# Patient Record
Sex: FEMALE | Race: BLACK OR AFRICAN AMERICAN | NOT HISPANIC OR LATINO | Employment: OTHER | URBAN - METROPOLITAN AREA
[De-identification: names, ages, dates, MRNs, and addresses within clinical notes are randomized per-mention and may not be internally consistent; named-entity substitution may affect disease eponyms.]

---

## 2022-03-14 ENCOUNTER — OFFICE VISIT (OUTPATIENT)
Dept: CARDIOLOGY | Facility: CLINIC | Age: 47
End: 2022-03-14
Payer: MEDICARE

## 2022-03-14 VITALS
SYSTOLIC BLOOD PRESSURE: 117 MMHG | RESPIRATION RATE: 20 BRPM | HEART RATE: 61 BPM | WEIGHT: 263.75 LBS | DIASTOLIC BLOOD PRESSURE: 75 MMHG | HEIGHT: 72 IN | BODY MASS INDEX: 35.72 KG/M2

## 2022-03-14 DIAGNOSIS — I87.2 VENOUS INSUFFICIENCY: ICD-10-CM

## 2022-03-14 DIAGNOSIS — I10 ESSENTIAL (PRIMARY) HYPERTENSION: ICD-10-CM

## 2022-03-14 DIAGNOSIS — I25.10 CORONARY ARTERY DISEASE, UNSPECIFIED VESSEL OR LESION TYPE, UNSPECIFIED WHETHER ANGINA PRESENT, UNSPECIFIED WHETHER NATIVE OR TRANSPLANTED HEART: Primary | ICD-10-CM

## 2022-03-14 DIAGNOSIS — Q87.418 MARFAN'S SYNDROME WITH OTHER CARDIOVASCULAR MANIFESTATIONS: ICD-10-CM

## 2022-03-14 PROCEDURE — 99205 PR OFFICE/OUTPT VISIT, NEW, LEVL V, 60-74 MIN: ICD-10-PCS | Mod: S$PBB,,, | Performed by: INTERNAL MEDICINE

## 2022-03-14 PROCEDURE — 99999 PR PBB SHADOW E&M-NEW PATIENT-LVL III: ICD-10-PCS | Mod: PBBFAC,,, | Performed by: INTERNAL MEDICINE

## 2022-03-14 PROCEDURE — 93005 ELECTROCARDIOGRAM TRACING: CPT | Mod: PBBFAC,PO | Performed by: INTERNAL MEDICINE

## 2022-03-14 PROCEDURE — 99999 PR PBB SHADOW E&M-NEW PATIENT-LVL III: CPT | Mod: PBBFAC,,, | Performed by: INTERNAL MEDICINE

## 2022-03-14 PROCEDURE — 93010 EKG 12-LEAD: ICD-10-PCS | Mod: S$PBB,,, | Performed by: INTERNAL MEDICINE

## 2022-03-14 PROCEDURE — 99203 OFFICE O/P NEW LOW 30 MIN: CPT | Mod: PBBFAC,PO | Performed by: INTERNAL MEDICINE

## 2022-03-14 PROCEDURE — 93010 ELECTROCARDIOGRAM REPORT: CPT | Mod: S$PBB,,, | Performed by: INTERNAL MEDICINE

## 2022-03-14 PROCEDURE — 99205 OFFICE O/P NEW HI 60 MIN: CPT | Mod: S$PBB,,, | Performed by: INTERNAL MEDICINE

## 2022-03-14 RX ORDER — BUMETANIDE 2 MG/1
2 TABLET ORAL
COMMUNITY
End: 2022-03-14 | Stop reason: SDUPTHER

## 2022-03-14 RX ORDER — POTASSIUM CHLORIDE 1.5 G/1.58G
40 POWDER, FOR SOLUTION ORAL
COMMUNITY
End: 2022-03-14 | Stop reason: SDUPTHER

## 2022-03-14 RX ORDER — POTASSIUM CHLORIDE 1.5 G/1.58G
40 POWDER, FOR SOLUTION ORAL DAILY
Qty: 90 PACKET | Refills: 3 | Status: SHIPPED | OUTPATIENT
Start: 2022-03-14 | End: 2022-09-09 | Stop reason: SDUPTHER

## 2022-03-14 RX ORDER — BUMETANIDE 1 MG/1
1 TABLET ORAL DAILY
Qty: 90 TABLET | Refills: 3 | Status: SHIPPED | OUTPATIENT
Start: 2022-03-14 | End: 2022-09-09 | Stop reason: SDUPTHER

## 2022-03-14 RX ORDER — CYANOCOBALAMIN 1000 UG/ML
1000 INJECTION, SOLUTION INTRAMUSCULAR; SUBCUTANEOUS
COMMUNITY
End: 2022-09-09 | Stop reason: SDUPTHER

## 2022-03-14 RX ORDER — DOXEPIN HYDROCHLORIDE 150 MG/1
150 CAPSULE ORAL
COMMUNITY
End: 2022-09-09 | Stop reason: ALTCHOICE

## 2022-03-14 RX ORDER — TIZANIDINE 4 MG/1
8 TABLET ORAL
COMMUNITY
End: 2022-09-09 | Stop reason: SDUPTHER

## 2022-03-14 RX ORDER — FERROUS SULFATE 325(65) MG
325 TABLET ORAL
COMMUNITY

## 2022-03-14 RX ORDER — NITROGLYCERIN 0.4 MG/1
0.4 TABLET SUBLINGUAL
COMMUNITY
End: 2022-03-14

## 2022-03-14 RX ORDER — GABAPENTIN 600 MG/1
600 TABLET ORAL
COMMUNITY
End: 2022-09-09 | Stop reason: SDUPTHER

## 2022-03-14 RX ORDER — SPIRONOLACTONE 50 MG/1
25 TABLET, FILM COATED ORAL
COMMUNITY
End: 2022-03-14 | Stop reason: SDUPTHER

## 2022-03-14 RX ORDER — METOLAZONE 10 MG/1
10 TABLET ORAL
COMMUNITY
End: 2022-03-14

## 2022-03-14 RX ORDER — CALCIUM CARBONATE 600 MG
600 TABLET ORAL
COMMUNITY

## 2022-03-14 RX ORDER — PROMETHAZINE HYDROCHLORIDE 25 MG/1
25 SUPPOSITORY RECTAL DAILY PRN
COMMUNITY
End: 2023-04-14

## 2022-03-14 RX ORDER — ISOSORBIDE DINITRATE 5 MG/1
5 TABLET ORAL
COMMUNITY
End: 2022-03-14

## 2022-03-14 RX ORDER — SPIRONOLACTONE 25 MG/1
25 TABLET ORAL DAILY
Qty: 90 TABLET | Refills: 3 | Status: SHIPPED | OUTPATIENT
Start: 2022-03-14 | End: 2023-03-09 | Stop reason: SDUPTHER

## 2022-03-14 NOTE — ASSESSMENT & PLAN NOTE
Patient carries diagnosis of Marfan's after SCAD event in '08. Patient unclear on details. No e/o Thoracic/abdominal aneurysm or dissection. Will request OSH cards notes to make sure there have been no other Marfan's related events. Sounds like she had a genetic evaluation in [place] that was unremarkable.

## 2022-03-14 NOTE — ASSESSMENT & PLAN NOTE
Blood pressures controlled on raymond 25x1/bumex 2x1. Sounds like they run low. Could not tolerate betablocker previously. Have reduced bumex.

## 2022-03-14 NOTE — ASSESSMENT & PLAN NOTE
Patient with a h/o SCAD '08 s/p CABGx1 w SVG-RCA. Will request OSH cardiologists notes. Multiple cath reports reviewed. RF Modification. Check lipid panel. Bps controlled.

## 2022-03-14 NOTE — PROGRESS NOTES
Chief Complaint   Patient presents with    Heart Problem       HPI: 48 yo F with a h/o Marfan's/SCAD '08 s/p CABGx1 to RCA, hypertension, L CIV/EIV stenting for May-Thurner '11 gastric bypass, RUL lung resection, left chest port '16 presenting for initial evaluation by me.     The patient recently moved from [place] to [place] and is establishing care here. The patient's initial cardiac event was in '08 when she presented w an AMI 2/2 SCAD. This resulted in a CABGx1. Pt was curiously asymptomatic at that time of event. Pt unclear on the details, but reports that she was getting a physical exam and due to a test abnormality was rushed to the emergency room, where she ended up undergoing cardiac catheterization and emergent bypass? This is where her diagnosis of Marfan's comes from. Negative genetic testing in [place].    Since then, no repeat operations. She's underwent multiple cardiac catheterizations and CT angiograms that have been unremarkable for a chronic intermittent chest pain syndrome that sometimes results in ED visits.     Recently, the patient reports being at her baseline. She has chronic discomfort that is intermittent. She'll have left mid-axillary and back cramping that will occur for 12 hours at a time and self-resolve. This occurs 2-3x/month. When it lasts longer or she can't get any relief she will present to the ED. Her last presentation was December 2020. She has chronic SOB as well. This will occur 2-3x/months lasting hours at a time and will limit activities. These symptoms have been present since 2008.    The patient walks for 45 minutes per day without limitation from above symptoms. She completes ADLs without issue.     Currently, she tolerates spironolactone 25x1 and Bumex 2x1. She has an asa allergy- hives.     The patient had a left chest port placed for venous access. Apparently she has difficult vein access and a decision was made to place a port. She has had 2 pneumothorazes  prior to port placement during central venous line placement.    PHYSICAL EXAM:  Vitals:    03/14/22 0910   BP: 117/75   Pulse: 61   Resp: 20       Physical Exam  Constitutional:       Appearance: Normal appearance.   Neck:      Vascular: No carotid bruit or JVD.   Cardiovascular:      Rate and Rhythm: Normal rate and regular rhythm.      Pulses: Normal pulses.           Radial pulses are 2+ on the right side and 2+ on the left side.        Dorsalis pedis pulses are 2+ on the right side and 2+ on the left side.        Posterior tibial pulses are 2+ on the right side and 2+ on the left side.      Heart sounds: S1 normal and S2 normal. No murmur heard.    No S3 sounds.   Pulmonary:      Effort: Pulmonary effort is normal.      Breath sounds: Normal breath sounds. No rales.   Neurological:      Gait: Gait is intact.       LABS/CARDIAC TESTS:  ECG today sinus bradycardia with no Q-waves or ST changes.    Outside hospital  January 2021 hemoglobin 12.9.  Creatinine 0.8 with a BUN of 16. Troponins negative.  2020 TSH normal.  Echocardiogram 2019 normal LV size and function with no wall motion abnormalities.  Normal RV size and function.  No significant valve disease.  The aortic root appears normal.  Cardiac catheterization April 2020 patent left coronary artery system with luminal irregularities.  No significant disease.  The RCA demonstrates a 70-80% proximal PDA lesion.  An SVG to the PDA is patent.  LVEDP 25. Normal LVEF with 1+ mitral regurgitation.  Similar results in 2018, 2016, and 2014.   CTA chest/abdomen/pelvis December 2020 no evidence of intramural hematoma or aortic dissection/aneurysm.  No evidence of thoracic or abdominal aortic aneurysm.  No significant calcification.  Normal cardiac size and borders.  A left common iliac and external vein stent is present.  Multiple CT angiograms of chest without evidence of aneurysm, dissection or pulmonary embolus done between 2013 and December 2020.   Venous duplex  2017 no evidence of DVT.    ASSESSMENT & PLAN:    Coronary artery disease  Patient with a h/o SCAD '08 s/p CABGx1 w SVG-RCA. Will request OSH cardiologists notes. Multiple cath reports reviewed. RF Modification. Check lipid panel. Bps controlled.     Essential (primary) hypertension   Blood pressures controlled on raymond 25x1/bumex 2x1. Sounds like they run low. Could not tolerate betablocker previously. Have reduced bumex.     Marfan's syndrome with other cardiovascular manifestations  Patient carries diagnosis of Marfan's after SCAD event in '08. Patient unclear on details. No e/o Thoracic/abdominal aneurysm or dissection. Will request OSH cards notes to make sure there have been no other Marfan's related events. Sounds like she had a genetic evaluation in [place] that was unremarkable.    Venous insufficiency  Chronic s/p L CIV/EIV stenting in '11. No h/o VTE. Le edema managed with diuretics and compression stockings.    Will reduce bumex to 1x1 and Kcl to 40x1. Labs pending.        Coronary artery disease, unspecified vessel or lesion type, unspecified whether angina present, unspecified whether native or transplanted heart  -     IN OFFICE EKG 12-LEAD (to Muse)  -     Echo; Future  -     CBC Without Differential; Future; Expected date: 03/14/2022  -     Comprehensive Metabolic Panel; Future; Expected date: 03/14/2022  -     TSH; Future; Expected date: 03/14/2022  -     Lipid Panel; Future; Expected date: 03/14/2022  -     X-Ray Chest PA And Lateral; Future; Expected date: 03/14/2022    Essential (primary) hypertension   -     TSH; Future; Expected date: 03/14/2022  -     spironolactone (ALDACTONE) 25 MG tablet; Take 1 tablet (25 mg total) by mouth once daily.  Dispense: 90 tablet; Refill: 3    Marfan's syndrome with other cardiovascular manifestations    Venous insufficiency  -     potassium chloride (KLOR-CON) 20 mEq Pack; Take 40 mEq by mouth once daily.  Dispense: 90 packet; Refill: 3  -     bumetanide  (BUMEX) 1 MG tablet; Take 1 tablet (1 mg total) by mouth once daily.  Dispense: 90 tablet; Refill: 3         Aleksandr Oviedo MD

## 2022-03-14 NOTE — ASSESSMENT & PLAN NOTE
Chronic s/p L CIV/EIV stenting in '11. No h/o VTE. Le edema managed with diuretics and compression stockings.    Will reduce bumex to 1x1 and Kcl to 40x1. Labs pending.

## 2022-03-15 ENCOUNTER — HOSPITAL ENCOUNTER (OUTPATIENT)
Dept: RADIOLOGY | Facility: HOSPITAL | Age: 47
Discharge: HOME | End: 2022-03-15
Attending: INTERNAL MEDICINE
Payer: MEDICARE

## 2022-03-15 DIAGNOSIS — I25.10 CORONARY ARTERY DISEASE, UNSPECIFIED VESSEL OR LESION TYPE, UNSPECIFIED WHETHER ANGINA PRESENT, UNSPECIFIED WHETHER NATIVE OR TRANSPLANTED HEART: ICD-10-CM

## 2022-03-15 PROCEDURE — 71046 XR CHEST PA AND LATERAL: ICD-10-PCS | Mod: 26,,, | Performed by: RADIOLOGY

## 2022-03-15 PROCEDURE — 71046 X-RAY EXAM CHEST 2 VIEWS: CPT | Mod: TC,FY,PO

## 2022-03-15 PROCEDURE — 71046 X-RAY EXAM CHEST 2 VIEWS: CPT | Mod: 26,,, | Performed by: RADIOLOGY

## 2022-03-18 ENCOUNTER — LAB VISIT (OUTPATIENT)
Dept: LAB | Facility: HOSPITAL | Age: 47
End: 2022-03-18
Attending: INTERNAL MEDICINE
Payer: MEDICARE

## 2022-03-18 DIAGNOSIS — I10 ESSENTIAL (PRIMARY) HYPERTENSION: ICD-10-CM

## 2022-03-18 DIAGNOSIS — I25.10 CORONARY ARTERY DISEASE, UNSPECIFIED VESSEL OR LESION TYPE, UNSPECIFIED WHETHER ANGINA PRESENT, UNSPECIFIED WHETHER NATIVE OR TRANSPLANTED HEART: ICD-10-CM

## 2022-03-18 LAB
ALBUMIN SERPL BCP-MCNC: 3.8 G/DL (ref 3.5–5.2)
ALP SERPL-CCNC: 115 U/L (ref 55–135)
ALT SERPL W/O P-5'-P-CCNC: 28 U/L (ref 10–44)
ANION GAP SERPL CALC-SCNC: 10 MMOL/L (ref 8–16)
AST SERPL-CCNC: 22 U/L (ref 10–40)
BILIRUB SERPL-MCNC: 0.6 MG/DL (ref 0.1–1)
BUN SERPL-MCNC: 13 MG/DL (ref 6–20)
CALCIUM SERPL-MCNC: 9.6 MG/DL (ref 8.7–10.5)
CHLORIDE SERPL-SCNC: 106 MMOL/L (ref 95–110)
CHOLEST SERPL-MCNC: 126 MG/DL (ref 120–199)
CHOLEST/HDLC SERPL: 2.5 {RATIO} (ref 2–5)
CO2 SERPL-SCNC: 24 MMOL/L (ref 23–29)
CREAT SERPL-MCNC: 0.7 MG/DL (ref 0.5–1.4)
ERYTHROCYTE [DISTWIDTH] IN BLOOD BY AUTOMATED COUNT: 12.8 % (ref 11.5–14.5)
EST. GFR  (AFRICAN AMERICAN): >60 ML/MIN/1.73 M^2
EST. GFR  (NON AFRICAN AMERICAN): >60 ML/MIN/1.73 M^2
GLUCOSE SERPL-MCNC: 84 MG/DL (ref 70–110)
HCT VFR BLD AUTO: 41.8 % (ref 37–48.5)
HDLC SERPL-MCNC: 50 MG/DL (ref 40–75)
HDLC SERPL: 39.7 % (ref 20–50)
HGB BLD-MCNC: 12.8 G/DL (ref 12–16)
LDLC SERPL CALC-MCNC: 59.4 MG/DL (ref 63–159)
MCH RBC QN AUTO: 26.6 PG (ref 27–31)
MCHC RBC AUTO-ENTMCNC: 30.6 G/DL (ref 32–36)
MCV RBC AUTO: 87 FL (ref 82–98)
NONHDLC SERPL-MCNC: 76 MG/DL
PLATELET # BLD AUTO: 197 K/UL (ref 150–450)
PMV BLD AUTO: 11.8 FL (ref 9.2–12.9)
POTASSIUM SERPL-SCNC: 4.3 MMOL/L (ref 3.5–5.1)
PROT SERPL-MCNC: 6.8 G/DL (ref 6–8.4)
RBC # BLD AUTO: 4.81 M/UL (ref 4–5.4)
SODIUM SERPL-SCNC: 140 MMOL/L (ref 136–145)
TRIGL SERPL-MCNC: 83 MG/DL (ref 30–150)
TSH SERPL DL<=0.005 MIU/L-ACNC: 1.68 UIU/ML (ref 0.4–4)
WBC # BLD AUTO: 4.83 K/UL (ref 3.9–12.7)

## 2022-03-18 PROCEDURE — 84443 ASSAY THYROID STIM HORMONE: CPT | Performed by: INTERNAL MEDICINE

## 2022-03-18 PROCEDURE — 36415 COLL VENOUS BLD VENIPUNCTURE: CPT | Performed by: INTERNAL MEDICINE

## 2022-03-18 PROCEDURE — 80053 COMPREHEN METABOLIC PANEL: CPT | Performed by: INTERNAL MEDICINE

## 2022-03-18 PROCEDURE — 85027 COMPLETE CBC AUTOMATED: CPT | Performed by: INTERNAL MEDICINE

## 2022-03-18 PROCEDURE — 80061 LIPID PANEL: CPT | Performed by: INTERNAL MEDICINE

## 2022-03-24 ENCOUNTER — HOSPITAL ENCOUNTER (OUTPATIENT)
Dept: CARDIOLOGY | Facility: CLINIC | Age: 47
Discharge: HOME | End: 2022-03-24
Payer: MEDICARE

## 2022-03-24 ENCOUNTER — HOSPITAL ENCOUNTER (OUTPATIENT)
Dept: CARDIOLOGY | Facility: HOSPITAL | Age: 47
Discharge: HOME | End: 2022-03-24
Attending: INTERNAL MEDICINE
Payer: MEDICARE

## 2022-03-24 VITALS
HEART RATE: 70 BPM | HEIGHT: 72 IN | BODY MASS INDEX: 35.62 KG/M2 | WEIGHT: 263 LBS | DIASTOLIC BLOOD PRESSURE: 75 MMHG | SYSTOLIC BLOOD PRESSURE: 120 MMHG

## 2022-03-24 DIAGNOSIS — I25.10 CORONARY ARTERY DISEASE, UNSPECIFIED VESSEL OR LESION TYPE, UNSPECIFIED WHETHER ANGINA PRESENT, UNSPECIFIED WHETHER NATIVE OR TRANSPLANTED HEART: ICD-10-CM

## 2022-03-24 PROCEDURE — 93306 TTE W/DOPPLER COMPLETE: CPT

## 2022-03-24 PROCEDURE — 93306 ECHO (CUPID ONLY): ICD-10-PCS | Mod: 26,,, | Performed by: INTERNAL MEDICINE

## 2022-03-24 PROCEDURE — 93306 TTE W/DOPPLER COMPLETE: CPT | Mod: 26,,, | Performed by: INTERNAL MEDICINE

## 2022-03-25 LAB
ASCENDING AORTA: 3.86 CM
AV INDEX (PROSTH): 0.87
AV MEAN GRADIENT: 5 MMHG
AV PEAK GRADIENT: 9 MMHG
AV VALVE AREA: 2.95 CM2
AV VELOCITY RATIO: 0.87
BSA FOR ECHO PROCEDURE: 2.53 M2
CV ECHO LV RWT: 0.31 CM
DOP CALC AO PEAK VEL: 1.51 M/S
DOP CALC AO VTI: 33.63 CM
DOP CALC LVOT AREA: 3.4 CM2
DOP CALC LVOT DIAMETER: 2.08 CM
DOP CALC LVOT PEAK VEL: 1.32 M/S
DOP CALC LVOT STROKE VOLUME: 99.17 CM3
DOP CALCLVOT PEAK VEL VTI: 29.2 CM
E WAVE DECELERATION TIME: 201.82 MSEC
E/A RATIO: 1.27
E/E' RATIO: 11.56 M/S
ECHO LV POSTERIOR WALL: 0.81 CM (ref 0.6–1.1)
EJECTION FRACTION: 65 %
FRACTIONAL SHORTENING: 37 % (ref 28–44)
INTERVENTRICULAR SEPTUM: 0.79 CM (ref 0.6–1.1)
IVRT: 51.38 MSEC
LA MAJOR: 5.35 CM
LA MINOR: 5.8 CM
LA WIDTH: 4.36 CM
LEFT ATRIUM SIZE: 4.24 CM
LEFT ATRIUM VOLUME INDEX MOD: 32.9 ML/M2
LEFT ATRIUM VOLUME INDEX: 35.1 ML/M2
LEFT ATRIUM VOLUME MOD: 81.99 CM3
LEFT ATRIUM VOLUME: 87.46 CM3
LEFT INTERNAL DIMENSION IN SYSTOLE: 3.25 CM (ref 2.1–4)
LEFT VENTRICLE DIASTOLIC VOLUME INDEX: 51.49 ML/M2
LEFT VENTRICLE DIASTOLIC VOLUME: 128.2 ML
LEFT VENTRICLE MASS INDEX: 58 G/M2
LEFT VENTRICLE SYSTOLIC VOLUME INDEX: 17.1 ML/M2
LEFT VENTRICLE SYSTOLIC VOLUME: 42.55 ML
LEFT VENTRICULAR INTERNAL DIMENSION IN DIASTOLE: 5.18 CM (ref 3.5–6)
LEFT VENTRICULAR MASS: 144.26 G
LV LATERAL E/E' RATIO: 8.67 M/S
LV SEPTAL E/E' RATIO: 17.33 M/S
MV A" WAVE DURATION": 20.27 MSEC
MV PEAK A VEL: 0.82 M/S
MV PEAK E VEL: 1.04 M/S
MV STENOSIS PRESSURE HALF TIME: 58.53 MS
MV VALVE AREA P 1/2 METHOD: 3.76 CM2
PISA TR MAX VEL: 2.86 M/S
PULM VEIN S/D RATIO: 0.66
PV PEAK D VEL: 0.85 M/S
PV PEAK S VEL: 0.56 M/S
RA MAJOR: 5.19 CM
RA PRESSURE: 3 MMHG
RA WIDTH: 4.22 CM
RIGHT VENTRICULAR END-DIASTOLIC DIMENSION: 3.97 CM
RV TISSUE DOPPLER FREE WALL SYSTOLIC VELOCITY 1 (APICAL 4 CHAMBER VIEW): 11.39 CM/S
SINUS: 3.66 CM
STJ: 3.35 CM
TDI LATERAL: 0.12 M/S
TDI SEPTAL: 0.06 M/S
TDI: 0.09 M/S
TR MAX PG: 33 MMHG
TRICUSPID ANNULAR PLANE SYSTOLIC EXCURSION: 1.66 CM
TV REST PULMONARY ARTERY PRESSURE: 36 MMHG

## 2022-05-17 ENCOUNTER — OFFICE VISIT (OUTPATIENT)
Dept: GASTROENTEROLOGY | Facility: CLINIC | Age: 47
End: 2022-05-17
Payer: MEDICARE

## 2022-05-17 ENCOUNTER — PATIENT MESSAGE (OUTPATIENT)
Dept: GASTROENTEROLOGY | Facility: CLINIC | Age: 47
End: 2022-05-17

## 2022-05-17 VITALS
WEIGHT: 268.06 LBS | HEART RATE: 62 BPM | SYSTOLIC BLOOD PRESSURE: 120 MMHG | BODY MASS INDEX: 36.31 KG/M2 | DIASTOLIC BLOOD PRESSURE: 60 MMHG | HEIGHT: 72 IN | OXYGEN SATURATION: 96 %

## 2022-05-17 DIAGNOSIS — R14.0 ABDOMINAL BLOATING: ICD-10-CM

## 2022-05-17 PROCEDURE — 99204 PR OFFICE/OUTPT VISIT, NEW, LEVL IV, 45-59 MIN: ICD-10-PCS | Mod: S$PBB,,, | Performed by: INTERNAL MEDICINE

## 2022-05-17 PROCEDURE — 99999 PR PBB SHADOW E&M-EST. PATIENT-LVL III: CPT | Mod: PBBFAC,,, | Performed by: INTERNAL MEDICINE

## 2022-05-17 PROCEDURE — 99204 OFFICE O/P NEW MOD 45 MIN: CPT | Mod: S$PBB,,, | Performed by: INTERNAL MEDICINE

## 2022-05-17 PROCEDURE — 99213 OFFICE O/P EST LOW 20 MIN: CPT | Mod: PBBFAC,PO | Performed by: INTERNAL MEDICINE

## 2022-05-17 PROCEDURE — 99999 PR PBB SHADOW E&M-EST. PATIENT-LVL III: ICD-10-PCS | Mod: PBBFAC,,, | Performed by: INTERNAL MEDICINE

## 2022-05-17 NOTE — PATIENT INSTRUCTIONS
Source: http://dysbiosis.[email or web address]/04/[email or web address]-diet.html

## 2022-05-17 NOTE — ASSESSMENT & PLAN NOTE
This is accompanied with belching and foul smelling flatus     -Low FODMAP diet   -Lactulose breath test ordered   -Gas x as needed

## 2022-05-17 NOTE — PROGRESS NOTES
"Clinic Consult:  [facility] Consultation Note    Reason for Consult:  The encounter diagnosis was Abdominal bloating.    PCP: Primary Doctor No   No address on file    HPI:  This is a 47 y.o. female here for evaluation of abdominal pain.     She had a gastric bypass in 2004.   She had complications.   In June 2019, she had adhesions removed secondary to "blockage."  When she eats or drinks, she experience bloating.   She has abdominal pain with the bloating.   She is passing gas.   She is also having foul odor gas.     Denies nausea,vomiting, hematemesis, hematochezia, melena.   She has regurgitation of food. This especially happens with rice.   Had a colonoscopy in 2016 and it was normal.   Denies family history of colon cancer.       ROS:  As per HPI      Medical History:   History reviewed. No pertinent past medical history.    Surgical History:  History reviewed. No pertinent surgical history.    Family History:   Family History   Problem Relation Age of Onset    Hypertension Mother     Heart attack Maternal Grandmother     Hyperlipidemia Maternal Grandfather        Social History:   Social History     Tobacco Use    Smoking status: Never Smoker    Smokeless tobacco: Never Used   Substance Use Topics    Alcohol use: Never    Drug use: Never       Allergies: Reviewed    Home Medications:   Medication List with Changes/Refills   Current Medications    BUMETANIDE (BUMEX) 1 MG TABLET    Take 1 tablet (1 mg total) by mouth once daily.    CALCIUM CARBONATE (OS-ESE) 600 MG CALCIUM (1,500 MG) TAB    Take 600 mg by mouth.    CYANOCOBALAMIN 1,000 MCG/ML INJECTION    Inject 1,000 mcg into the muscle.    DOXEPIN (SINEQUAN) 150 MG CAP    Take 150 mg by mouth.    ERGOCALCIFEROL, VITAMIN D2, (VITAMIN D ORAL)        FERROUS SULFATE (FEOSOL) 325 MG (65 MG IRON) TAB TABLET    Take 325 mg by mouth.    GABAPENTIN (NEURONTIN) 600 MG TABLET    Take 600 mg by mouth.    IRON,CARB/VIT C/VIT B12/FOLIC (IRON 100 PLUS " "ORAL)        POTASSIUM CHLORIDE (KLOR-CON) 20 MEQ PACK    Take 40 mEq by mouth once daily.    PROMETHAZINE (PHENERGAN) 25 MG SUPPOSITORY    Place 25 mg rectally daily as needed.    SPIRONOLACTONE (ALDACTONE) 25 MG TABLET    Take 1 tablet (25 mg total) by mouth once daily.    TIZANIDINE (ZANAFLEX) 4 MG TABLET    Take 8 mg by mouth.         Physical Exam:  Vital Signs:  /60   Pulse 62   Ht 6' 4" (1.93 m)   Wt 121.6 kg (268 lb 1.3 oz)   LMP 11/10/2021   SpO2 96%   BMI 32.63 kg/m²   Body mass index is 32.63 kg/m².    Physical Exam  Constitutional:       Appearance: Normal appearance.   Eyes:      Conjunctiva/sclera: Conjunctivae normal.   Pulmonary:      Effort: Pulmonary effort is normal.   Abdominal:      General: There is distension.      Palpations: Abdomen is soft.      Tenderness: There is no abdominal tenderness. There is no guarding.   Neurological:      Mental Status: She is alert.          Labs: Pertinent labs reviewed.        Assessment:  Problem List Items Addressed This Visit        GI    Abdominal bloating    Current Assessment & Plan     This is accompanied with belching and foul smelling flatus     -Low FODMAP diet   -Lactulose breath test ordered   -Gas x as needed                Abdominal bloating         Follow-up after diagnostic evaluation.       Order summary:  No orders of the defined types were placed in this encounter.      Thank you so much for allowing me to participate in the care of Viri Pelon Chavez MD  Gastroenterology and Hepatology  [facility]    "

## 2022-05-23 ENCOUNTER — TELEPHONE (OUTPATIENT)
Dept: GASTROENTEROLOGY | Facility: CLINIC | Age: 47
End: 2022-05-23
Payer: MEDICARE

## 2022-05-24 RX ORDER — NEOMYCIN SULFATE 500 MG/1
500 TABLET ORAL 2 TIMES DAILY
Qty: 28 TABLET | Refills: 0 | Status: SHIPPED | OUTPATIENT
Start: 2022-05-24 | End: 2022-06-07

## 2022-05-24 NOTE — PROGRESS NOTES
[facility]     Reviewed lactulose breath test. Patient has an increase in both hydrogen and methane.    Rifaximin and Neomycin has been prescribed for patient.

## 2022-06-02 ENCOUNTER — PATIENT MESSAGE (OUTPATIENT)
Dept: GASTROENTEROLOGY | Facility: CLINIC | Age: 47
End: 2022-06-02
Payer: MEDICARE

## 2022-06-21 ENCOUNTER — OFFICE VISIT (OUTPATIENT)
Dept: GASTROENTEROLOGY | Facility: CLINIC | Age: 47
End: 2022-06-21
Payer: MEDICARE

## 2022-06-21 ENCOUNTER — TELEPHONE (OUTPATIENT)
Dept: GASTROENTEROLOGY | Facility: CLINIC | Age: 47
End: 2022-06-21
Payer: MEDICARE

## 2022-06-21 VITALS
SYSTOLIC BLOOD PRESSURE: 118 MMHG | DIASTOLIC BLOOD PRESSURE: 84 MMHG | WEIGHT: 266.31 LBS | HEART RATE: 70 BPM | HEIGHT: 72 IN | BODY MASS INDEX: 36.07 KG/M2 | OXYGEN SATURATION: 98 %

## 2022-06-21 DIAGNOSIS — R14.0 ABDOMINAL BLOATING: ICD-10-CM

## 2022-06-21 PROCEDURE — 99999 PR PBB SHADOW E&M-EST. PATIENT-LVL III: CPT | Mod: PBBFAC,,, | Performed by: INTERNAL MEDICINE

## 2022-06-21 PROCEDURE — 99214 PR OFFICE/OUTPT VISIT, EST, LEVL IV, 30-39 MIN: ICD-10-PCS | Mod: S$PBB,,, | Performed by: INTERNAL MEDICINE

## 2022-06-21 PROCEDURE — 99214 OFFICE O/P EST MOD 30 MIN: CPT | Mod: S$PBB,,, | Performed by: INTERNAL MEDICINE

## 2022-06-21 PROCEDURE — 99213 OFFICE O/P EST LOW 20 MIN: CPT | Mod: PBBFAC,PO | Performed by: INTERNAL MEDICINE

## 2022-06-21 PROCEDURE — 99999 PR PBB SHADOW E&M-EST. PATIENT-LVL III: ICD-10-PCS | Mod: PBBFAC,,, | Performed by: INTERNAL MEDICINE

## 2022-06-21 RX ORDER — METRONIDAZOLE 500 MG/1
250 TABLET ORAL EVERY 8 HOURS
Qty: 21 TABLET | Refills: 0 | Status: SHIPPED | OUTPATIENT
Start: 2022-06-21 | End: 2022-07-05

## 2022-06-21 NOTE — TELEPHONE ENCOUNTER
----- Message from Neelima Zhu sent at 6/21/2022 11:20 AM CDT -----  Contact: patient/[phone]  Patient is on her way she locked her keys up in her car and  had to bring her a set and he was at a distance she is on her way please call if she will not be seen.    Thanks KL

## 2022-06-21 NOTE — PROGRESS NOTES
"Clinic Progress Note:  [facility] Progress Note    Reason for Visit:  The encounter diagnosis was Abdominal bloating.    PCP: Primary Doctor No       HPI:  This is a 47 y.o. female here for evaluation of SIBO.   She was diagnosed with SIBO after last visit.   Prescribed Neomycin and Rifaximin.   She completed Neomycin.   She was unable to fill Rifaximin due to price.  She is asking for alternative.   She is following low FODMAP diet.       ROS:  As per HPI       Allergies: Reviewed    Home Medications:   Medication List with Changes/Refills   New Medications    METRONIDAZOLE (FLAGYL) 500 MG TABLET    Take 0.5 tablets (250 mg total) by mouth every 8 (eight) hours. for 14 days   Current Medications    BUMETANIDE (BUMEX) 1 MG TABLET    Take 1 tablet (1 mg total) by mouth once daily.    CALCIUM CARBONATE (OS-ESE) 600 MG CALCIUM (1,500 MG) TAB    Take 600 mg by mouth.    CYANOCOBALAMIN 1,000 MCG/ML INJECTION    Inject 1,000 mcg into the muscle.    DOXEPIN (SINEQUAN) 150 MG CAP    Take 150 mg by mouth.    ERGOCALCIFEROL, VITAMIN D2, (VITAMIN D ORAL)        FERROUS SULFATE (FEOSOL) 325 MG (65 MG IRON) TAB TABLET    Take 325 mg by mouth.    GABAPENTIN (NEURONTIN) 600 MG TABLET    Take 600 mg by mouth.    IRON,CARB/VIT C/VIT B12/FOLIC (IRON 100 PLUS ORAL)        POTASSIUM CHLORIDE (KLOR-CON) 20 MEQ PACK    Take 40 mEq by mouth once daily.    PROMETHAZINE (PHENERGAN) 25 MG SUPPOSITORY    Place 25 mg rectally daily as needed.    SPIRONOLACTONE (ALDACTONE) 25 MG TABLET    Take 1 tablet (25 mg total) by mouth once daily.    TIZANIDINE (ZANAFLEX) 4 MG TABLET    Take 8 mg by mouth.         Physical Exam:  Vital Signs:  /84   Pulse 70   Ht 6' 4" (1.93 m)   Wt 120.8 kg (266 lb 5.1 oz)   LMP 11/10/2021   SpO2 98%   BMI 32.42 kg/m²   Body mass index is 32.42 kg/m².    Physical Exam  Constitutional:       Appearance: Normal appearance.   Eyes:      Conjunctiva/sclera: Conjunctivae normal.   Abdominal:      " General: There is distension.      Palpations: Abdomen is soft.      Tenderness: There is no abdominal tenderness. There is no guarding.   Neurological:      Mental Status: She is alert.          Labs: Pertinent labs reviewed.        Assessment:  Problem List Items Addressed This Visit        GI    Abdominal bloating    Current Assessment & Plan     -Diagnosed with SIBO at last visit     Plan:   -Will prescribed Flagyl as an alternative to Rifaximin   -Continue low FODMAP diet   -Gas x as needed                Abdominal bloating    Other orders  -     metroNIDAZOLE (FLAGYL) 500 MG tablet; Take 0.5 tablets (250 mg total) by mouth every 8 (eight) hours. for 14 days  Dispense: 21 tablet; Refill: 0                Follow up in about 8 weeks (around 8/16/2022).    Order summary:  No orders of the defined types were placed in this encounter.      Thank you so much for allowing me to participate in the care of Viri Chavez MD  Gastroenterology and Hepatology  [facility]

## 2022-06-21 NOTE — TELEPHONE ENCOUNTER
Returned call to pt. Rescheduled appt to 2pm. Pt will go have lunch and go back to check in. Dr Chavez notified.

## 2022-06-22 NOTE — ASSESSMENT & PLAN NOTE
-Diagnosed with SIBO at last visit     Plan:   -Will prescribed Flagyl as an alternative to Rifaximin   -Continue low FODMAP diet   -Gas x as needed

## 2022-07-08 ENCOUNTER — TELEPHONE (OUTPATIENT)
Dept: CARDIOLOGY | Facility: CLINIC | Age: 47
End: 2022-07-08
Payer: MEDICARE

## 2022-07-08 NOTE — TELEPHONE ENCOUNTER
[facility]([phone], [phone] fax) sent a fax asking for pt to have clearance for Extraction/Surgery, Cleaning/Periodontal Treatment. Pt has h/o Marfan's Syndrome. LOV w/you 3/14/2022. Can you clear pt by chart? Please advise.     Addendum:    No CV contraindication to proceeding with aforementioned dental treatments.

## 2022-08-16 ENCOUNTER — OFFICE VISIT (OUTPATIENT)
Dept: GASTROENTEROLOGY | Facility: CLINIC | Age: 47
End: 2022-08-16
Payer: MEDICARE

## 2022-08-16 VITALS
HEIGHT: 72 IN | BODY MASS INDEX: 35.35 KG/M2 | DIASTOLIC BLOOD PRESSURE: 76 MMHG | SYSTOLIC BLOOD PRESSURE: 130 MMHG | WEIGHT: 261 LBS

## 2022-08-16 DIAGNOSIS — K63.8219 SMALL INTESTINAL BACTERIAL OVERGROWTH (SIBO): Primary | ICD-10-CM

## 2022-08-16 PROCEDURE — 99999 PR PBB SHADOW E&M-EST. PATIENT-LVL III: CPT | Mod: PBBFAC,,, | Performed by: INTERNAL MEDICINE

## 2022-08-16 PROCEDURE — 99999 PR PBB SHADOW E&M-EST. PATIENT-LVL III: ICD-10-PCS | Mod: PBBFAC,,, | Performed by: INTERNAL MEDICINE

## 2022-08-16 PROCEDURE — 99213 OFFICE O/P EST LOW 20 MIN: CPT | Mod: PBBFAC,PO | Performed by: INTERNAL MEDICINE

## 2022-08-16 PROCEDURE — 99214 OFFICE O/P EST MOD 30 MIN: CPT | Mod: S$PBB,,, | Performed by: INTERNAL MEDICINE

## 2022-08-16 PROCEDURE — 99214 PR OFFICE/OUTPT VISIT, EST, LEVL IV, 30-39 MIN: ICD-10-PCS | Mod: S$PBB,,, | Performed by: INTERNAL MEDICINE

## 2022-08-16 RX ORDER — NEOMYCIN SULFATE 500 MG/1
500 TABLET ORAL 2 TIMES DAILY
Qty: 28 TABLET | Refills: 0 | Status: SHIPPED | OUTPATIENT
Start: 2022-08-16 | End: 2022-08-30

## 2022-08-16 RX ORDER — FLUCONAZOLE 150 MG/1
150 TABLET ORAL DAILY
Qty: 1 TABLET | Refills: 0 | Status: SHIPPED | OUTPATIENT
Start: 2022-08-16 | End: 2022-08-17

## 2022-08-16 RX ORDER — AMOXICILLIN AND CLAVULANATE POTASSIUM 875; 125 MG/1; MG/1
1 TABLET, FILM COATED ORAL EVERY 12 HOURS
Qty: 28 TABLET | Refills: 0 | Status: SHIPPED | OUTPATIENT
Start: 2022-08-16 | End: 2022-08-30

## 2022-08-16 NOTE — PROGRESS NOTES
Clinic Progress Note:  [facility] Progress Note    Reason for Visit:  There were no encounter diagnoses.    PCP: Primary Doctor No       HPI:    This is a 47 y.o. female here for evaluation of SIBO.   She was diagnosed with SIBO after last visit.   Prescribed Neomycin and Rifaximin.   She completed Neomycin and Flagyl.   She was unable to fill Rifaximin due to price.  She is following low FODMAP diet.   She is still experiencing abdominal bloating.         ROS:  As per HPI        Allergies: Reviewed    Home Medications:   Medication List with Changes/Refills   New Medications    AMOXICILLIN-CLAVULANATE 875-125MG (AUGMENTIN) 875-125 MG PER TABLET    Take 1 tablet by mouth every 12 (twelve) hours. for 14 days    FLUCONAZOLE (DIFLUCAN) 150 MG TAB    Take 1 tablet (150 mg total) by mouth once daily. for 1 day    NEOMYCIN (MYCIFRADIN) 500 MG TAB    Take 1 tablet (500 mg total) by mouth 2 (two) times daily. for 14 days   Current Medications    BUMETANIDE (BUMEX) 1 MG TABLET    Take 1 tablet (1 mg total) by mouth once daily.    CALCIUM CARBONATE (OS-ESE) 600 MG CALCIUM (1,500 MG) TAB    Take 600 mg by mouth.    CYANOCOBALAMIN 1,000 MCG/ML INJECTION    Inject 1,000 mcg into the muscle.    DOXEPIN (SINEQUAN) 150 MG CAP    Take 150 mg by mouth.    ERGOCALCIFEROL, VITAMIN D2, (VITAMIN D ORAL)        FERROUS SULFATE (FEOSOL) 325 MG (65 MG IRON) TAB TABLET    Take 325 mg by mouth.    GABAPENTIN (NEURONTIN) 600 MG TABLET    Take 600 mg by mouth.    IRON,CARB/VIT C/VIT B12/FOLIC (IRON 100 PLUS ORAL)        POTASSIUM CHLORIDE (KLOR-CON) 20 MEQ PACK    Take 40 mEq by mouth once daily.    PROMETHAZINE (PHENERGAN) 25 MG SUPPOSITORY    Place 25 mg rectally daily as needed.    SPIRONOLACTONE (ALDACTONE) 25 MG TABLET    Take 1 tablet (25 mg total) by mouth once daily.    TIZANIDINE (ZANAFLEX) 4 MG TABLET    Take 8 mg by mouth.         Physical Exam:  Vital Signs:  /76 (BP Location: Right arm, Patient Position:  "Sitting, BP Method: Large (Manual))   Ht 6' 4.5" (1.943 m)   Wt 118.4 kg (261 lb 0.4 oz)   BMI 31.36 kg/m²   Body mass index is 31.36 kg/m².    Physical Exam  Constitutional:       Appearance: Normal appearance.   HENT:      Head: Normocephalic.   Eyes:      Conjunctiva/sclera: Conjunctivae normal.   Pulmonary:      Effort: Pulmonary effort is normal.   Abdominal:      General: There is no distension.      Palpations: Abdomen is soft.      Tenderness: There is no abdominal tenderness. There is no guarding.   Neurological:      Mental Status: She is alert.          Labs: Pertinent labs reviewed.      Assessment:    Problem List Items Addressed This Visit                 GI      Abdominal bloating      Current Assessment & Plan        -Diagnosed with SIBO      Plan:   -s/p Neomycin and Flagyl but she continues to have symptoms   -Cannot afford Rifaximin   -Will start on Neomycin and Augmentin    -Continue low FODMAP diet   -Gas x as needed                   No follow-ups on file.    Order summary:  No orders of the defined types were placed in this encounter.      Thank you so much for allowing me to participate in the care of Viri Chavez MD  Gastroenterology and Hepatology  [facility]     "

## 2022-08-22 ENCOUNTER — HOSPITAL ENCOUNTER (EMERGENCY)
Facility: HOSPITAL | Age: 47
Discharge: HOME | End: 2022-08-22
Attending: EMERGENCY MEDICINE
Payer: MEDICARE

## 2022-08-22 VITALS
HEART RATE: 59 BPM | RESPIRATION RATE: 12 BRPM | DIASTOLIC BLOOD PRESSURE: 76 MMHG | SYSTOLIC BLOOD PRESSURE: 149 MMHG | WEIGHT: 258.63 LBS | BODY MASS INDEX: 35.03 KG/M2 | HEIGHT: 72 IN | OXYGEN SATURATION: 100 % | TEMPERATURE: 100 F

## 2022-08-22 DIAGNOSIS — R07.9 CHEST PAIN: Primary | ICD-10-CM

## 2022-08-22 LAB
ALBUMIN SERPL BCP-MCNC: 4.1 G/DL (ref 3.5–5.2)
ALP SERPL-CCNC: 131 U/L (ref 55–135)
ALT SERPL W/O P-5'-P-CCNC: 25 U/L (ref 10–44)
ANION GAP SERPL CALC-SCNC: 11 MMOL/L (ref 8–16)
AST SERPL-CCNC: 25 U/L (ref 10–40)
BASOPHILS # BLD AUTO: 0.02 K/UL (ref 0–0.2)
BASOPHILS NFR BLD: 0.4 % (ref 0–1.9)
BILIRUB SERPL-MCNC: 0.5 MG/DL (ref 0.1–1)
BILIRUB UR QL STRIP: NEGATIVE
BUN SERPL-MCNC: 8 MG/DL (ref 6–20)
CALCIUM SERPL-MCNC: 9.4 MG/DL (ref 8.7–10.5)
CHLORIDE SERPL-SCNC: 105 MMOL/L (ref 95–110)
CK SERPL-CCNC: 71 U/L (ref 20–180)
CLARITY UR: CLEAR
CO2 SERPL-SCNC: 24 MMOL/L (ref 23–29)
COLOR UR: COLORLESS
CREAT SERPL-MCNC: 0.7 MG/DL (ref 0.5–1.4)
DIFFERENTIAL METHOD: ABNORMAL
EOSINOPHIL # BLD AUTO: 0 K/UL (ref 0–0.5)
EOSINOPHIL NFR BLD: 0.4 % (ref 0–8)
ERYTHROCYTE [DISTWIDTH] IN BLOOD BY AUTOMATED COUNT: 12.1 % (ref 11.5–14.5)
EST. GFR  (NO RACE VARIABLE): >60 ML/MIN/1.73 M^2
GLUCOSE SERPL-MCNC: 93 MG/DL (ref 70–110)
GLUCOSE UR QL STRIP: NEGATIVE
HCT VFR BLD AUTO: 39.4 % (ref 37–48.5)
HGB BLD-MCNC: 12.3 G/DL (ref 12–16)
HGB UR QL STRIP: NEGATIVE
IMM GRANULOCYTES # BLD AUTO: 0.01 K/UL (ref 0–0.04)
IMM GRANULOCYTES NFR BLD AUTO: 0.2 % (ref 0–0.5)
KETONES UR QL STRIP: NEGATIVE
LEUKOCYTE ESTERASE UR QL STRIP: NEGATIVE
LYMPHOCYTES # BLD AUTO: 2.4 K/UL (ref 1–4.8)
LYMPHOCYTES NFR BLD: 45.1 % (ref 18–48)
MCH RBC QN AUTO: 25.6 PG (ref 27–31)
MCHC RBC AUTO-ENTMCNC: 31.2 G/DL (ref 32–36)
MCV RBC AUTO: 82 FL (ref 82–98)
MONOCYTES # BLD AUTO: 0.4 K/UL (ref 0.3–1)
MONOCYTES NFR BLD: 8.2 % (ref 4–15)
NEUTROPHILS # BLD AUTO: 2.5 K/UL (ref 1.8–7.7)
NEUTROPHILS NFR BLD: 45.7 % (ref 38–73)
NITRITE UR QL STRIP: NEGATIVE
NRBC BLD-RTO: 0 /100 WBC
PH UR STRIP: 7 [PH] (ref 5–8)
PLATELET # BLD AUTO: 255 K/UL (ref 150–450)
PMV BLD AUTO: 10.5 FL (ref 9.2–12.9)
POTASSIUM SERPL-SCNC: 4.2 MMOL/L (ref 3.5–5.1)
PROT SERPL-MCNC: 7.3 G/DL (ref 6–8.4)
PROT UR QL STRIP: NEGATIVE
RBC # BLD AUTO: 4.81 M/UL (ref 4–5.4)
SODIUM SERPL-SCNC: 140 MMOL/L (ref 136–145)
SP GR UR STRIP: <1.005 (ref 1–1.03)
TROPONIN I SERPL DL<=0.01 NG/ML-MCNC: <0.006 NG/ML (ref 0–0.03)
URN SPEC COLLECT METH UR: ABNORMAL
UROBILINOGEN UR STRIP-ACNC: NEGATIVE EU/DL
WBC # BLD AUTO: 5.36 K/UL (ref 3.9–12.7)

## 2022-08-22 PROCEDURE — 93005 ELECTROCARDIOGRAM TRACING: CPT

## 2022-08-22 PROCEDURE — 82550 ASSAY OF CK (CPK): CPT | Performed by: EMERGENCY MEDICINE

## 2022-08-22 PROCEDURE — 99284 EMERGENCY DEPT VISIT MOD MDM: CPT | Mod: 25

## 2022-08-22 PROCEDURE — 93010 ELECTROCARDIOGRAM REPORT: CPT | Mod: ,,, | Performed by: INTERNAL MEDICINE

## 2022-08-22 PROCEDURE — 96375 TX/PRO/DX INJ NEW DRUG ADDON: CPT

## 2022-08-22 PROCEDURE — 85025 COMPLETE CBC W/AUTO DIFF WBC: CPT | Performed by: EMERGENCY MEDICINE

## 2022-08-22 PROCEDURE — 96374 THER/PROPH/DIAG INJ IV PUSH: CPT

## 2022-08-22 PROCEDURE — 81003 URINALYSIS AUTO W/O SCOPE: CPT | Performed by: EMERGENCY MEDICINE

## 2022-08-22 PROCEDURE — 84484 ASSAY OF TROPONIN QUANT: CPT | Performed by: EMERGENCY MEDICINE

## 2022-08-22 PROCEDURE — 25000003 PHARM REV CODE 250: Performed by: EMERGENCY MEDICINE

## 2022-08-22 PROCEDURE — 80053 COMPREHEN METABOLIC PANEL: CPT | Performed by: EMERGENCY MEDICINE

## 2022-08-22 PROCEDURE — 93010 EKG 12-LEAD: ICD-10-PCS | Mod: ,,, | Performed by: INTERNAL MEDICINE

## 2022-08-22 PROCEDURE — 63600175 PHARM REV CODE 636 W HCPCS: Performed by: EMERGENCY MEDICINE

## 2022-08-22 RX ORDER — MORPHINE SULFATE 4 MG/ML
4 INJECTION, SOLUTION INTRAMUSCULAR; INTRAVENOUS
Status: COMPLETED | OUTPATIENT
Start: 2022-08-22 | End: 2022-08-22

## 2022-08-22 RX ORDER — MAG HYDROX/ALUMINUM HYD/SIMETH 200-200-20
30 SUSPENSION, ORAL (FINAL DOSE FORM) ORAL ONCE
Status: COMPLETED | OUTPATIENT
Start: 2022-08-22 | End: 2022-08-22

## 2022-08-22 RX ORDER — HYDROCODONE BITARTRATE AND ACETAMINOPHEN 5; 325 MG/1; MG/1
1 TABLET ORAL EVERY 4 HOURS PRN
Qty: 11 TABLET | Refills: 0 | Status: SHIPPED | OUTPATIENT
Start: 2022-08-22 | End: 2022-08-27

## 2022-08-22 RX ORDER — LIDOCAINE HYDROCHLORIDE 20 MG/ML
15 SOLUTION OROPHARYNGEAL ONCE
Status: COMPLETED | OUTPATIENT
Start: 2022-08-22 | End: 2022-08-22

## 2022-08-22 RX ORDER — HEPARIN 100 UNIT/ML
5 SYRINGE INTRAVENOUS
Status: COMPLETED | OUTPATIENT
Start: 2022-08-22 | End: 2022-08-22

## 2022-08-22 RX ORDER — DIPHENHYDRAMINE HYDROCHLORIDE 50 MG/ML
25 INJECTION INTRAMUSCULAR; INTRAVENOUS
Status: COMPLETED | OUTPATIENT
Start: 2022-08-22 | End: 2022-08-22

## 2022-08-22 RX ORDER — CYCLOBENZAPRINE HCL 10 MG
10 TABLET ORAL 3 TIMES DAILY PRN
Qty: 15 TABLET | Refills: 0 | Status: SHIPPED | OUTPATIENT
Start: 2022-08-22 | End: 2022-08-27

## 2022-08-22 RX ADMIN — ALUMINUM HYDROXIDE, MAGNESIUM HYDROXIDE, AND SIMETHICONE 30 ML: 200; 200; 20 SUSPENSION ORAL at 10:08

## 2022-08-22 RX ADMIN — LIDOCAINE HYDROCHLORIDE 15 ML: 20 SOLUTION ORAL; TOPICAL at 10:08

## 2022-08-22 RX ADMIN — DIPHENHYDRAMINE HYDROCHLORIDE 25 MG: 50 INJECTION, SOLUTION INTRAMUSCULAR; INTRAVENOUS at 11:08

## 2022-08-22 RX ADMIN — MORPHINE SULFATE 4 MG: 4 INJECTION INTRAVENOUS at 11:08

## 2022-08-22 RX ADMIN — HEPARIN 500 UNITS: 100 SYRINGE at 12:08

## 2022-08-22 NOTE — ED PROVIDER NOTES
SCRIBE #1 NOTE: I, Ronit Pulido, am scribing for, and in the presence of, Nikhil Block MD. I have scribed the entire note.      History      Chief Complaint   Patient presents with    Chest Pain     Pt complaining of chest pain since yesterday; took nitro earlier this morning; radiates to arm and neck       Review of patient's allergies indicates:   Allergen Reactions    Aspirin Anaphylaxis     Tongue swelling      Cephalexin Anaphylaxis and Nausea And Vomiting    Iodine and iodide containing products Other (See Comments)     Pt is not allergic to contrast dye IV     Levofloxacin Anaphylaxis    Sulfa (sulfonamide antibiotics) Anaphylaxis, Blisters, Dermatitis, Itching and Shortness Of Breath    Sulfamethoxazole-trimethoprim Anaphylaxis     3rd degree skin burning when given IV      Sulfasalazine Anaphylaxis    Tramadol Nausea And Vomiting    Adhesive Dermatitis, Hives and Itching     bandaids and adhesive on electrodes  bandaids and adhesive on electrodes, whelps      Adhesive tape-silicones Itching    Barium iodide Nausea And Vomiting     Oral only causes vomiting        HPI   HPI    8/22/2022, 9:55 AM   History obtained from the patient      History of Present Illness: Viri Bird is a 47 y.o. female patient with a PMHx of CAD s/p CABG, heart murmur, HTN, and Marfan syndrome who presents to the Emergency Department for mid-sternal CP that radiates down the BUE which onset gradually 3 days PTA. The pt reports that her pain will run down either of her arms intermittently. Today, the pt's CP worsened to where she had to take NTG, so she came to the ED for an evaluation because she was advised to do so by her Cardiologist when she takes NTG. Symptoms are constant and moderate in severity. No mitigating or exacerbating factors reported. Associated sxs include BUE pain radiating from the chest. Patient denies any fever, chills, SOB, N/V/D, diaphoresis, weakness, numbness, and all other sxs at  this time. No further complaints or concerns at this time.         Arrival mode: Personal vehicle      PCP: Primary Doctor No       Past Medical History:  Past Medical History:   Diagnosis Date    Anemia 1990    Anxiety 2010    Depression     Encounter for blood transfusion 01/15/2008    Endometriosis of uterus 1999    Fibroid 2010    Gestational diabetes     Heart disease     Heart murmur 2008    Hypertension     Marfan syndrome        Past Surgical History:  Past Surgical History:   Procedure Laterality Date    ABDOMINAL SURGERY  2006    several-had abscesses and complications after wt loss surgery; repair of small bowl injury; subsequent colon resection 19    APPENDECTOMY  2009    Rupture    BREAST BIOPSY Left 2019    CERVIX LESION DESTRUCTION      cryo for cervical dysplasia    CHOLECYSTECTOMY      COLONOSCOPY  2018    Normal    CORONARY ARTERY BYPASS GRAFT (CABG)      DILATION AND CURETTAGE OF UTERUS      Miscarriage    LUNG REMOVAL, PARTIAL Right     2/3 of right lung removed; thought to be lung cancer, final path equals histoplasmosis    CHANDA-EN-Y PROCEDURE           Family History:  Family History   Problem Relation Age of Onset    Hypertension Mother     Rheum arthritis Mother     Cancer Mother     Diabetes Mother         Type 2 w/insulin    Lung cancer Mother         Lung cancer complications at 63    Heart attack Maternal Grandmother     Diabetes Maternal Grandmother         Brother Type 2    Hyperlipidemia Maternal Grandfather     Heart failure Maternal Grandfather     Heart failure Paternal Grandmother          from Heart Attack    Rheum arthritis Maternal Uncle         Blood sepsis    Cancer Maternal Aunt         Ovarian cancer    Cancer Maternal Aunt         Brain cancer    Migraines Brother        Social History:  Social History     Tobacco Use    Smoking status: Never Smoker    Smokeless tobacco: Never Used    Substance and Sexual Activity    Alcohol use: Not Currently     Comment: Once every other month    Drug use: Never    Sexual activity: Yes     Partners: Male     Birth control/protection: None     Comment:         ROS   Review of Systems   Constitutional: Negative for chills, diaphoresis and fever.   HENT: Negative for sore throat.    Respiratory: Negative for shortness of breath.    Cardiovascular: Positive for chest pain (mid-sternal and radiates down the BUE).   Gastrointestinal: Negative for diarrhea, nausea and vomiting.   Genitourinary: Negative for dysuria.   Musculoskeletal: Positive for myalgias (BUE pain radiating from the chest). Negative for back pain.   Skin: Negative for rash.   Neurological: Negative for weakness and numbness.   Hematological: Does not bruise/bleed easily.   All other systems reviewed and are negative.    Physical Exam      Initial Vitals [08/22/22 0942]   BP Pulse Resp Temp SpO2   (!) 182/64 87 20 99.7 °F (37.6 °C) 98 %      MAP       --          Physical Exam  Nursing Notes and Vital Signs Reviewed.  Constitutional: Patient is in no acute distress. Well-developed and well-nourished.  Head: Atraumatic. Normocephalic.  Eyes: PERRL. EOM intact. Conjunctivae are not pale. No scleral icterus.  ENT: Mucous membranes are moist. Oropharynx is clear and symmetric.    Neck: Supple. Full ROM. No lymphadenopathy.  Cardiovascular: Regular rate. Regular rhythm. No murmurs, rubs, or gallops. Distal pulses are 2+ and symmetric.  Pulmonary/Chest: No respiratory distress. Clear to auscultation bilaterally. No wheezing or rales.  Abdominal: Soft and non-distended.  There is no tenderness.  No rebound, guarding, or rigidity.   Musculoskeletal: Moves all extremities. No obvious deformities. No edema.  Skin: Warm and dry.  Neurological:  Alert, awake, and appropriate.  Normal speech.  No acute focal neurological deficits are appreciated.  Psychiatric: Normal affect. Good eye contact.  "Appropriate in content.    ED Course    Procedures  ED Vital Signs:  Vitals:    08/22/22 0942 08/22/22 1100 08/22/22 1122 08/22/22 1200   BP: (!) 182/64 (!) 145/74  (!) 149/76   Pulse: 87 (!) 58  (!) 54   Resp: 20 (!) 21 18 20   Temp: 99.7 °F (37.6 °C)      SpO2: 98% 100%  100%   Weight: 117.3 kg (258 lb 9.6 oz)      Height: 6' 4" (1.93 m)          Abnormal Lab Results:  Labs Reviewed   CBC W/ AUTO DIFFERENTIAL - Abnormal; Notable for the following components:       Result Value    MCH 25.6 (*)     MCHC 31.2 (*)     All other components within normal limits   URINALYSIS, REFLEX TO URINE CULTURE - Abnormal; Notable for the following components:    Color, UA Colorless (*)     Specific Gravity, UA <1.005 (*)     All other components within normal limits    Narrative:     Specimen Source->Urine   COMPREHENSIVE METABOLIC PANEL   CK   TROPONIN I   B-TYPE NATRIURETIC PEPTIDE        All Lab Results:  Results for orders placed or performed during the hospital encounter of 08/22/22   CBC Auto Differential   Result Value Ref Range    WBC 5.36 3.90 - 12.70 K/uL    RBC 4.81 4.00 - 5.40 M/uL    Hemoglobin 12.3 12.0 - 16.0 g/dL    Hematocrit 39.4 37.0 - 48.5 %    MCV 82 82 - 98 fL    MCH 25.6 (L) 27.0 - 31.0 pg    MCHC 31.2 (L) 32.0 - 36.0 g/dL    RDW 12.1 11.5 - 14.5 %    Platelets 255 150 - 450 K/uL    MPV 10.5 9.2 - 12.9 fL    Immature Granulocytes 0.2 0.0 - 0.5 %    Gran # (ANC) 2.5 1.8 - 7.7 K/uL    Immature Grans (Abs) 0.01 0.00 - 0.04 K/uL    Lymph # 2.4 1.0 - 4.8 K/uL    Mono # 0.4 0.3 - 1.0 K/uL    Eos # 0.0 0.0 - 0.5 K/uL    Baso # 0.02 0.00 - 0.20 K/uL    nRBC 0 0 /100 WBC    Gran % 45.7 38.0 - 73.0 %    Lymph % 45.1 18.0 - 48.0 %    Mono % 8.2 4.0 - 15.0 %    Eosinophil % 0.4 0.0 - 8.0 %    Basophil % 0.4 0.0 - 1.9 %    Differential Method Automated    Comprehensive Metabolic Panel   Result Value Ref Range    Sodium 140 136 - 145 mmol/L    Potassium 4.2 3.5 - 5.1 mmol/L    Chloride 105 95 - 110 mmol/L    CO2 24 23 - 29 " mmol/L    Glucose 93 70 - 110 mg/dL    BUN 8 6 - 20 mg/dL    Creatinine 0.7 0.5 - 1.4 mg/dL    Calcium 9.4 8.7 - 10.5 mg/dL    Total Protein 7.3 6.0 - 8.4 g/dL    Albumin 4.1 3.5 - 5.2 g/dL    Total Bilirubin 0.5 0.1 - 1.0 mg/dL    Alkaline Phosphatase 131 55 - 135 U/L    AST 25 10 - 40 U/L    ALT 25 10 - 44 U/L    Anion Gap 11 8 - 16 mmol/L    eGFR >60 >60 mL/min/1.73 m^2   Urinalysis, Reflex to Urine Culture Urine, Clean Catch    Specimen: Urine   Result Value Ref Range    Specimen UA Urine, Clean Catch     Color, UA Colorless (A) Yellow, Straw, Marilyn    Appearance, UA Clear Clear    pH, UA 7.0 5.0 - 8.0    Specific Gravity, UA <1.005 (A) 1.005 - 1.030    Protein, UA Negative Negative    Glucose, UA Negative Negative    Ketones, UA Negative Negative    Bilirubin (UA) Negative Negative    Occult Blood UA Negative Negative    Nitrite, UA Negative Negative    Urobilinogen, UA Negative <2.0 EU/dL    Leukocytes, UA Negative Negative   CK   Result Value Ref Range    CPK 71 20 - 180 U/L   Troponin I   Result Value Ref Range    Troponin I <0.006 0.000 - 0.026 ng/mL         Imaging Results:  Imaging Results          X-Ray Chest AP Portable (Final result)  Result time 08/22/22 10:26:11    Final result by Jemal Reaves MD (08/22/22 10:26:11)                 Impression:      No acute process seen.      Electronically signed by: Jemal Reaves MD  Date:    08/22/2022  Time:    10:26             Narrative:    EXAMINATION:  XR CHEST AP PORTABLE    CLINICAL HISTORY:  chest pain;    FINDINGS:  Single view of the chest.  Comparison 03/15/2022.  Left-sided chest port tip overlies the SVC.  Sternotomy wires are intact.    Cardiac silhouette is normal.  The lungs demonstrate no evidence of active disease.  No evidence of pleural effusion or pneumothorax.  Bones demonstrate moderate degenerative change.                               The EKG was ordered, reviewed, and independently interpreted by the ED provider.  Interpretation time:  10:07  Rate: 61 BPM  Rhythm: normal sinus rhythm  Interpretation: Possible Anterior infarct, age undetermined. No STEMI.           The Emergency Provider reviewed the vital signs and test results, which are outlined above.    ED Discussion     12:04 PM: Reassessed pt at this time. Discussed with pt all pertinent ED information and results. Discussed pt dx and plan of tx. Gave pt all f/u and return to the ED instructions. All questions and concerns were addressed at this time. Pt expresses understanding of information and instructions, and is comfortable with plan to discharge. Pt is stable for discharge.    I discussed with patient and/or family/caretaker that evaluation in the ED does not suggest any emergent or life threatening medical conditions requiring immediate intervention beyond what was provided in the ED, and I believe patient is safe for discharge.  Regardless, an unremarkable evaluation in the ED does not preclude the development or presence of a serious of life threatening condition. As such, patient was instructed to return immediately for any worsening or change in current symptoms.           ED Medication(s):  Medications   heparin, porcine (PF) 100 unit/mL injection flush 500 Units (has no administration in time range)   aluminum-magnesium hydroxide-simethicone 200-200-20 mg/5 mL suspension 30 mL (30 mLs Oral Given 8/22/22 1023)     And   LIDOcaine HCl 2% oral solution 15 mL (15 mLs Oral Given 8/22/22 1024)   morphine injection 4 mg (4 mg Intravenous Given 8/22/22 1122)   diphenhydrAMINE injection 25 mg (25 mg Intravenous Given 8/22/22 1121)        Follow-up Information     Schedule an appointment as soon as possible for a visit  with The [address]    Specialty: Internal Medicine  Contact information:  [address] [facility] [address] [phone]  Additional information:  Please park on the [address] side and use the Clinic entrance. Check in on the 2nd  floor, to the right.                       New Prescriptions    CYCLOBENZAPRINE (FLEXERIL) 10 MG TABLET    Take 1 tablet (10 mg total) by mouth 3 (three) times daily as needed for Muscle spasms.    HYDROCODONE-ACETAMINOPHEN (NORCO) 5-325 MG PER TABLET    Take 1 tablet by mouth every 4 (four) hours as needed.        Medication List      START taking these medications    cyclobenzaprine 10 MG tablet  Commonly known as: FLEXERIL  Take 1 tablet (10 mg total) by mouth 3 (three) times daily as needed for Muscle spasms.     HYDROcodone-acetaminophen 5-325 mg per tablet  Commonly known as: NORCO  Take 1 tablet by mouth every 4 (four) hours as needed.        ASK your doctor about these medications    amoxicillin-clavulanate 875-125mg 875-125 mg per tablet  Commonly known as: AUGMENTIN  Take 1 tablet by mouth every 12 (twelve) hours. for 14 days     bumetanide 1 MG tablet  Commonly known as: BUMEX  Take 1 tablet (1 mg total) by mouth once daily.     calcium carbonate 600 mg calcium (1,500 mg) Tab  Commonly known as: OS-ESE     cyanocobalamin 1,000 mcg/mL injection     doxepin 150 MG Cap  Commonly known as: SINEQUAN     ferrous sulfate 325 mg (65 mg iron) Tab tablet  Commonly known as: FEOSOL     gabapentin 600 MG tablet  Commonly known as: NEURONTIN     IRON 100 PLUS ORAL     neomycin 500 mg Tab  Commonly known as: MYCIFRADIN  Take 1 tablet (500 mg total) by mouth 2 (two) times daily. for 14 days     potassium chloride 20 mEq Pack  Commonly known as: KLOR-CON  Take 40 mEq by mouth once daily.     promethazine 25 MG suppository  Commonly known as: PHENERGAN     spironolactone 25 MG tablet  Commonly known as: ALDACTONE  Take 1 tablet (25 mg total) by mouth once daily.     tiZANidine 4 MG tablet  Commonly known as: ZANAFLEX     VITAMIN D ORAL           Where to Get Your Medications      These medications were sent to [id number] - [place] [facility] [address] [facility]  [address] [facility] [place] [address]     Phone: [phone]   · cyclobenzaprine 10 MG tablet  · HYDROcodone-acetaminophen 5-325 mg per tablet           Medical Decision Making    Medical Decision Making:   Clinical Tests:   Lab Tests: Ordered and Reviewed  Radiological Study: Ordered and Reviewed  Medical Tests: Ordered and Reviewed           Scribe Attestation:   Scribe #1: I performed the above scribed service and the documentation accurately describes the services I performed. I attest to the accuracy of the note.    Attending:   Physician Attestation Statement for Scribe #1: I, Nikhil Block MD, personally performed the services described in this documentation, as scribed by Ronit Pulido, in my presence, and it is both accurate and complete.          Clinical Impression       ICD-10-CM ICD-9-CM   1. Chest pain  R07.9 786.50       Disposition:   Disposition: Discharged  Condition: Stable         Nikhil Block MD  08/22/22 4323

## 2022-09-09 ENCOUNTER — LAB VISIT (OUTPATIENT)
Dept: LAB | Facility: HOSPITAL | Age: 47
End: 2022-09-09
Attending: FAMILY MEDICINE
Payer: MEDICARE

## 2022-09-09 ENCOUNTER — TELEPHONE (OUTPATIENT)
Dept: PSYCHIATRY | Facility: CLINIC | Age: 47
End: 2022-09-09
Payer: MEDICARE

## 2022-09-09 ENCOUNTER — OFFICE VISIT (OUTPATIENT)
Dept: PRIMARY CARE CLINIC | Facility: CLINIC | Age: 47
End: 2022-09-09
Payer: MEDICARE

## 2022-09-09 VITALS
HEART RATE: 71 BPM | WEIGHT: 259.25 LBS | TEMPERATURE: 98 F | DIASTOLIC BLOOD PRESSURE: 80 MMHG | SYSTOLIC BLOOD PRESSURE: 134 MMHG | HEIGHT: 72 IN | BODY MASS INDEX: 35.11 KG/M2

## 2022-09-09 DIAGNOSIS — F41.9 ANXIETY AND DEPRESSION: ICD-10-CM

## 2022-09-09 DIAGNOSIS — R63.5 WEIGHT GAIN: ICD-10-CM

## 2022-09-09 DIAGNOSIS — I87.2 VENOUS INSUFFICIENCY: ICD-10-CM

## 2022-09-09 DIAGNOSIS — Z76.89 ENCOUNTER TO ESTABLISH CARE: Primary | ICD-10-CM

## 2022-09-09 DIAGNOSIS — F32.A ANXIETY AND DEPRESSION: ICD-10-CM

## 2022-09-09 DIAGNOSIS — G47.9 SLEEP DIFFICULTIES: ICD-10-CM

## 2022-09-09 DIAGNOSIS — Z11.4 ENCOUNTER FOR SCREENING FOR HIV: ICD-10-CM

## 2022-09-09 DIAGNOSIS — R10.9 CHRONIC ABDOMINAL PAIN: ICD-10-CM

## 2022-09-09 DIAGNOSIS — Z13.220 ENCOUNTER FOR LIPID SCREENING FOR CARDIOVASCULAR DISEASE: ICD-10-CM

## 2022-09-09 DIAGNOSIS — Z11.59 ENCOUNTER FOR HCV SCREENING TEST FOR LOW RISK PATIENT: ICD-10-CM

## 2022-09-09 DIAGNOSIS — E53.8 B12 DEFICIENCY: ICD-10-CM

## 2022-09-09 DIAGNOSIS — G89.29 CHRONIC ABDOMINAL PAIN: ICD-10-CM

## 2022-09-09 DIAGNOSIS — Z13.6 ENCOUNTER FOR LIPID SCREENING FOR CARDIOVASCULAR DISEASE: ICD-10-CM

## 2022-09-09 LAB
HCV AB SERPL QL IA: NORMAL
VIT B12 SERPL-MCNC: 218 PG/ML (ref 210–950)

## 2022-09-09 PROCEDURE — 99999 PR PBB SHADOW E&M-EST. PATIENT-LVL V: CPT | Mod: PBBFAC,,, | Performed by: FAMILY MEDICINE

## 2022-09-09 PROCEDURE — 99205 PR OFFICE/OUTPT VISIT, NEW, LEVL V, 60-74 MIN: ICD-10-PCS | Mod: S$PBB,,, | Performed by: FAMILY MEDICINE

## 2022-09-09 PROCEDURE — 99999 PR PBB SHADOW E&M-EST. PATIENT-LVL V: ICD-10-PCS | Mod: PBBFAC,,, | Performed by: FAMILY MEDICINE

## 2022-09-09 PROCEDURE — 36415 COLL VENOUS BLD VENIPUNCTURE: CPT | Mod: PN | Performed by: FAMILY MEDICINE

## 2022-09-09 PROCEDURE — 86803 HEPATITIS C AB TEST: CPT | Performed by: FAMILY MEDICINE

## 2022-09-09 PROCEDURE — 99215 OFFICE O/P EST HI 40 MIN: CPT | Mod: PBBFAC,PN | Performed by: FAMILY MEDICINE

## 2022-09-09 PROCEDURE — 99205 OFFICE O/P NEW HI 60 MIN: CPT | Mod: S$PBB,,, | Performed by: FAMILY MEDICINE

## 2022-09-09 PROCEDURE — 82607 VITAMIN B-12: CPT | Performed by: FAMILY MEDICINE

## 2022-09-09 RX ORDER — POTASSIUM CHLORIDE 1.5 G/1.58G
40 POWDER, FOR SOLUTION ORAL DAILY
Qty: 90 PACKET | Refills: 3 | Status: SHIPPED | OUTPATIENT
Start: 2022-09-09 | End: 2022-09-09 | Stop reason: SDUPTHER

## 2022-09-09 RX ORDER — TRAZODONE HYDROCHLORIDE 150 MG/1
150 TABLET ORAL NIGHTLY
Qty: 90 TABLET | Refills: 3 | Status: SHIPPED | OUTPATIENT
Start: 2022-09-09 | End: 2023-03-09 | Stop reason: SDUPTHER

## 2022-09-09 RX ORDER — TIZANIDINE 4 MG/1
8 TABLET ORAL 3 TIMES DAILY
Qty: 540 TABLET | Refills: 3 | Status: SHIPPED | OUTPATIENT
Start: 2022-09-09 | End: 2023-03-09 | Stop reason: SDUPTHER

## 2022-09-09 RX ORDER — CYANOCOBALAMIN 1000 UG/ML
1000 INJECTION, SOLUTION INTRAMUSCULAR; SUBCUTANEOUS
Qty: 3 ML | Refills: 3 | Status: SHIPPED | OUTPATIENT
Start: 2022-09-09 | End: 2023-03-09 | Stop reason: SDUPTHER

## 2022-09-09 RX ORDER — BUMETANIDE 1 MG/1
1 TABLET ORAL DAILY
Qty: 90 TABLET | Refills: 3 | Status: SHIPPED | OUTPATIENT
Start: 2022-09-09 | End: 2023-03-09 | Stop reason: SDUPTHER

## 2022-09-09 RX ORDER — BUMETANIDE 1 MG/1
1 TABLET ORAL DAILY
Qty: 90 TABLET | Refills: 3 | Status: SHIPPED | OUTPATIENT
Start: 2022-09-09 | End: 2022-09-09 | Stop reason: SDUPTHER

## 2022-09-09 RX ORDER — VENLAFAXINE HYDROCHLORIDE 37.5 MG/1
37.5 CAPSULE, EXTENDED RELEASE ORAL DAILY
Qty: 90 CAPSULE | Refills: 3 | Status: SHIPPED | OUTPATIENT
Start: 2022-09-09 | End: 2023-03-09 | Stop reason: SDUPTHER

## 2022-09-09 RX ORDER — GABAPENTIN 600 MG/1
600 TABLET ORAL 3 TIMES DAILY
Qty: 270 TABLET | Refills: 3 | Status: SHIPPED | OUTPATIENT
Start: 2022-09-09 | End: 2023-03-09 | Stop reason: SDUPTHER

## 2022-09-09 RX ORDER — POTASSIUM CHLORIDE 1.5 G/1.58G
40 POWDER, FOR SOLUTION ORAL DAILY
Qty: 90 PACKET | Refills: 3 | Status: SHIPPED | OUTPATIENT
Start: 2022-09-09 | End: 2023-03-09 | Stop reason: SDUPTHER

## 2022-09-09 NOTE — PATIENT INSTRUCTIONS
Lots of things going on today.      Blood work done today.  Results will be posted to [facility] as soon as they are available.    Paperwork for handicap tag filled out.  Bring this to the [employer] to get your tags.      Medication refills sent to pharmacy today.      Instead of doxepin, let us try using trazodone nightly for sleep.  This can also help with anxiety.  Referral also placed to our sleep clinic to discuss sleep difficulties, need for CPAP, etc..      For the anxiety and depressive symptoms, in addition to the trazodone above, let us start you on a daily affects or.  This will help smooth out the highs and lows.  Referral also placed for our Psychology Department for therapy/counseling and possible medication management.      Regarding the weight gain, would like you to meet with one of our providers in our lifestyle and wellness department.  They will talk with you about maximizing/optimizing your diet, exercise, etc..  If appropriate, they can also discuss medications that can help with weight gain/loss.    Keep her follow-up appointments with GI, as scheduled.  When you see them next month, be sure to ask about colon cancer screening.      Keep your follow-up appointments with Cardiology, as scheduled.      Continue to eat a healthy diet.  Be careful with portion sizes.  Includes lots of fresh fruits, vegetables, whole grains, lean proteins.  See info below.    Keep hydrated.  Be sure to drink at least 8-10, 8 oz, glasses of water every day.    Stay active.  Try to do some sort of physical activity every day.  Nothing outrageous, just try walking for 10-15 minutes each day.

## 2022-09-09 NOTE — PROGRESS NOTES
"    [facility]       [address] [place] [address]      Phone: [phone]      Fax: [phone]    Scott Escobar MD                Office Visit  09/09/2022        Subjective      HPI:  Viri Bird is a 47 y.o. female presents today in clinic for "Establish Care (Need refills)  ."     47-year-old female presents today to establish care, discuss multiple issues.      Patient states she has really been struggling with anxiety and depression lately.  States she recently moved to this area from [place].  She also got  recently.  She has a son who went into the [employer] (in [place]) right after she got .  These have all been significant life changes for her in a short period of time.  She is trying to learn to let go.  She has always been very independent, but states her  is old school.  So she is trying to adjust.  She tends to get easily annoyed, angered.  When this happens, it triggers migraines.  She recently got upset because she could not go to her mother's grave in [place] for mother's day or birthday to leave flowers.  She has tried to go for one occasion, but the weather prohibited it.  's work schedule does not always allow for them to travel together and he does not particularly want her going by herself.  She is not currently taking any medications for stress, anxiety.  Has been keeping a journal.  Would like to talk with a psychologist or counselor.    She also reports chronic abdominal pains.  Had a gastric bypass with revision years ago.  Since then, has also had multiple procedures to repair/remove adhesions.  At another point, she developed an abdominal abscess.  All of these surgeries left her somewhat disfigured with scars.  She gets abdominal pains, cramps, frequently.  Recently got established with Gastroenterology and discovered she had a bacterial infection, as well.  Currently, she takes tizanidine and " gabapentin, which helps her abdominal pains.    She also reports recent weight gain.  She feels like she is emotionally eating with all of her stress, mentioned above.  She has been snacking a lot.  She is trying to choose healthy options.      She also reports her blood pressure has been fluctuating lately.  Normally, it runs 100/50.  She went to the ER a couple of weeks ago with some chest pain, shortness a breath.  Everything checked out okay.  Her blood pressure was very high in the ER, it was 220/115.  Today, it is 134/80.  She does see cardiology regularly.  Has had issues with fluid retention.  Currently takes Bumex 1 mg daily, spironolactone 25 mg daily, potassium supplement 40 mEq daily.    She mentions sleep issues.  She tends to go to bed around 930 p.m..  By 10:00 a.m. she usually falls asleep.  She often wakes up at 2:30 a.m. and has trouble falling back asleep.  This gets her frustrated, she gets headaches.  She typically has to get up around 7:00 a.m..  Has been taking doxepin at bedtime, along with a white noise generator.  In the past, she has tried Abilify, Ambien, trazodone.  None of these worked very well.  The doxepin works, but it is expensive.    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    PSH: Gastric bypass with revision.  Adhesion removal x2.  Right lung double lobectomy.  Knee.  CABG with power port.  FMH:  Lung cancer-smoker.  CAD/mi.  HTN.  DM.    Allergies:  Multiple.  See epic.    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    T: Denies   A:  Rarely   D:  Denies     Exercise:  Walks daily approximately 1.5-2 miles.    Cards: Preet ([facility])  GI: Scott ([facility])  GYN: Codi ([facility])    Pap: 22  MM22      The following were updated and reviewed by myself in the chart: medications, past medical history, past surgical history, family history, social history, and allergies.     Medications:  Current Outpatient Medications on  File Prior to Visit   Medication Sig Dispense Refill    calcium carbonate (OS-ESE) 600 mg calcium (1,500 mg) Tab Take 600 mg by mouth.      ergocalciferol, vitamin D2, (VITAMIN D ORAL)       ferrous sulfate (FEOSOL) 325 mg (65 mg iron) Tab tablet Take 325 mg by mouth.      iron,carb/vit C/vit B12/folic (IRON 100 PLUS ORAL)       promethazine (PHENERGAN) 25 MG suppository Place 25 mg rectally daily as needed.      spironolactone (ALDACTONE) 25 MG tablet Take 1 tablet (25 mg total) by mouth once daily. 90 tablet 3    [DISCONTINUED] bumetanide (BUMEX) 1 MG tablet Take 1 tablet (1 mg total) by mouth once daily. 90 tablet 3    [DISCONTINUED] cyanocobalamin 1,000 mcg/mL injection Inject 1,000 mcg into the muscle.      [DISCONTINUED] doxepin (SINEQUAN) 150 MG Cap Take 150 mg by mouth.      [DISCONTINUED] gabapentin (NEURONTIN) 600 MG tablet Take 600 mg by mouth.      [DISCONTINUED] potassium chloride (KLOR-CON) 20 mEq Pack Take 40 mEq by mouth once daily. 90 packet 3    [DISCONTINUED] tiZANidine (ZANAFLEX) 4 MG tablet Take 8 mg by mouth.       No current facility-administered medications on file prior to visit.        PMHx:  Past Medical History:   Diagnosis Date    Anemia 02/01/1990    Anxiety 08/01/2010    Depression 2008    Encounter for blood transfusion 01/15/2008    Endometriosis of uterus 05/01/1999    Fibroid 2010    Gestational diabetes     Heart disease     Heart murmur 01/14/2008    Hypertension 2009    Marfan syndrome       Patient Active Problem List    Diagnosis Date Noted    Abdominal bloating 05/17/2022    Coronary artery disease 03/14/2022    Essential (primary) hypertension  03/14/2022    Marfan's syndrome with other cardiovascular manifestations 03/14/2022    Venous insufficiency 03/14/2022        PSHx:  Past Surgical History:   Procedure Laterality Date    ABDOMINAL SURGERY  06/04/2006    several-had abscesses and complications after wt loss surgery; repair of small bowl injury; subsequent colon  resection 19    APPENDECTOMY  2009    Rupture    BREAST BIOPSY Left 2019    CERVIX LESION DESTRUCTION      cryo for cervical dysplasia    CHOLECYSTECTOMY      COLONOSCOPY  2018    Normal    CORONARY ARTERY BYPASS GRAFT (CABG)  2008    DILATION AND CURETTAGE OF UTERUS      Miscarriage    LUNG REMOVAL, PARTIAL Right     2/3 of right lung removed; thought to be lung cancer, final path equals histoplasmosis    CHANDA-EN-Y PROCEDURE          FHx:  Family History   Problem Relation Age of Onset    Hypertension Mother     Rheum arthritis Mother     Cancer Mother     Diabetes Mother         Type 2 w/insulin    Lung cancer Mother         Lung cancer complications at 63    Heart attack Maternal Grandmother     Diabetes Maternal Grandmother         Brother Type 2    Hyperlipidemia Maternal Grandfather     Heart failure Maternal Grandfather     Heart failure Paternal Grandmother          from Heart Attack    Rheum arthritis Maternal Uncle         Blood sepsis    Cancer Maternal Aunt         Ovarian cancer    Cancer Maternal Aunt         Brain cancer    Migraines Brother         Social:  Social History     Socioeconomic History    Marital status:    Tobacco Use    Smoking status: Never    Smokeless tobacco: Never   Substance and Sexual Activity    Alcohol use: Not Currently     Comment: Once every other month    Drug use: Never    Sexual activity: Yes     Partners: Male     Birth control/protection: None     Comment:          Allergies:  Review of patient's allergies indicates:   Allergen Reactions    Aspirin Anaphylaxis     Tongue swelling      Cephalexin Anaphylaxis and Nausea And Vomiting    Iodine and iodide containing products Other (See Comments)     Pt is not allergic to contrast dye IV     Levofloxacin Anaphylaxis    Sulfa (sulfonamide antibiotics) Anaphylaxis, Blisters, Dermatitis, Itching and Shortness Of Breath    Sulfamethoxazole-trimethoprim Anaphylaxis     3rd degree skin burning when given  "IV      Sulfasalazine Anaphylaxis    Tramadol Nausea And Vomiting    Adhesive Dermatitis, Hives and Itching     bandaids and adhesive on electrodes  bandaids and adhesive on electrodes, whelps      Adhesive tape-silicones Itching    Barium iodide Nausea And Vomiting     Oral only causes vomiting        ROS:  Review of Systems   Constitutional:  Negative for activity change, appetite change, chills and fever.   HENT:  Negative for congestion, postnasal drip, rhinorrhea, sore throat and trouble swallowing.    Respiratory:  Negative for cough, shortness of breath and wheezing.    Cardiovascular:  Negative for chest pain and palpitations.   Gastrointestinal:  Positive for abdominal distention and abdominal pain. Negative for constipation, diarrhea, nausea and vomiting.   Genitourinary:  Negative for difficulty urinating.   Musculoskeletal:  Negative for arthralgias and myalgias.   Skin:  Negative for color change and rash.   Neurological:  Negative for speech difficulty and headaches.   Psychiatric/Behavioral:  Positive for dysphoric mood and sleep disturbance. The patient is nervous/anxious.    All other systems reviewed and are negative.       Objective      /80   Pulse 71   Temp 97.7 °F (36.5 °C)   Ht 6' 4" (1.93 m)   Wt 117.6 kg (259 lb 4.2 oz)   BMI 31.56 kg/m²   Ht Readings from Last 3 Encounters:   09/09/22 6' 4" (1.93 m)   08/22/22 6' 4" (1.93 m)   08/16/22 6' 4.5" (1.943 m)     Wt Readings from Last 3 Encounters:   09/09/22 117.6 kg (259 lb 4.2 oz)   08/22/22 117.3 kg (258 lb 9.6 oz)   08/16/22 118.4 kg (261 lb 0.4 oz)       PHYSICAL EXAM:  Physical Exam  Vitals and nursing note reviewed.   Constitutional:       General: She is not in acute distress.     Appearance: Normal appearance.   HENT:      Head: Normocephalic and atraumatic.      Right Ear: Tympanic membrane, ear canal and external ear normal.      Left Ear: Tympanic membrane, ear canal and external ear normal.      Nose: Nose normal. No " congestion or rhinorrhea.      Mouth/Throat:      Mouth: Mucous membranes are moist.      Pharynx: Oropharynx is clear. No oropharyngeal exudate or posterior oropharyngeal erythema.   Eyes:      Extraocular Movements: Extraocular movements intact.      Conjunctiva/sclera: Conjunctivae normal.      Pupils: Pupils are equal, round, and reactive to light.   Cardiovascular:      Rate and Rhythm: Normal rate and regular rhythm.   Pulmonary:      Effort: Pulmonary effort is normal. No respiratory distress.      Breath sounds: No wheezing, rhonchi or rales.   Musculoskeletal:         General: Normal range of motion.      Cervical back: Normal range of motion.   Lymphadenopathy:      Cervical: No cervical adenopathy.   Skin:     General: Skin is warm and dry.      Findings: No rash.   Neurological:      Mental Status: She is alert.            LABS / IMAGING:  Recent Results (from the past 4368 hour(s))   CBC Without Differential    Collection Time: 03/18/22  7:11 AM   Result Value Ref Range    WBC 4.83 3.90 - 12.70 K/uL    RBC 4.81 4.00 - 5.40 M/uL    Hemoglobin 12.8 12.0 - 16.0 g/dL    Hematocrit 41.8 37.0 - 48.5 %    MCV 87 82 - 98 fL    MCH 26.6 (L) 27.0 - 31.0 pg    MCHC 30.6 (L) 32.0 - 36.0 g/dL    RDW 12.8 11.5 - 14.5 %    Platelets 197 150 - 450 K/uL    MPV 11.8 9.2 - 12.9 fL   Comprehensive Metabolic Panel    Collection Time: 03/18/22  7:11 AM   Result Value Ref Range    Sodium 140 136 - 145 mmol/L    Potassium 4.3 3.5 - 5.1 mmol/L    Chloride 106 95 - 110 mmol/L    CO2 24 23 - 29 mmol/L    Glucose 84 70 - 110 mg/dL    BUN 13 6 - 20 mg/dL    Creatinine 0.7 0.5 - 1.4 mg/dL    Calcium 9.6 8.7 - 10.5 mg/dL    Total Protein 6.8 6.0 - 8.4 g/dL    Albumin 3.8 3.5 - 5.2 g/dL    Total Bilirubin 0.6 0.1 - 1.0 mg/dL    Alkaline Phosphatase 115 55 - 135 U/L    AST 22 10 - 40 U/L    ALT 28 10 - 44 U/L    Anion Gap 10 8 - 16 mmol/L    eGFR if African American >60.0 >60 mL/min/1.73 m^2    eGFR if non African American >60.0 >60  "mL/min/1.73 m^2   TSH    Collection Time: 03/18/22  7:11 AM   Result Value Ref Range    TSH 1.675 0.400 - 4.000 uIU/mL   Lipid Panel    Collection Time: 03/18/22  7:11 AM   Result Value Ref Range    Cholesterol 126 120 - 199 mg/dL    Triglycerides 83 30 - 150 mg/dL    HDL 50 40 - 75 mg/dL    LDL Cholesterol 59.4 (L) 63.0 - 159.0 mg/dL    HDL/Cholesterol Ratio 39.7 20.0 - 50.0 %    Total Cholesterol/HDL Ratio 2.5 2.0 - 5.0    Non-HDL Cholesterol 76 mg/dL   Echo    Collection Time: 03/24/22  3:51 PM   Result Value Ref Range    BSA 2.53 m2    TDI SEPTAL 0.06 m/s    LV LATERAL E/E' RATIO 8.67 m/s    LV SEPTAL E/E' RATIO 17.33 m/s    LA WIDTH 4.36 cm    TDI LATERAL 0.12 m/s    LVIDd 5.18 3.5 - 6.0 cm    IVS 0.79 0.6 - 1.1 cm    Posterior Wall 0.81 0.6 - 1.1 cm    LVIDs 3.25 2.1 - 4.0 cm    FS 37 28 - 44 %    LA volume 87.46 cm3    Sinus 3.66 cm    STJ 3.35 cm    Ascending aorta 3.86 cm    LV mass 144.26 g    LA size 4.24 cm    RVDD 3.97 cm    TAPSE 1.66 cm    RV S' 11.39 cm/s    Left Ventricle Relative Wall Thickness 0.31 cm    AV mean gradient 5 mmHg    AV valve area 2.95 cm2    AV Velocity Ratio 0.87     AV index (prosthetic) 0.87     MV valve area p 1/2 method 3.76 cm2    E/A ratio 1.27     Mean e' 0.09 m/s    E wave deceleration time 201.82 msec    IVRT 51.38 msec    MV "A" wave duration 20.27 msec    Pulm vein S/D ratio 0.66     LVOT diameter 2.08 cm    LVOT area 3.4 cm2    LVOT peak jamarcus 1.32 m/s    LVOT peak VTI 29.20 cm    Ao peak jamarcus 1.51 m/s    Ao VTI 33.63 cm    LVOT stroke volume 99.17 cm3    AV peak gradient 9 mmHg    E/E' ratio 11.56 m/s    MV Peak E Jamarcus 1.04 m/s    TR Max Jamarcus 2.86 m/s    MV stenosis pressure 1/2 time 58.53 ms    MV Peak A Jamarcus 0.82 m/s    PV Peak S Jamarcus 0.56 m/s    PV Peak D Jamarcus 0.85 m/s    LV Systolic Volume 42.55 mL    LV Systolic Volume Index 17.1 mL/m2    LV Diastolic Volume 128.20 mL    LV Diastolic Volume Index 51.49 mL/m2    LA Volume Index 35.1 mL/m2    LV Mass Index 58 g/m2    RA " Major Axis 5.19 cm    Left Atrium Minor Axis 5.80 cm    Left Atrium Major Axis 5.35 cm    Triscuspid Valve Regurgitation Peak Gradient 33 mmHg    LA Volume Index (Mod) 32.9 mL/m2    LA volume (mod) 81.99 cm3    RA Width 4.22 cm    Right Atrial Pressure (from IVC) 3 mmHg    EF 65 %    TV rest pulmonary artery pressure 36 mmHg   IGP,rfx Aptima HPV all pth    Collection Time: 06/29/22 10:22 AM   Result Value Ref Range    DIAGNOSIS: Comment     Specimen adequacy: Comment     Clinician Provided ICD10 Comment     Performed by: Comment     . .     Note: Comment     Test Methodology: Comment     . Comment    CBC Auto Differential    Collection Time: 08/22/22 10:40 AM   Result Value Ref Range    WBC 5.36 3.90 - 12.70 K/uL    RBC 4.81 4.00 - 5.40 M/uL    Hemoglobin 12.3 12.0 - 16.0 g/dL    Hematocrit 39.4 37.0 - 48.5 %    MCV 82 82 - 98 fL    MCH 25.6 (L) 27.0 - 31.0 pg    MCHC 31.2 (L) 32.0 - 36.0 g/dL    RDW 12.1 11.5 - 14.5 %    Platelets 255 150 - 450 K/uL    MPV 10.5 9.2 - 12.9 fL    Immature Granulocytes 0.2 0.0 - 0.5 %    Gran # (ANC) 2.5 1.8 - 7.7 K/uL    Immature Grans (Abs) 0.01 0.00 - 0.04 K/uL    Lymph # 2.4 1.0 - 4.8 K/uL    Mono # 0.4 0.3 - 1.0 K/uL    Eos # 0.0 0.0 - 0.5 K/uL    Baso # 0.02 0.00 - 0.20 K/uL    nRBC 0 0 /100 WBC    Gran % 45.7 38.0 - 73.0 %    Lymph % 45.1 18.0 - 48.0 %    Mono % 8.2 4.0 - 15.0 %    Eosinophil % 0.4 0.0 - 8.0 %    Basophil % 0.4 0.0 - 1.9 %    Differential Method Automated    Comprehensive Metabolic Panel    Collection Time: 08/22/22 10:40 AM   Result Value Ref Range    Sodium 140 136 - 145 mmol/L    Potassium 4.2 3.5 - 5.1 mmol/L    Chloride 105 95 - 110 mmol/L    CO2 24 23 - 29 mmol/L    Glucose 93 70 - 110 mg/dL    BUN 8 6 - 20 mg/dL    Creatinine 0.7 0.5 - 1.4 mg/dL    Calcium 9.4 8.7 - 10.5 mg/dL    Total Protein 7.3 6.0 - 8.4 g/dL    Albumin 4.1 3.5 - 5.2 g/dL    Total Bilirubin 0.5 0.1 - 1.0 mg/dL    Alkaline Phosphatase 131 55 - 135 U/L    AST 25 10 - 40 U/L    ALT 25 10  - 44 U/L    Anion Gap 11 8 - 16 mmol/L    eGFR >60 >60 mL/min/1.73 m^2   CK    Collection Time: 08/22/22 10:40 AM   Result Value Ref Range    CPK 71 20 - 180 U/L   Troponin I    Collection Time: 08/22/22 10:40 AM   Result Value Ref Range    Troponin I <0.006 0.000 - 0.026 ng/mL   Urinalysis, Reflex to Urine Culture Urine, Clean Catch    Collection Time: 08/22/22 11:22 AM    Specimen: Urine   Result Value Ref Range    Specimen UA Urine, Clean Catch     Color, UA Colorless (A) Yellow, Straw, Marilyn    Appearance, UA Clear Clear    pH, UA 7.0 5.0 - 8.0    Specific Gravity, UA <1.005 (A) 1.005 - 1.030    Protein, UA Negative Negative    Glucose, UA Negative Negative    Ketones, UA Negative Negative    Bilirubin (UA) Negative Negative    Occult Blood UA Negative Negative    Nitrite, UA Negative Negative    Urobilinogen, UA Negative <2.0 EU/dL    Leukocytes, UA Negative Negative         Assessment    1. Encounter to establish care    2. Anxiety and depression    3. Sleep difficulties    4. Chronic abdominal pain    5. Venous insufficiency    6. B12 deficiency    7. Weight gain    8. Encounter for HCV screening test for low risk patient    9. Encounter for screening for HIV    10. Encounter for lipid screening for cardiovascular disease          Ramon    Viri was seen today for establish care.    Diagnoses and all orders for this visit:    Encounter to establish care    Anxiety and depression  -     Cancel: CBC Auto Differential; Future  -     Cancel: Comprehensive Metabolic Panel; Future  -     Cancel: TSH; Future  -     traZODone (DESYREL) 150 MG tablet; Take 1 tablet (150 mg total) by mouth every evening.  -     venlafaxine (EFFEXOR-XR) 37.5 MG 24 hr capsule; Take 1 capsule (37.5 mg total) by mouth once daily.  -     Ambulatory referral/consult to Psychology; Future  -     CBC Auto Differential; Future    Sleep difficulties  -     Cancel: CBC Auto Differential; Future  -     Cancel: Comprehensive Metabolic Panel;  Future  -     Cancel: TSH; Future  -     traZODone (DESYREL) 150 MG tablet; Take 1 tablet (150 mg total) by mouth every evening.  -     Ambulatory referral/consult to Sleep Disorders; Future  -     Ambulatory referral/consult to Psychology; Future  -     CBC Auto Differential; Future    Chronic abdominal pain  -     gabapentin (NEURONTIN) 600 MG tablet; Take 1 tablet (600 mg total) by mouth 3 (three) times daily.  -     tiZANidine (ZANAFLEX) 4 MG tablet; Take 2 tablets (8 mg total) by mouth 3 (three) times daily.    Venous insufficiency  -     Discontinue: bumetanide (BUMEX) 1 MG tablet; Take 1 tablet (1 mg total) by mouth once daily.  -     Discontinue: potassium chloride (KLOR-CON) 20 mEq Pack; Take 40 mEq by mouth once daily.  -     bumetanide (BUMEX) 1 MG tablet; Take 1 tablet (1 mg total) by mouth once daily.  -     potassium chloride (KLOR-CON) 20 mEq Pack; Take 40 mEq by mouth once daily.    B12 deficiency  -     VITAMIN B12; Future  -     cyanocobalamin 1,000 mcg/mL injection; Inject 1 mL (1,000 mcg total) into the muscle every 30 days.    Weight gain  -     Cancel: TSH; Future  -     Ambulatory referral/consult to [facility]; Future    Encounter for HCV screening test for low risk patient  -     Hepatitis C Antibody; Future    Encounter for screening for HIV  -     Cancel: HIV 1/2 Ag/Ab (4th Gen); Future  -     HIV 1/2 Ag/Ab (4th Gen); Future    Encounter for lipid screening for cardiovascular disease  -     Cancel: Lipid Panel; Future  -     Lipid Panel; Future      Lots of things going on today.      PHQ-9 scores 17  ALAN-7 scores 15    Recommended we start on Effexor to help with anxiety/depressive symptoms.  She wants an alternative to the doxepin for sleep, so recommended trazodone 150 mg as it can help with anxiety/depression as well.  Also recommended therapy.  Referral placed to Psychology today.      Also recommended referral to sleep clinic for other options for Sleep Medicine,  rule out JEANNIE/need for CPAP.    For weight gain, referral to lifestyle and wellness discussed diet, exercise.  They can also suggest medical options, if available.    Blood pressure appears fine today.  Will continue to monitor.      Forms filled out for [employer] for handicap tag.    FOLLOW-UP:  Follow up in about 6 months (around 3/9/2023) for check up.    I spent a total of 65 minutes face to face and non-face to face on the date of this visit.This includes time preparing to see the patient (eg, review of tests, notes), obtaining and/or reviewing additional history from an independent historian and/or outside medical records, documenting clinical information in the electronic health record, independently interpreting results and/or communicating results to the patient/family/caregiver, or care coordinator.    Signed by:  Scott Escobar MD

## 2022-09-12 NOTE — PROGRESS NOTES
Ms. Ortez,     You should be able to see the results of some of your blood work in [facility].      You were negative for hepatitis-C.    B12 level was in the normal range, but at the very low end.  Would definitely keep taking the B12 injections.    Once we get the rest of the results back, we will forward them to you.

## 2022-09-13 ENCOUNTER — LAB VISIT (OUTPATIENT)
Dept: LAB | Facility: HOSPITAL | Age: 47
End: 2022-09-13
Attending: FAMILY MEDICINE
Payer: MEDICARE

## 2022-09-13 DIAGNOSIS — F32.A ANXIETY AND DEPRESSION: ICD-10-CM

## 2022-09-13 DIAGNOSIS — Z13.220 ENCOUNTER FOR LIPID SCREENING FOR CARDIOVASCULAR DISEASE: ICD-10-CM

## 2022-09-13 DIAGNOSIS — G47.9 SLEEP DIFFICULTIES: ICD-10-CM

## 2022-09-13 DIAGNOSIS — F41.9 ANXIETY AND DEPRESSION: ICD-10-CM

## 2022-09-13 DIAGNOSIS — Z11.4 ENCOUNTER FOR SCREENING FOR HIV: ICD-10-CM

## 2022-09-13 DIAGNOSIS — Z13.6 ENCOUNTER FOR LIPID SCREENING FOR CARDIOVASCULAR DISEASE: ICD-10-CM

## 2022-09-13 LAB
BASOPHILS # BLD AUTO: 0.03 K/UL (ref 0–0.2)
BASOPHILS NFR BLD: 0.6 % (ref 0–1.9)
CHOLEST SERPL-MCNC: 141 MG/DL (ref 120–199)
CHOLEST/HDLC SERPL: 3 {RATIO} (ref 2–5)
DIFFERENTIAL METHOD: ABNORMAL
EOSINOPHIL # BLD AUTO: 0.1 K/UL (ref 0–0.5)
EOSINOPHIL NFR BLD: 0.9 % (ref 0–8)
ERYTHROCYTE [DISTWIDTH] IN BLOOD BY AUTOMATED COUNT: 12.7 % (ref 11.5–14.5)
HCT VFR BLD AUTO: 38.3 % (ref 37–48.5)
HDLC SERPL-MCNC: 47 MG/DL (ref 40–75)
HDLC SERPL: 33.3 % (ref 20–50)
HGB BLD-MCNC: 12 G/DL (ref 12–16)
HIV 1+2 AB+HIV1 P24 AG SERPL QL IA: NORMAL
IMM GRANULOCYTES # BLD AUTO: 0.01 K/UL (ref 0–0.04)
IMM GRANULOCYTES NFR BLD AUTO: 0.2 % (ref 0–0.5)
LDLC SERPL CALC-MCNC: 76.6 MG/DL (ref 63–159)
LYMPHOCYTES # BLD AUTO: 2.2 K/UL (ref 1–4.8)
LYMPHOCYTES NFR BLD: 41.5 % (ref 18–48)
MCH RBC QN AUTO: 26 PG (ref 27–31)
MCHC RBC AUTO-ENTMCNC: 31.3 G/DL (ref 32–36)
MCV RBC AUTO: 83 FL (ref 82–98)
MONOCYTES # BLD AUTO: 0.5 K/UL (ref 0.3–1)
MONOCYTES NFR BLD: 10.1 % (ref 4–15)
NEUTROPHILS # BLD AUTO: 2.5 K/UL (ref 1.8–7.7)
NEUTROPHILS NFR BLD: 46.7 % (ref 38–73)
NONHDLC SERPL-MCNC: 94 MG/DL
NRBC BLD-RTO: 0 /100 WBC
PLATELET # BLD AUTO: 227 K/UL (ref 150–450)
PMV BLD AUTO: 11.8 FL (ref 9.2–12.9)
RBC # BLD AUTO: 4.61 M/UL (ref 4–5.4)
TRIGL SERPL-MCNC: 87 MG/DL (ref 30–150)
WBC # BLD AUTO: 5.35 K/UL (ref 3.9–12.7)

## 2022-09-13 PROCEDURE — 87389 HIV-1 AG W/HIV-1&-2 AB AG IA: CPT | Performed by: FAMILY MEDICINE

## 2022-09-13 PROCEDURE — 80061 LIPID PANEL: CPT | Performed by: FAMILY MEDICINE

## 2022-09-13 PROCEDURE — 85025 COMPLETE CBC W/AUTO DIFF WBC: CPT | Performed by: FAMILY MEDICINE

## 2022-09-13 PROCEDURE — 36415 COLL VENOUS BLD VENIPUNCTURE: CPT | Mod: PO | Performed by: FAMILY MEDICINE

## 2022-09-14 NOTE — PROGRESS NOTES
Ms. Ortez,     You should be able to see the results of the rest of your blood work in [facility].  Everything looks good!     Blood counts all look fine.  You are negative for HIV.      Cholesterol levels are all perfect!

## 2022-09-20 ENCOUNTER — OFFICE VISIT (OUTPATIENT)
Dept: PRIMARY CARE CLINIC | Facility: CLINIC | Age: 47
End: 2022-09-20
Payer: MEDICARE

## 2022-09-20 VITALS
DIASTOLIC BLOOD PRESSURE: 80 MMHG | RESPIRATION RATE: 18 BRPM | WEIGHT: 260.13 LBS | HEART RATE: 76 BPM | SYSTOLIC BLOOD PRESSURE: 132 MMHG | BODY MASS INDEX: 31.67 KG/M2 | OXYGEN SATURATION: 100 %

## 2022-09-20 DIAGNOSIS — I25.10 CORONARY ARTERY DISEASE, UNSPECIFIED VESSEL OR LESION TYPE, UNSPECIFIED WHETHER ANGINA PRESENT, UNSPECIFIED WHETHER NATIVE OR TRANSPLANTED HEART: ICD-10-CM

## 2022-09-20 DIAGNOSIS — Z98.84 HISTORY OF BARIATRIC SURGERY: ICD-10-CM

## 2022-09-20 DIAGNOSIS — E66.9 OBESITY (BMI 30.0-34.9): ICD-10-CM

## 2022-09-20 DIAGNOSIS — K91.2 POSTSURGICAL NONABSORPTION: ICD-10-CM

## 2022-09-20 DIAGNOSIS — F41.9 ANXIETY AND DEPRESSION: ICD-10-CM

## 2022-09-20 DIAGNOSIS — Z83.3 FAMILY HISTORY OF DIABETES MELLITUS (DM): ICD-10-CM

## 2022-09-20 DIAGNOSIS — I10 ESSENTIAL (PRIMARY) HYPERTENSION: ICD-10-CM

## 2022-09-20 DIAGNOSIS — I25.9 ISCHEMIC HEART DISEASE: ICD-10-CM

## 2022-09-20 DIAGNOSIS — F32.A ANXIETY AND DEPRESSION: ICD-10-CM

## 2022-09-20 DIAGNOSIS — Q87.418 MARFAN'S SYNDROME WITH OTHER CARDIOVASCULAR MANIFESTATIONS: ICD-10-CM

## 2022-09-20 DIAGNOSIS — R63.5 WEIGHT GAIN: Primary | ICD-10-CM

## 2022-09-20 PROCEDURE — 99215 PR OFFICE/OUTPT VISIT, EST, LEVL V, 40-54 MIN: ICD-10-PCS | Mod: S$PBB,,, | Performed by: FAMILY MEDICINE

## 2022-09-20 PROCEDURE — 99215 OFFICE O/P EST HI 40 MIN: CPT | Mod: S$PBB,,, | Performed by: FAMILY MEDICINE

## 2022-09-20 PROCEDURE — 99999 PR PBB SHADOW E&M-EST. PATIENT-LVL V: CPT | Mod: PBBFAC,,, | Performed by: FAMILY MEDICINE

## 2022-09-20 PROCEDURE — 99215 OFFICE O/P EST HI 40 MIN: CPT | Mod: PBBFAC | Performed by: FAMILY MEDICINE

## 2022-09-20 PROCEDURE — 99999 PR PBB SHADOW E&M-EST. PATIENT-LVL V: ICD-10-PCS | Mod: PBBFAC,,, | Performed by: FAMILY MEDICINE
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
## 2022-09-20 NOTE — PATIENT INSTRUCTIONS
"        PRODUCE  [] All fresh fruit   [] All fresh vegetables   [] All fresh herbs  [] All herb purees + pastes  [] Pre-spiralized vegetable noodles   [] Steam-In-The-Bag begetables  [] Riced cauliflower  [] Jicama sticks  [] Love Beets  all varieties  [] Wholly Guacamole  all varieties  [] Hummus  all varieties, chickpea + vegetable  [] Tofu Shirataki noodles    [] Tofu  all varieties  [] Tempeh  all varieties    PROTEIN  CHICKEN   [] Boneless, skinless breasts  [] Boneless, skinless thighs  [] Ground chicken breast, at least 93% lean  [] Chicken breast cutlet  [] Aidell's  Chicken Sausage + Chicken Meatballs    TURKEY   [] Turkey breast tenderloin   [] Ground turkey breast, at least 93% lean  [] [place] Naturals  Turkey Sausage    BEEF  [] Tenderloin  [] Sirloin  [] Top Loin  [] Flank Steak  [] Round Steak  [] Filet  [] Lean ground beef, at least 93% lean + grass-fed preferable    PORK  [] Tenderloin  [] Pork Chop  [] Center Cut  [] Linh Naturals  No-Sugar [place]    BISON  [] [place]  90 - 95% lean    SEAFOOD  [] All fresh fish + seafood; locally sourced when possible  [] Smoked salmon    HEAT + EAT ENTREES   [] David's Natural Foods  Chicken, Pork, Beef  [] Boris  "All Natural" Grilled Chicken Breast + Strips, all varieties    SAUCES SPREADS + DIPS  [] Bitchin Sauce  Original, Chipotle, Cilantro [place]  [] Kayce's Kitchen  Tzatziki Yogurt Dip, Babaganoush, Hummus  [] Wholly Guacamole  all varieties  [] Hummus  all varieties  [] Varney Gringo Salsa  all varieties  [] Mrs. Alisha's Salsa  all varieties  [] Stubb's All Natural BBQ Sauce  [] Primal Kitchen  Clarke, Ketchup, BBQ Sauce  [] Primal Kitchen Pasta Sauce  Roasted Garlic, Tomato Basil, no-dairy Vodka Sauce  [] Sal & Ursula's  HeartSmart Pasta Sauce    DAIRY/DAIRY SUBSTITUTES/EGGS  EGGS   [] All eggs  cage-free, pasture-raise preferable  [] Gordoni  egg wraps  [] Vital Farms  Pasture-Raised Egg Bites  [] JUST Egg [vegan] "     CREAMERS   [] Califia  Better Half, original + vanilla unsweetened  [] NutPods  all varieties    MILK   [] Horizon Organic  all varieties except chocolate  [] Organic Valley  all varieties except chocolate  [] Organic Valley  ultra-filtered, reduced fat milk     PLANT_BASED MILK ALTERNATIVES  [] All unsweetened almond milks  original, vanilla + chocolate  [] Ripple  unsweetened   [] Milkadamia  original +_ vanilla, unsweetened   [] Forager  original + vanilla, unsweetened   [] Silk Organic  soy milk, unsweetened  [] Oatly  unsweetened  [] Califia  regular + protein-fortified oat milk, unsweetened     CHEESES  [] Regular or reduced fat cheeses  [] BelGioso  Fresh Mozzarella Snack Packs, Parmesan Power-full Snack   [] Goat cheese  [] Fresh mozzarella  [] String cheese  all varieties  [] Juli Cottage Cheese  [] Diana's Cultured Cottage Cheese  [] Lizbeth Life 'Just Like Mozzarella'  plant-based shreds and other varieties  [] Parmela Creamery  plant-based shredded cheese    YOGURT  [] Fage  2% low-fat, plain  [] Siggi's  plain, vanilla  [] Chobani Greek  nonfat + whole milk yogurt, plain   [] Chobani Less Sugar  all flavored varieties   [] Oikos Greek  nonfat, plain  [] Two Good  all varieties   [] [facility] Provisions  plain  [] [place] Organic  low-fat + nonfat, plain  [] [facility]  goat milk yogurt, plain  [] Kefit  unsweetened, plain  [] Forager  Greek style unsweetened, plain [dairy-free]  [] [place]  unsweetened Greek style, plain [dairy-free]  [] [facility]  almond milk yogurt, vanilla or plain, unsweetened [dairy-free]    FREEZER SECTION  FROZEN VEGGIES  [] All plain frozen veggies + greens [e.g. broccoli, brussels, carrots, okra, mushrooms, zucchini, yellow squash, butternut squash, kale, spinach, yuni greens]  [] Riced veggies [e.g. cauliflower, broccoli, butternut squash]  [] Edamame  all varieties  [] Green Giant  [] Veggie Spirals  [] Marinated Veggies [e.g.  eggplant, peppers, zucchini]  [] Simply Steam [place] Sprouts  [] Birds Eye  [] Power Blend Italian Style  lentils, broccoli, kale, zucchini  []  [place] Sprouts & Carrots  [] Oven Roasters Broccoli & Cauliflower  [] [place] Blend  [] Tattooed   [] Green Bean Blend  [] Farmer's Market Ratatouille  [] Butter Balsamic Glazed Vegetables  [] Riced Cauliflower & Quinoa Mediterranean Style  [] Mima's Good Life  Southern Style Greens [sauteed kale + onion]    FROZEN FRUITS  [] All unsweetened frozen fruits  all varieties  [] Dole Fruits & Veggie Blends  Berries 'n Kale  [] Dole Mix-ins  Triple Berry     FROZEN ENTREES  [] The Good Kitchen meals  all varieties [ e.g. Chili Lime Chicken Over Riced Cauliflower]  [] Premium Paleo  Not Mikel Momma's Meatloaf  [] Primal Kitchen  Chicken Pesto + Steak Fajitas w/ Peppers & Onions  [] Eating Well Frozen Entrees  Butter Chicken Masala, Steak Carne Asada, Creamy Pesto Chicken, Chicken + Wild Rice Stroganoff, Yellow [place]ley Chicken, Sun-dried Tomato Chicken, Chicken Lo Mein  [] Realgood Entree Bowls  Turkish Inspired Beef Bowl over Riced Cauliflower, Chicken Burrito Bowl   [] Great Karma Coconut Bent[place]  [] Paula's  Tamale Regis with Black Beans, Vegetable Lasagna  [] Kashi Mayan [place] Bake  [] Healthy Choice  Simply Steamers Chicken Fried Rice  [] Basil Pesto Chicken & Devonte Style Pork Power Bowls  [] Tattooed   Enchilada Bowl  [] Vasquez Farms  Spicy Black Bean Burgers    FROZEN PIZZAS  [] Cauli'flour Foods  Cauliflower Pizza Crusts  [] Outer Aisle  Cauliflower Crust  [] Paula's  Veggie Crust Cheese Pizza  [] Quest Pizza     VEGETARIAN PRODUCTS  [] Beyond Meat  ground 'meat' + grilled 'chicken' strips  [] Tofurkey  Original Italian Sausage + Original Tempeh  [] Gardein  Beefless Ground + Meatless Meatballs  [] Vasquez Farms Grillers  Original Burger, Crumbles, Meatballs    ICE CREAMS + FROZEN DESSERTS  [] Halo Top  regular + keto  series, pops  [] Rebel  ice cream + dessert sandwiches  [] Enlightened  ice cream + bars  [] Nightfood  ice cream  [] Realgood  ice cream  [] Arctic Zero Fit  frozen pint  [] The Frozen Farmer  sorbets  [] Wholly Rollies  Protein Balls, all varieties  [] Dream Pops  Coconut Latte    FROZEN BREAKFASTS  [] Realgood  Breakfast Sandwiches on Cauliflower Cheesy Bread  [] Rebel  ice cream + dessert sandwiches  [] Enlightened  ice cream + bars  [] Nightfood  ice cream  [] Realgood  ice cream  [] Arctic Zero Fit  frozen pint  [] The Frozen Farmer  sorbets  [] Wholly Rollies  Protein Balls, all varieties  [] Dream Pops  Coconut Latte    BREADS/BUNS/WRAPS  [] Arnol Bread: All Types - In Freezer Section   [] Flat Out Light Wraps - All Varieties   [] Flat Out Protein Up Carb Down Flat Bread   [] Kontos Whole Wheat Pocket Melody   [] Joseph LISA 100% Whole Wheat Tortillas   [] LaTortilla Factory Tortillas - Smart & Delicious; 50 or 80-calorie   [] Nature's Own 100% Whole Wheat Bread   [] Orowheat Healthful - 100% Whole Wheat Slice Bread and [place] Thins   [] Orowheat Healthful - Whole Wheat Nuts & Grain Bread; Flax & Seed Bread   [] Pepperidge Farm Natural Whole Grain 15 Bread   [] Pepperidge Farm Natural Whole Grain English Muffin - 100% Whole Wheat   [] Pepperidge Farm Very Thin 100% Whole Wheat   [] Irma Lim 45 Calories and Delightful   [] Case' 100% Whole Wheat Thin-Sliced Bagels and English Muffins   [] Western Bagel: Perfect 10     GLUTEN FREE  [] Noble's Gluten Free Bread   [] [place] Bakehouse 7-Grain Gluten Free Bread     LEGUME PASTA   [] Explore Asian Organic Black Bean Spaghetti   [] Modern Table   [] Tolerant Foods       NUT BUTTERS & JELLIES    NUT BUTTERS   [] Better'n Chocolate: Coconut Chocolate Peanut Butter Spread   [] Better'n Peanut Butter - All Types   [] Earth Balance Coconut and Peanut Spread   [] Jorge Alberto's Nut [place]   [] MaraNatha: All Natural Roasted Cashew Butter - Aldie or Creamy    [] MaraNatha: Roasted Peanut Butter   [] Nuts 'N More Peanut [place] - All Flavors   [] PB2 Powder - Original or Chocolate   [] Skippy Natural - Creamy, Super Chunk   [] Smart Balance Peanut Butter - [place] or Creamy   [] Peanut Butter & Company:   [] Smooth , Crunch Time, The Heat Is On, Old Fashioned Smooth, Mighty Nut- Powdered Peanut Butter, Squeeze Pack   [] Smucker's Natural Peanut Butter - [place] or Creamy   [] Sunbutter Nut Butter   [] Wild Friends Protein Peanut Butter/[place] o Butter - Vanilla or Chocolate     JELLIES  o Polaner's All Fruit   o Clearly Organic Best Choice: Strawberry Fruit Spread       SNACKS    BARS  [] Kashi Bars - Chewy or Crunchy; Honey [place] o Flax or Peanut Butter   [] KIND Bars - 5 Grams of Sugar or Less   [] KIND Protein Bars - Strong and KIND   [] [facility] Protein Bar - All Varieties   [] Nature Valley Roasted Nut Crunch - [place] Crunch; Peanut Crunch   [] [facility] Simple Nut Bar - Roasted Peanut & Honey   [] [facility] Simple Nut Bar - [place], Cashew & Sea Salt   [] [facility] Nut [place] Bar - Salted Caramel Peanut   [] Think Thin Protein Bars   [] Quest Bars, Power Crunch Bars, Pure Protein Bars     BEEF JERKY - NITRATE FREE   [] Game On   [] Grass Run Farms   [] Krave   [] Ostrim   [] Perky Jerky   [] Primal Strips Meatless Vegan Jerky   [] [place]     CHIPS   [] Beanitos Chips   [] Fruit Crisps - e.g. Brother's-All-Natural, Bare Fruit, Yoga Chips   [] Dorothy's Soy Crisps: 1.3 ounce bag   [] Quest Protein Chips   [] Wasa Whole Wheat Crisp Bread     CRACKERS  [] Hannah's Gone Crackers   [] Nabisco Triscuit: Regular and Thin Crisp Crackers   [] Vans Say Cheese Crackers (G-F)     POPCORN/NUTS   [] Omid Jimenez's Smart Pop Popcorn - Single Serving   [] 100-Calorie Pack of Nuts - All Varieties     PROTEIN POWDERS & DRINKS  []  Protein -  Whey Protein Powder   [] Garden of Life Raw Protein Powder   [] Iconic Ready-To-Drink Protein Drink    [] Carrillo One Protein Powder   [] VegaSport Protein Powder     SALSA/HUMMUS/DIPS   [] Eat Well Embrace Life: Zesty Sriracha Carrot o Hummus   [] Pre-Portioned Guacamole Packs   [] Doss's   [] Tostitos Restaurant Style Salsa       SOUPS   [] Paula's Organic Soups - Lentil, Vegetable, Split Pea, Low-Sodium     CANNED GOODS   [] 100% Pure Pumpkin   [] BlueRunner Creole Cream-Style Red Beans or Navy Beans   [] Cajun Power Chicken Gumbo Base   [] Chicken of the [place]   [] Rick Fresh Cut Sliced Beets   [] Hormel Breast of Chicken in Water   [] LeSuer Tender Baby Whole Carrots   [] Chelsie Tabasco [place] Starter   [] StarKist: Chunk Lite Tuna in Water, Gourmet Select Pouches   [] StarKist: Yellow[place] Tuna Fillets   [] Trappey's: Kidney, Butter, Gomez, Black Eye, Field, and Black Beans   [] CONI Horowitz's Turnip Greens or [place] Spinach     CONDIMENTS/ SAUCES/SPREADS/ SPICES  [] Shaquille Barnett's Magic Seasonings - Regular or Salt Free   [] Dalila Meng's Sauces - All Flavors   [] Laughing Cow Light - All Flavors   [] Dash Salt-Free Marinade - All Flavors   [] Masood & Ursula's: Heart Smart Pasta Sauces   [] Tabasco     SALAD DRESSINGS  [] Susan's Naturals: Lite Honey Mustard   [] Edgar's Own: Lighten Up Salad Dressing - All Varieties   [] OPA Greek Yogurt Dressings - Ranch, Blue o Cheese, Caesar, Feta Dill     SWEETENERS  [] Sweet Bier Sweetener   [] Swerve   [] Truvia     BEVERAGES  [] Coconut Water   [] Crystal Light PURE - All Flavors   [] Honest Tea: Just Green Tea, Unsweetened   [] Kombucha Tea   [] [place]   [] [facility] Bloody Hannah Mix   [] Metromint - Zero-Sugar; All Natural Flavored   [] Solitario - Plain or Flavored   [] Lashonda Kelly   [] Steaz - Zero-Sugar, All-Natural, Sparkling Tea   [] Tea Bags: Any Brand - e.g. Spike, Yogi, Tazzo, Celestial   [] V8 100% Vegetable Juice   [] Vitamin Water Zero   [] Water   [] Zevia - Stevia Sweetened Soft Drink     BEER/BENJY/LIQUORS  []Sinan's Premier  Light 64 Calories   [] Bud Select - 55 Calories   [] [facility] Sisters Bloody Hannah Mix   [] Love Genuine Draft - 64 Calories   [] Red or White Wine - All Varieties     CEREALS: HOT/COLD   [] Emerald's Puffin's Original Cereal  [] Mary's Mill Oat Bran Hot Cereal - Cracked Wheat, Multi-Grain  [] Kashi GoLean Cereal  [] Kashi GoLean Hot Cereal packets - Vanilla; Honey Cinnamon  [] Jarvis's Special K Protein Cereal  [] Olivier's Steel Cut Jess Oatmeal  [] Nature's Path Smart Bran  [] Lutheran Instant Oatmeal packet, Original  [] Lutheran Old Fashioned Lutheran Oats  []  Pietro's Whole Wheat & Flaxseed Original Cereal

## 2022-09-22 ENCOUNTER — LAB VISIT (OUTPATIENT)
Dept: LAB | Facility: HOSPITAL | Age: 47
End: 2022-09-22
Attending: FAMILY MEDICINE
Payer: MEDICARE

## 2022-09-22 DIAGNOSIS — I10 ESSENTIAL (PRIMARY) HYPERTENSION: ICD-10-CM

## 2022-09-22 DIAGNOSIS — E66.9 OBESITY (BMI 30.0-34.9): ICD-10-CM

## 2022-09-22 DIAGNOSIS — K91.2 POSTSURGICAL NONABSORPTION: ICD-10-CM

## 2022-09-22 DIAGNOSIS — I25.10 CORONARY ARTERY DISEASE, UNSPECIFIED VESSEL OR LESION TYPE, UNSPECIFIED WHETHER ANGINA PRESENT, UNSPECIFIED WHETHER NATIVE OR TRANSPLANTED HEART: ICD-10-CM

## 2022-09-22 DIAGNOSIS — Z83.3 FAMILY HISTORY OF DIABETES MELLITUS (DM): ICD-10-CM

## 2022-09-22 DIAGNOSIS — R63.5 WEIGHT GAIN: ICD-10-CM

## 2022-09-22 LAB
ESTIMATED AVG GLUCOSE: 126 MG/DL (ref 68–131)
HBA1C MFR BLD: 6 % (ref 4–5.6)
INSULIN COLLECTION INTERVAL: 0
INSULIN SERPL-ACNC: 5.2 UU/ML

## 2022-09-22 PROCEDURE — 36415 COLL VENOUS BLD VENIPUNCTURE: CPT | Mod: PO | Performed by: FAMILY MEDICINE

## 2022-09-22 PROCEDURE — 83036 HEMOGLOBIN GLYCOSYLATED A1C: CPT | Performed by: FAMILY MEDICINE

## 2022-09-22 PROCEDURE — 83525 ASSAY OF INSULIN: CPT | Performed by: FAMILY MEDICINE

## 2022-10-04 ENCOUNTER — OFFICE VISIT (OUTPATIENT)
Dept: GASTROENTEROLOGY | Facility: CLINIC | Age: 47
End: 2022-10-04
Payer: MEDICARE

## 2022-10-04 VITALS
SYSTOLIC BLOOD PRESSURE: 130 MMHG | BODY MASS INDEX: 34.84 KG/M2 | DIASTOLIC BLOOD PRESSURE: 80 MMHG | WEIGHT: 257.25 LBS | HEIGHT: 72 IN | HEART RATE: 72 BPM

## 2022-10-04 DIAGNOSIS — Z12.11 COLON CANCER SCREENING: ICD-10-CM

## 2022-10-04 PROCEDURE — 99214 OFFICE O/P EST MOD 30 MIN: CPT | Mod: PBBFAC,PO | Performed by: INTERNAL MEDICINE

## 2022-10-04 PROCEDURE — 99999 PR PBB SHADOW E&M-EST. PATIENT-LVL IV: CPT | Mod: PBBFAC,,, | Performed by: INTERNAL MEDICINE

## 2022-10-04 PROCEDURE — 99214 OFFICE O/P EST MOD 30 MIN: CPT | Mod: S$PBB,,, | Performed by: INTERNAL MEDICINE

## 2022-10-04 PROCEDURE — 99214 PR OFFICE/OUTPT VISIT, EST, LEVL IV, 30-39 MIN: ICD-10-PCS | Mod: S$PBB,,, | Performed by: INTERNAL MEDICINE

## 2022-10-04 PROCEDURE — 99999 PR PBB SHADOW E&M-EST. PATIENT-LVL IV: ICD-10-PCS | Mod: PBBFAC,,, | Performed by: INTERNAL MEDICINE

## 2022-10-04 NOTE — PROGRESS NOTES
Clinic Progress Note:  [facility] Progress Note    Reason for Visit:  The encounter diagnosis was Colon cancer screening.    PCP: Scott Escobar       HPI:    This is a 47 y.o. female here for evaluation of SIBO.   She was diagnosed with SIBO.   Prescribed Neomycin and Rifaximin.   She completed Neomycin and Flagyl.   She was unable to fill Rifaximin due to price.  She completed another course of Neomycin and Augmentin.  She is following low FODMAP diet.   She is still experiencing abdominal bloating.   She is now keeping a food diary.   She had a colonoscopy several years ago for abdominal pain.   Never had a screening colonoscopy.   No family history of colon cancer.        ROS:  AS per HPI        Allergies: Reviewed    Home Medications:   Medication List with Changes/Refills   Current Medications    BUMETANIDE (BUMEX) 1 MG TABLET    Take 1 tablet (1 mg total) by mouth once daily.    CALCIUM CARBONATE (OS-ESE) 600 MG CALCIUM (1,500 MG) TAB    Take 600 mg by mouth.    CYANOCOBALAMIN 1,000 MCG/ML INJECTION    Inject 1 mL (1,000 mcg total) into the muscle every 30 days.    ERGOCALCIFEROL, VITAMIN D2, (VITAMIN D ORAL)        FERROUS SULFATE (FEOSOL) 325 MG (65 MG IRON) TAB TABLET    Take 325 mg by mouth.    GABAPENTIN (NEURONTIN) 600 MG TABLET    Take 1 tablet (600 mg total) by mouth 3 (three) times daily.    IRON,CARB/VIT C/VIT B12/FOLIC (IRON 100 PLUS ORAL)        POTASSIUM CHLORIDE (KLOR-CON) 20 MEQ PACK    Take 40 mEq by mouth once daily.    PROMETHAZINE (PHENERGAN) 25 MG SUPPOSITORY    Place 25 mg rectally daily as needed.    SPIRONOLACTONE (ALDACTONE) 25 MG TABLET    Take 1 tablet (25 mg total) by mouth once daily.    TIZANIDINE (ZANAFLEX) 4 MG TABLET    Take 2 tablets (8 mg total) by mouth 3 (three) times daily.    TRAZODONE (DESYREL) 150 MG TABLET    Take 1 tablet (150 mg total) by mouth every evening.    VENLAFAXINE (EFFEXOR-XR) 37.5 MG 24 HR CAPSULE    Take 1 capsule (37.5 mg total) by  "mouth once daily.         Physical Exam:  Vital Signs:  /80 (BP Location: Right arm, Patient Position: Sitting, BP Method: Large (Manual))   Pulse 72   Ht 6' 4" (1.93 m)   Wt 116.7 kg (257 lb 4.4 oz)   BMI 31.32 kg/m²   Body mass index is 31.32 kg/m².    Physical Exam  Constitutional:       Appearance: Normal appearance.   HENT:      Head: Normocephalic.   Eyes:      Conjunctiva/sclera: Conjunctivae normal.   Abdominal:      General: There is no distension.      Palpations: Abdomen is soft.      Tenderness: There is no abdominal tenderness. There is no guarding.   Neurological:      Mental Status: She is alert.        Labs: Pertinent labs reviewed.        Assessment:      Problem List Items Addressed This Visit      Colon cancer screening:     -Will schedule a colonoscopy at this time   -She will need cardiac clearance.                GI       Abdominal bloating       Current Assessment & Plan         -Diagnosed with SIBO      Plan:   -s/p Neomycin/Flagyl and Neomycin/Augmentin but she continues to have symptoms   -Cannot afford Rifaximin    -Continue low FODMAP diet   -Gas x as needed   -Food diary              Follow-up as needed     Order summary:  Orders Placed This Encounter   Procedures    Ambulatory referral/consult to Endo Procedure        Thank you so much for allowing me to participate in the care of Viri Chavez MD  Gastroenterology and Hepatology  [facility]       "

## 2022-10-06 ENCOUNTER — HOSPITAL ENCOUNTER (OUTPATIENT)
Dept: PREADMISSION TESTING | Facility: HOSPITAL | Age: 47
Discharge: HOME | End: 2022-10-06
Attending: INTERNAL MEDICINE
Payer: MEDICARE

## 2022-10-06 DIAGNOSIS — Z12.11 COLON CANCER SCREENING: Primary | ICD-10-CM

## 2022-10-06 RX ORDER — POLYETHYLENE GLYCOL 3350, SODIUM SULFATE ANHYDROUS, SODIUM BICARBONATE, SODIUM CHLORIDE, POTASSIUM CHLORIDE 236; 22.74; 6.74; 5.86; 2.97 G/4L; G/4L; G/4L; G/4L; G/4L
4 POWDER, FOR SOLUTION ORAL ONCE
Qty: 4000 ML | Refills: 0 | Status: SHIPPED | OUTPATIENT
Start: 2022-10-06 | End: 2022-10-06

## 2022-10-20 ENCOUNTER — OFFICE VISIT (OUTPATIENT)
Dept: PRIMARY CARE CLINIC | Facility: CLINIC | Age: 47
End: 2022-10-20
Payer: MEDICARE

## 2022-10-20 DIAGNOSIS — K21.9 GASTROESOPHAGEAL REFLUX DISEASE, UNSPECIFIED WHETHER ESOPHAGITIS PRESENT: ICD-10-CM

## 2022-10-20 DIAGNOSIS — R73.03 PRE-DIABETES: Primary | ICD-10-CM

## 2022-10-20 DIAGNOSIS — Q87.418 MARFAN'S SYNDROME WITH OTHER CARDIOVASCULAR MANIFESTATIONS: ICD-10-CM

## 2022-10-20 DIAGNOSIS — F41.9 ANXIETY: ICD-10-CM

## 2022-10-20 DIAGNOSIS — E66.09 CLASS 1 OBESITY DUE TO EXCESS CALORIES WITH SERIOUS COMORBIDITY AND BODY MASS INDEX (BMI) OF 31.0 TO 31.9 IN ADULT: ICD-10-CM

## 2022-10-20 PROCEDURE — 99214 PR OFFICE/OUTPT VISIT, EST, LEVL IV, 30-39 MIN: ICD-10-PCS | Mod: 95,,, | Performed by: FAMILY MEDICINE

## 2022-10-20 PROCEDURE — 99214 OFFICE O/P EST MOD 30 MIN: CPT | Mod: 95,,, | Performed by: FAMILY MEDICINE

## 2022-10-20 NOTE — PROGRESS NOTES
Subjective:       Patient ID: Viri Bird is a 47 y.o. female.    Pmhx, fam hx, soc hx, surg hx, allergies, med list reviewed      The patient location is: car/parked/[place]  The chief complaint leading to consultation is: follow up    Visit type: audiovisual    Face to Face time with patient: 35+  38 minutes of total time spent on the encounter, which includes face to face time and non-face to face time preparing to see the patient (eg, review of tests), Obtaining and/or reviewing separately obtained history, Documenting clinical information in the electronic or other health record, Independently interpreting results (not separately reported) and communicating results to the patient/family/caregiver, or Care coordination (not separately reported).         Each patient to whom he or she provides medical services by telemedicine is:  (1) informed of the relationship between the physician and patient and the respective role of any other health care provider with respect to management of the patient; and (2) notified that he or she may decline to receive medical services by telemedicine and may withdraw from such care at any time.    Notes:    Chief Complaint: follow up  Pt f/u for lifestyle changes. Pre diabetic per labs.   Pt not a candidate for many medications. Needed to work on simple carb reduction. Did not send food log but has been doing for her own benefit.    Pt did see gi; completed partial treatment     Pt reports lost 5 lb since last visit; has cut some simple carbs. Has cscope scheduled. She is eating less carbs (limiting toast and potatoes).  Does not want metformin.    She has increased steps/walking. Sleep is okay overall. Pt still working on finding community. She does have some visits with SILs. Has gotten out of house more.      Getting plenty of water.     Measuring dairy. Coke limited to 6 oz. Counting starches.   Snacks: smart pop instead of potato chips. Occasional snap chips. Has been staying  full. Sometimes has gas even with water. Has not audie able to commit to low FODMAP but has decreased simple sugars. Working on taking potatoes out of diet.     She does observe sleep/anxiety have been improving. Feels effexor is helping and plans to stay on medication. Still some early am awakenings but overall improved depending on when she takes her medication. Denies crisis.  Has cut back on corn. Eating eggs instead of grits.     Has tried vegan cheese; did not like. Putting garlic seasoning. Using avocados.     Reviewed with pt low fodmap veggies: does like green beans, carrots    HPI  Review of Systems   Constitutional:  Negative for activity change, appetite change, fatigue and fever.   HENT:  Negative for mouth dryness.    Eyes:  Negative for visual disturbance.   Respiratory:  Negative for apnea, cough, chest tightness and shortness of breath.    Cardiovascular:  Negative for chest pain, palpitations and leg swelling.   Gastrointestinal:  Negative for abdominal pain, constipation and diarrhea.   Endocrine: Negative for cold intolerance, heat intolerance, polydipsia, polyphagia and polyuria.   Genitourinary:  Negative for frequency.   Musculoskeletal:  Negative for arthralgias and myalgias.   Integumentary:  Negative for color change and rash.   Psychiatric/Behavioral:  Negative for sleep disturbance. The patient is not nervous/anxious.        Objective:      Physical Exam  Constitutional:       General: She is not in acute distress.     Appearance: Normal appearance.   HENT:      Head: Normocephalic and atraumatic.   Eyes:      General: No scleral icterus.  Pulmonary:      Effort: Pulmonary effort is normal. No respiratory distress.   Neurological:      Mental Status: She is alert and oriented to person, place, and time.   Psychiatric:         Mood and Affect: Mood normal.         Behavior: Behavior normal.       Assessment:       1. Pre-diabetes    2. Gastroesophageal reflux disease, unspecified whether  esophagitis present    3. Marfan's syndrome with other cardiovascular manifestations    4. Anxiety     5. Class 1 obesity      Plan:       Pre-diabetes  Comments:  continued efforts lifestyle interventions  continue to cut back on simple sugars/carbs  Pt does not feel can do a low FODMAP diet fully    Gastroesophageal reflux disease, unspecified whether esophagitis present  Comments:  per GI    Marfan's syndrome with other cardiovascular manifestations  Chronic/stable  Anxiety  Stable; continue effexor and lifestyle intervention      Class 1 obesity  Pt asked again to send food diary.  Ok to add strawberries, green bean, cabbage, broccoli, peanuts, walnuts, sunflower seeds, feta cheese, oranges (moderation)  (Meet low fodmap requirements as well as lower GI)  Pt has begun to incorporate some med. Diet principles  Pt congratulated on efforts. Continued efforts dw pt--has in stepwise fashion  decreased amount.     As soon as has cscope, have dw pt will need to consider medication  Pt to r/s follow up after this  35 min + spent with pt counseling specific food choices; nutrition and lifestyle counseling

## 2022-10-27 ENCOUNTER — TELEPHONE (OUTPATIENT)
Dept: PREADMISSION TESTING | Facility: HOSPITAL | Age: 47
End: 2022-10-27
Payer: MEDICARE

## 2022-10-27 NOTE — TELEPHONE ENCOUNTER
This patient is being scheduled for one of the following procedures: Colonoscopy    Please indicate if the patient is cleared for surgery from a cardiac standpoint.    Patient gave verbal consent for e-consult Yes    Cardiac PMHx: Coronary artery disease  Essential (primary) hypertension   Venous insufficiency  Marfan's syndrome with other cardiovascular manifestations      Last Echo: Results for orders placed during the hospital encounter of 03/24/22    Echo    Interpretation Summary  · The left ventricle is normal in size with normal systolic function.  · The estimated ejection fraction is 65%.  · Mild left atrial enlargement.  · Normal right ventricular size with normal right ventricular systolic function.  · Mild mitral regurgitation.  · Mild to moderate tricuspid regurgitation.  · Normal central venous pressure (3 mmHg).  · The estimated PA systolic pressure is 36 mmHg.      Anticoagulants: ls anticoag list: none    If you have questions or concerns, please call us at [phone].

## 2022-10-29 ENCOUNTER — E-CONSULT (OUTPATIENT)
Dept: CARDIOLOGY | Facility: CLINIC | Age: 47
End: 2022-10-29
Payer: MEDICARE

## 2022-10-29 DIAGNOSIS — K21.9 GASTROESOPHAGEAL REFLUX DISEASE, UNSPECIFIED WHETHER ESOPHAGITIS PRESENT: ICD-10-CM

## 2022-10-29 DIAGNOSIS — I25.10 CORONARY ARTERY DISEASE, UNSPECIFIED VESSEL OR LESION TYPE, UNSPECIFIED WHETHER ANGINA PRESENT, UNSPECIFIED WHETHER NATIVE OR TRANSPLANTED HEART: Primary | ICD-10-CM

## 2022-10-29 DIAGNOSIS — G47.33 OBSTRUCTIVE SLEEP APNEA: ICD-10-CM

## 2022-10-29 DIAGNOSIS — E66.09 CLASS 1 OBESITY DUE TO EXCESS CALORIES WITH SERIOUS COMORBIDITY AND BODY MASS INDEX (BMI) OF 31.0 TO 31.9 IN ADULT: ICD-10-CM

## 2022-10-29 DIAGNOSIS — I10 ESSENTIAL (PRIMARY) HYPERTENSION: ICD-10-CM

## 2022-10-29 PROCEDURE — 99446 NTRPROF PH1/NTRNET/EHR 5-10: CPT | Mod: ,,, | Performed by: INTERNAL MEDICINE

## 2022-10-29 PROCEDURE — 99446 PR INTERPROF, PHONE/INTERNET/EHR, CONSULT, 5-10 MINS: ICD-10-PCS | Mod: ,,, | Performed by: INTERNAL MEDICINE

## 2022-10-29 NOTE — PROGRESS NOTES
[facility]  Response for E-Consult     Patient Name: Viri Bird  MRN: [id number]  Primary Care Provider: Scott Escobar MD   Requesting Provider: Caryl Worley L*      Findings: 48 yo F with a h/o Marfan's/SCAD '08 s/p CABGx1 to RCA, hypertension, L CIV/EIV stenting for May-Thurner '11 gastric bypass, RUL lung resection referred for Cv clearance for C scope. Pt last seen in CV clinic 3-22. CV status stable. If no further symptoms, pt cleared for procedure/surgery at moderate CV risk     I did not speak to the requesting provider verbally about this.     Total time of Consultation: 5 minute    Percentage of time spent on verbal/written discussion: 25%     Thank you for your consult.     Bert Mistry MD  [facility]

## 2022-11-17 ENCOUNTER — OFFICE VISIT (OUTPATIENT)
Dept: PULMONOLOGY | Facility: CLINIC | Age: 47
End: 2022-11-17
Payer: MEDICARE

## 2022-11-17 VITALS
RESPIRATION RATE: 16 BRPM | HEART RATE: 81 BPM | WEIGHT: 254.88 LBS | BODY MASS INDEX: 34.52 KG/M2 | OXYGEN SATURATION: 97 % | DIASTOLIC BLOOD PRESSURE: 82 MMHG | SYSTOLIC BLOOD PRESSURE: 108 MMHG | HEIGHT: 72 IN

## 2022-11-17 DIAGNOSIS — Z90.2 HISTORY OF PNEUMONECTOMY: ICD-10-CM

## 2022-11-17 DIAGNOSIS — Z98.890 HISTORY OF PNEUMONECTOMY: ICD-10-CM

## 2022-11-17 DIAGNOSIS — G47.00 INSOMNIA, UNSPECIFIED TYPE: ICD-10-CM

## 2022-11-17 DIAGNOSIS — G47.33 OBSTRUCTIVE SLEEP APNEA: ICD-10-CM

## 2022-11-17 DIAGNOSIS — G47.33 OSA (OBSTRUCTIVE SLEEP APNEA): Primary | ICD-10-CM

## 2022-11-17 DIAGNOSIS — G47.9 SLEEP DIFFICULTIES: ICD-10-CM

## 2022-11-17 PROCEDURE — 99214 OFFICE O/P EST MOD 30 MIN: CPT | Mod: PBBFAC,PO | Performed by: NURSE PRACTITIONER

## 2022-11-17 PROCEDURE — 99999 PR PBB SHADOW E&M-EST. PATIENT-LVL IV: CPT | Mod: PBBFAC,,, | Performed by: NURSE PRACTITIONER

## 2022-11-17 PROCEDURE — 99204 OFFICE O/P NEW MOD 45 MIN: CPT | Mod: S$PBB,,, | Performed by: NURSE PRACTITIONER

## 2022-11-17 PROCEDURE — 99999 PR PBB SHADOW E&M-EST. PATIENT-LVL IV: ICD-10-PCS | Mod: PBBFAC,,, | Performed by: NURSE PRACTITIONER

## 2022-11-17 PROCEDURE — 99204 PR OFFICE/OUTPT VISIT, NEW, LEVL IV, 45-59 MIN: ICD-10-PCS | Mod: S$PBB,,, | Performed by: NURSE PRACTITIONER

## 2022-11-17 NOTE — PROGRESS NOTES
Subjective:      Patient ID: Viri Bird is a 47 y.o. female.    Chief Complaint: Apnea    HPI    Patient presents to the office today for evaluation of sleep apnea.  Patient with snoring and witnessed apneas. She has insomnia due to anxiety. Improved on Trazodone. Patient does not wake up feeling refreshed in the morning.  Patient with daytime hypersomnolence.  [place] Sleepiness Scale score 4.  She was originally dx with JEANNIE 2014. Her CPAP is broken. Comorbidities include CAD s/p CABG. Hx Marfan's. Hx of pneumonectomy-due to histoplasmosis.  Bedtime: 10PM  Wake time: 6AM    STOP - BANG Questionnaire:     1. Snoring : Do you snore loudly ?    Yes    2. Tired : Do you often feel tired, fatigued, or sleepy during daytime?   Yes    3. Observed: Has anyone observed you stop breathing during your sleep?   Yes    4. Blood pressure : Do you have or are you being treated for high blood pressure?   Yes    5. BMI :BMI more than 35 kg/m2?   No    6. Age : Age over 50 yr old?   No    7. Neck circumference: Neck circumference greater than 40 cm?   No    8. Gender: Gender male?   No  High risk of JEANNIE: Yes 5 - 8  Intermediate risk of JEANNIE: Yes 3 - 4  Low risk of JEANNIE: Yes 0 - 2      References:   STOP Questionnaire   A Tool to Screen Patients for Obstructive Sleep Apnea: BANDAR Plummer.C.P.C., KIMMY Lucas.B.B.S., Brayan Leiva M.D.,Radha Reyes, Ph.D., Corey Hawley M.B.B.S.,_ KIMMY Martinez.Sc.,_ Collette Granett M.D., lEiceo Beonit F.R.C.P.C.; Anesthesiology 2008; 108:812-21 Copyright © 2008, the American Society of Anesthesiologists, Inc. [employer]    Patient Active Problem List   Diagnosis    Coronary artery disease    Essential (primary) hypertension     Marfan's syndrome with other cardiovascular manifestations    Venous insufficiency    Abdominal bloating    Colon cancer screening    Anxiety    GERD (gastroesophageal reflux disease)    History of pneumonectomy     "Obstructive sleep apnea    Pre-diabetes    Class 1 obesity due to excess calories with body mass index (BMI) of 31.0 to 31.9 in adult         /82   Pulse 81   Resp 16   Ht 6' 4" (1.93 m)   Wt 115.6 kg (254 lb 13.6 oz)   SpO2 97%   BMI 31.02 kg/m²   Body mass index is 31.02 kg/m².    Review of Systems   Respiratory:  Positive for snoring.    Psychiatric/Behavioral:  Positive for sleep disturbance. The patient is nervous/anxious.        Objective:      Physical Exam  Constitutional:       Appearance: Normal appearance. She is well-developed.   HENT:      Head: Normocephalic and atraumatic.      Nose: Nose normal.      Mouth/Throat:      Pharynx: Oropharynx is clear.   Cardiovascular:      Rate and Rhythm: Normal rate and regular rhythm.      Heart sounds: No murmur heard.    No gallop.   Pulmonary:      Effort: Pulmonary effort is normal.      Breath sounds: Normal breath sounds.   Abdominal:      Palpations: Abdomen is soft. There is no mass.      Tenderness: There is no abdominal tenderness.   Musculoskeletal:         General: Normal range of motion.      Cervical back: Normal range of motion and neck supple.   Skin:     General: Skin is warm and dry.   Neurological:      Mental Status: She is alert and oriented to person, place, and time.   Psychiatric:         Mood and Affect: Mood normal.         Behavior: Behavior normal.     Personal Diagnostic Review        Results for orders placed during the hospital encounter of 03/15/22    X-Ray Chest PA And Lateral    Narrative  EXAMINATION:  XR CHEST PA AND LATERAL    CLINICAL HISTORY:  Atherosclerotic heart disease of native coronary artery without angina pectoris    TECHNIQUE:  PA and lateral views of the chest were performed.    COMPARISON:  None    FINDINGS:  Cardiac silhouette is normal in size.  Prior sternotomy noted.  There is a left subclavian MediPort catheter in place with the distal tip of the catheter tubing projecting at the level of the mid " SVC.  Postsurgical changes are noted in the right lung base.  Lungs are otherwise clear bilaterally.  No acute osseous findings demonstrated.    Impression  As above.  No acute findings.      Electronically signed by: Mitch Causey MD  Date:    03/15/2022  Time:    08:02        Assessment:     1. JEANNIE (obstructive sleep apnea)    2. Sleep difficulties    3. Insomnia, unspecified type    4. History of pneumonectomy    5. Obstructive sleep apnea       Outpatient Encounter Medications as of 11/17/2022   Medication Sig Dispense Refill    bumetanide (BUMEX) 1 MG tablet Take 1 tablet (1 mg total) by mouth once daily. 90 tablet 3    calcium carbonate (OS-ESE) 600 mg calcium (1,500 mg) Tab Take 600 mg by mouth.      cyanocobalamin 1,000 mcg/mL injection Inject 1 mL (1,000 mcg total) into the muscle every 30 days. 3 mL 3    ergocalciferol, vitamin D2, (VITAMIN D ORAL)       ferrous sulfate (FEOSOL) 325 mg (65 mg iron) Tab tablet Take 325 mg by mouth.      gabapentin (NEURONTIN) 600 MG tablet Take 1 tablet (600 mg total) by mouth 3 (three) times daily. 270 tablet 3    iron,carb/vit C/vit B12/folic (IRON 100 PLUS ORAL)       potassium chloride (KLOR-CON) 20 mEq Pack Take 40 mEq by mouth once daily. 90 packet 3    promethazine (PHENERGAN) 25 MG suppository Place 25 mg rectally daily as needed.      spironolactone (ALDACTONE) 25 MG tablet Take 1 tablet (25 mg total) by mouth once daily. 90 tablet 3    tiZANidine (ZANAFLEX) 4 MG tablet Take 2 tablets (8 mg total) by mouth 3 (three) times daily. 540 tablet 3    traZODone (DESYREL) 150 MG tablet Take 1 tablet (150 mg total) by mouth every evening. 90 tablet 3    venlafaxine (EFFEXOR-XR) 37.5 MG 24 hr capsule Take 1 capsule (37.5 mg total) by mouth once daily. 90 capsule 3     No facility-administered encounter medications on file as of 11/17/2022.     Orders Placed This Encounter   Procedures    Polysomnogram (CPAP will be added if patient meets diagnostic criteria.)     Standing  Status:   Future     Standing Expiration Date:   11/17/2023     Plan:   Reevaluate JEANNIE-polysomnogram.   Continue Trazodone.   Sleep scheduling and stimulus control.    Problem List Items Addressed This Visit          Pulmonary    History of pneumonectomy    Current Assessment & Plan     Histoplasmosis            Other    Obstructive sleep apnea     Other Visit Diagnoses       JEANNIE (obstructive sleep apnea)    -  Primary    Relevant Orders    Polysomnogram (CPAP will be added if patient meets diagnostic criteria.)    Sleep difficulties        Relevant Orders    Polysomnogram (CPAP will be added if patient meets diagnostic criteria.)    Insomnia, unspecified type        Relevant Orders    Polysomnogram (CPAP will be added if patient meets diagnostic criteria.)

## 2022-12-02 ENCOUNTER — PATIENT MESSAGE (OUTPATIENT)
Dept: ADMINISTRATIVE | Facility: OTHER | Age: 47
End: 2022-12-02
Payer: MEDICARE

## 2022-12-03 ENCOUNTER — PATIENT MESSAGE (OUTPATIENT)
Dept: ADMINISTRATIVE | Facility: OTHER | Age: 47
End: 2022-12-03
Payer: MEDICARE

## 2022-12-13 ENCOUNTER — ANESTHESIA EVENT (OUTPATIENT)
Dept: ENDOSCOPY | Facility: HOSPITAL | Age: 47
End: 2022-12-13
Payer: MEDICARE

## 2022-12-13 ENCOUNTER — ANESTHESIA (OUTPATIENT)
Dept: ENDOSCOPY | Facility: HOSPITAL | Age: 47
End: 2022-12-13
Payer: MEDICARE

## 2022-12-13 ENCOUNTER — HOSPITAL ENCOUNTER (OUTPATIENT)
Facility: HOSPITAL | Age: 47
Discharge: HOME | End: 2022-12-13
Attending: INTERNAL MEDICINE | Admitting: INTERNAL MEDICINE
Payer: MEDICARE

## 2022-12-13 DIAGNOSIS — Z12.11 COLON CANCER SCREENING: ICD-10-CM

## 2022-12-13 LAB
B-HCG UR QL: NEGATIVE
CTP QC/QA: YES

## 2022-12-13 PROCEDURE — 37000008 HC ANESTHESIA 1ST 15 MINUTES: Performed by: INTERNAL MEDICINE

## 2022-12-13 PROCEDURE — G0121 COLON CA SCRN NOT HI RSK IND: HCPCS | Mod: 74 | Performed by: INTERNAL MEDICINE

## 2022-12-13 PROCEDURE — G0121 COLON CA SCRN NOT HI RSK IND: ICD-10-PCS | Mod: 53,,, | Performed by: INTERNAL MEDICINE

## 2022-12-13 PROCEDURE — 81025 URINE PREGNANCY TEST: CPT | Performed by: INTERNAL MEDICINE

## 2022-12-13 PROCEDURE — G0121 COLON CA SCRN NOT HI RSK IND: HCPCS | Mod: 53,,, | Performed by: INTERNAL MEDICINE

## 2022-12-13 PROCEDURE — 63600175 PHARM REV CODE 636 W HCPCS: Performed by: NURSE ANESTHETIST, CERTIFIED REGISTERED

## 2022-12-13 PROCEDURE — 63600175 PHARM REV CODE 636 W HCPCS: Performed by: INTERNAL MEDICINE

## 2022-12-13 PROCEDURE — 25000003 PHARM REV CODE 250: Performed by: NURSE ANESTHETIST, CERTIFIED REGISTERED

## 2022-12-13 RX ORDER — PROPOFOL 10 MG/ML
VIAL (ML) INTRAVENOUS
Status: DISCONTINUED | OUTPATIENT
Start: 2022-12-13 | End: 2022-12-13

## 2022-12-13 RX ORDER — LIDOCAINE HYDROCHLORIDE 10 MG/ML
INJECTION, SOLUTION EPIDURAL; INFILTRATION; INTRACAUDAL; PERINEURAL
Status: DISCONTINUED | OUTPATIENT
Start: 2022-12-13 | End: 2022-12-13

## 2022-12-13 RX ORDER — HEPARIN 100 UNIT/ML
5 SYRINGE INTRAVENOUS ONCE
Status: COMPLETED | OUTPATIENT
Start: 2022-12-13 | End: 2022-12-13

## 2022-12-13 RX ADMIN — PROPOFOL 150 MG: 10 INJECTION, EMULSION INTRAVENOUS at 11:12

## 2022-12-13 RX ADMIN — SODIUM CHLORIDE, SODIUM LACTATE, POTASSIUM CHLORIDE, AND CALCIUM CHLORIDE: .6; .31; .03; .02 INJECTION, SOLUTION INTRAVENOUS at 10:12

## 2022-12-13 RX ADMIN — HEPARIN 500 UNITS: 100 SYRINGE at 11:12

## 2022-12-13 RX ADMIN — LIDOCAINE HYDROCHLORIDE 50 MG: 10 INJECTION, SOLUTION EPIDURAL; INFILTRATION; INTRACAUDAL; PERINEURAL at 11:12

## 2022-12-13 NOTE — ANESTHESIA PREPROCEDURE EVALUATION
12/13/2022  Viri Bird is a 47 y.o., female.     Findings: 46 yo F with a h/o Marfan's/SCAD '08 s/p CABGx1 to RCA, hypertension, L CIV/EIV stenting for May-Thurner '11 gastric bypass, RUL lung resection referred for Cv clearance for C scope. Pt last seen in CV clinic 3-22. CV status stable. If no further symptoms, pt cleared for procedure/surgery at moderate CV risk     Pre-op Assessment    I have reviewed the Patient Summary Reports.     I have reviewed the Nursing Notes. I have reviewed the NPO Status.      Review of Systems  Anesthesia Hx:  No problems with previous Anesthesia    Social:  Non-Smoker    Cardiovascular:   Hypertension CAD  CABG/stent (2008)  ECG has been reviewed.    Pulmonary:   Sleep Apnea    Education provided regarding risk of obstructive sleep apnea     Hepatic/GI:   Bowel Prep. GERD  Hepatic/GI Symptoms: abdominal pain.    Neurological:  Neurology Normal    Endocrine:   Diabetes (Summary    ? The left ventricle is normal in size with normal systolic function.  ? The estimated ejection fraction is 65%.  ? Mild left atrial enlargement.  ? Normal right ventricular size with normal right ventricular systolic function.  ? Mild mitral re), type 2, using insulin    Psych:   anxiety depression          Physical Exam  General: Well nourished and Cooperative    Airway:  Mallampati: II   Mouth Opening: Normal  Tongue: Normal    Dental:  Intact, Dentures  Upper dentures  Chest/Lungs:  Clear to auscultation, Normal Respiratory Rate    Heart:  Rate: Normal  Rhythm: Regular Rhythm        Anesthesia Plan  Type of Anesthesia, risks & benefits discussed:    Anesthesia Type: MAC  Intra-op Monitoring Plan: Standard ASA Monitors  Post Op Pain Control Plan: multimodal analgesia  Induction:  IV  Informed Consent: Informed consent signed with the Patient and all parties understand the risks and agree  with anesthesia plan.  All questions answered.   ASA Score: 3  Day of Surgery Review of History & Physical: I have interviewed and examined the patient. I have reviewed the patient's H&P dated:     Ready For Surgery From Anesthesia Perspective.     .

## 2022-12-13 NOTE — PLAN OF CARE
Dr Chavez at  to discuss findings. VSS.  Pain at baseline, no GI bleeding. Pt to be discharged from unit.

## 2022-12-13 NOTE — [employer]OVATION PATIENT INSTRUCTIONS
Discharge Summary/Instructions after an Endoscopic Procedure  Patient Name: Viri Bird  Patient MRN: [id number]  Patient YOB: 1975 Tuesday, December 13, 2022 Ruben Chavez MD  Dear patient,  As a result of recent federal legislation (The Federal Cures Act), you may   receive lab or pathology results from your procedure in your [facility]   account before your physician is able to contact you. Your physician or   their representative will relay the results to you with their   recommendations at their soonest availability.  Thank you,  RESTRICTIONS:  During your procedure today, you received medications for sedation.  These   medications may affect your judgment, balance and coordination.  Therefore,   for 24 hours, you have the following restrictions:   - DO NOT drive a car, operate machinery, make legal/financial decisions,   sign important papers or drink alcohol.    ACTIVITY:  Today: no heavy lifting, straining or running due to procedural   sedation/anesthesia.  The following day: return to full activity including work.  DIET:  Eat and drink normally unless instructed otherwise.     TREATMENT FOR COMMON SIDE EFFECTS:  - Mild abdominal pain, nausea, belching, bloating or excessive gas:  rest,   eat lightly and use a heating pad.  - Sore Throat: treat with throat lozenges and/or gargle with warm salt   water.  - Because air was used during the procedure, expelling large amounts of air   from your rectum or belching is normal.  - If a bowel prep was taken, you may not have a bowel movement for 1-3 days.    This is normal.  SYMPTOMS TO WATCH FOR AND REPORT TO YOUR PHYSICIAN:  1. Abdominal pain or bloating, other than gas cramps.  2. Chest pain.  3. Back pain.  4. Signs of infection such as: chills or fever occurring within 24 hours   after the procedure.  5. Rectal bleeding, which would show as bright red, maroon, or black stools.   (A tablespoon of blood from the rectum is not serious,  especially if   hemorrhoids are present.)  6. Vomiting.  7. Weakness or dizziness.  GO DIRECTLY TO THE NEAREST EMERGENCY ROOM IF YOU HAVE ANY OF THE FOLLOWING:      Difficulty breathing              Chills and/or fever over 101 F   Persistent vomiting and/or vomiting blood   Severe abdominal pain   Severe chest pain   Black, tarry stools   Bleeding- more than one tablespoon   Any other symptom or condition that you feel may need urgent attention  Your doctor recommends these additional instructions:  If any biopsies were taken, your doctors clinic will contact you in 1 to 2   weeks with any results.  - Discharge patient to home.   - Resume previous diet.   - Continue present medications.   - Repeat colonoscopy at the next available appointment because the bowel   preparation was suboptimal.   - Return to referring physician.   - Patient has a contact number available for emergencies.  The signs and   symptoms of potential delayed complications were discussed with the   patient.  Return to normal activities tomorrow.  Written discharge   instructions were provided to the patient.   - Patient has a contact number available for emergencies.  The signs and   symptoms of potential delayed complications were discussed with the   patient.  Return to normal activities tomorrow.  Written discharge   instructions were provided to the patient.  For questions, problems or results please call your physician Ruben Chavez MD at Work:  [phone]  If you have any questions about the above instructions, call the GI   department at [phone] or call the endoscopy unit at [phone]   from 7am until 3 pm.  [facility], EMERGENCY ROOM PHONE NUMBER:   [phone]  IF A COMPLICATION OR EMERGENCY SITUATION ARISES AND YOU ARE UNABLE TO REACH   YOUR PHYSICIAN - GO DIRECTLY TO THE EMERGENCY ROOM.  I have read or have had read to me these discharge instructions for my   procedure and have  received a written copy.  I understand these   instructions and will follow-up with my physician if I have any questions.     __________________________________       _____________________________________  Nurse Signature                                          Patient/Designated   Responsible Party Signature  Ruben Chavez MD  12/13/2022 11:05:46 AM  This report has been verified and signed electronically.  Dear patient,  As a result of recent federal legislation (The Federal Cures Act), you may   receive lab or pathology results from your procedure in your [facility]   account before your physician is able to contact you. Your physician or   their representative will relay the results to you with their   recommendations at their soonest availability.  Thank you,  PROVATION

## 2022-12-13 NOTE — ANESTHESIA RELEASE NOTE
"Anesthesia Release from PACU Note    Patient: Viri Bird    Procedure(s) Performed: Procedure(s) (LRB):  COLONOSCOPY 10/29-card clearance received (N/A)    Anesthesia type: MAC    Post pain: Adequate analgesia    Post assessment: no apparent anesthetic complications, tolerated procedure well and no evidence of recall    Last Vitals:   Visit Vitals  BP (!) 99/52   Pulse (!) 56   Temp 37.4 °C (99.3 °F)   Resp 18   Ht 6' 3" (1.905 m)   Wt 112 kg (247 lb)   SpO2 97%   Breastfeeding No   BMI 30.87 kg/m²       Post vital signs: stable    Level of consciousness: awake, alert  and oriented    Nausea/Vomiting: no nausea/no vomiting    Complications: none    Airway Patency: patent    Respiratory: unassisted, spontaneous ventilation, room air    Cardiovascular: stable and blood pressure at baseline    Hydration: euvolemic  "

## 2022-12-13 NOTE — TRANSFER OF CARE
"Anesthesia Transfer of Care Note    Patient: Viri Bird    Procedure(s) Performed: Procedure(s) (LRB):  COLONOSCOPY 10/29-card clearance received (N/A)    Patient location: PACU    Anesthesia Type: MAC    Transport from OR: Transported from OR on room air with adequate spontaneous ventilation    Post pain: adequate analgesia    Post assessment: no apparent anesthetic complications and tolerated procedure well    Post vital signs: stable    Level of consciousness: awake, alert and oriented    Nausea/Vomiting: no nausea/vomiting    Complications: none    Transfer of care protocol was followed      Last vitals:   Visit Vitals  BP (!) 99/52   Pulse (!) 56   Temp 37.4 °C (99.3 °F)   Resp 18   Ht 6' 3" (1.905 m)   Wt 112 kg (247 lb)   SpO2 97%   Breastfeeding No   BMI 30.87 kg/m²     "

## 2022-12-13 NOTE — ANESTHESIA POSTPROCEDURE EVALUATION
Anesthesia Post Evaluation    Patient: Viri Bird    Procedure(s) Performed: Procedure(s) (LRB):  COLONOSCOPY 10/29-card clearance received (N/A)    Final Anesthesia Type: MAC      Patient location during evaluation: PACU  Patient participation: Yes- Able to Participate  Level of consciousness: awake and alert and oriented  Post-procedure vital signs: reviewed and stable  Pain management: adequate  Airway patency: patent  JEANNIE mitigation strategies: Multimodal analgesia  PONV status at discharge: No PONV  Anesthetic complications: no      Cardiovascular status: blood pressure returned to baseline and hemodynamically stable  Respiratory status: unassisted, spontaneous ventilation and room air  Hydration status: euvolemic  Follow-up not needed.          Vitals Value Taken Time   BP 99/52 12/13/22 1108   Temp 37.4 °C (99.3 °F) 12/13/22 1108   Pulse 56 12/13/22 1108   Resp 18 12/13/22 1108   SpO2 97 % 12/13/22 1108         No case tracking events are documented in the log.      Pain/Madeline Score: Madeline Score: 9 (12/13/2022 11:07 AM)

## 2022-12-13 NOTE — H&P
PRE PROCEDURE H&P    Patient Name: Viri Bird  MRN: [id number]  : 1975  Date of Procedure:  2022  Referring Physician: Ruben Chavez MD  Primary Physician: Scott Escobar MD  Procedure Physician: Ruben Chavez MD       Planned Procedure: Colonoscopy  Diagnosis: screening for colon cancer  Chief Complaint: Same as above    HPI: Patient is an 47 y.o. female is here for the above.     Last colonoscopy: Index colonoscopy  Family history: None   Anticoagulation: None     Past Medical History:   Past Medical History:   Diagnosis Date    Anemia 1990    Anxiety 2010    Depression     Encounter for blood transfusion 01/15/2008    Endometriosis of uterus 1999    Fibroid     Heart disease     Heart murmur 2008    Hypertension     Marfan syndrome         Past Surgical History:  Past Surgical History:   Procedure Laterality Date    ABDOMINAL SURGERY  2006    several-had abscesses and complications after wt loss surgery; repair of small bowl injury; subsequent colon resection 19    APPENDECTOMY  2009    Rupture    BREAST BIOPSY Left 2019    CERVIX LESION DESTRUCTION      cryo for cervical dysplasia    CHOLECYSTECTOMY      COLONOSCOPY  2018    Normal    CORONARY ARTERY BYPASS GRAFT (CABG)  2008    DILATION AND CURETTAGE OF UTERUS  2008    Miscarriage    LUNG REMOVAL, PARTIAL Right     2/3 of right lung removed; thought to be lung cancer, final path equals histoplasmosis    CHANDA-EN-Y PROCEDURE          Home Medications:  Prior to Admission medications    Medication Sig Start Date End Date Taking? Authorizing Provider   bumetanide (BUMEX) 1 MG tablet Take 1 tablet (1 mg total) by mouth once daily. 22  Yes Scott Escobar MD   calcium carbonate (OS-ESE) 600 mg calcium (1,500 mg) Tab Take 600 mg by mouth.   Yes Historical Provider   cyanocobalamin 1,000 mcg/mL injection Inject 1 mL (1,000 mcg total) into the muscle every 30 days. 22  Yes  Scott Escobar MD   ergocalciferol, vitamin D2, (VITAMIN D ORAL)    Yes Historical Provider   ferrous sulfate (FEOSOL) 325 mg (65 mg iron) Tab tablet Take 325 mg by mouth.   Yes Historical Provider   gabapentin (NEURONTIN) 600 MG tablet Take 1 tablet (600 mg total) by mouth 3 (three) times daily. 9/9/22  Yes Scott Escobar MD   iron,carb/vit C/vit B12/folic (IRON 100 PLUS ORAL)    Yes Historical Provider   potassium chloride (KLOR-CON) 20 mEq Pack Take 40 mEq by mouth once daily. 9/9/22  Yes Scott Escobar MD   spironolactone (ALDACTONE) 25 MG tablet Take 1 tablet (25 mg total) by mouth once daily. 3/14/22  Yes Aleksandr Oviedo MD   tiZANidine (ZANAFLEX) 4 MG tablet Take 2 tablets (8 mg total) by mouth 3 (three) times daily. 9/9/22  Yes Scott Escobar MD   traZODone (DESYREL) 150 MG tablet Take 1 tablet (150 mg total) by mouth every evening. 9/9/22 9/9/23 Yes Scott Escobar MD   venlafaxine (EFFEXOR-XR) 37.5 MG 24 hr capsule Take 1 capsule (37.5 mg total) by mouth once daily. 9/9/22 9/9/23 Yes Scott Escobar MD   promethazine (PHENERGAN) 25 MG suppository Place 25 mg rectally daily as needed.    Historical Provider        Allergies:  Review of patient's allergies indicates:   Allergen Reactions    Aspirin Anaphylaxis     Tongue swelling      Cephalexin Anaphylaxis and Nausea And Vomiting    Iodine and iodide containing products Other (See Comments)     Pt is not allergic to contrast dye IV     Levofloxacin Anaphylaxis    Sulfa (sulfonamide antibiotics) Anaphylaxis, Blisters, Dermatitis, Itching and Shortness Of Breath    Sulfamethoxazole-trimethoprim Anaphylaxis     3rd degree skin burning when given IV      Sulfasalazine Anaphylaxis    Tramadol Nausea And Vomiting    Adhesive Dermatitis, Hives and Itching     bandaids and adhesive on electrodes  bandaids and adhesive on electrodes, whelps      Adhesive tape-silicones Itching    Barium iodide Nausea And Vomiting     Oral only causes  "vomiting        Social History:   Social History     Socioeconomic History    Marital status:    Tobacco Use    Smoking status: Never    Smokeless tobacco: Never   Substance and Sexual Activity    Alcohol use: Not Currently     Comment: Once every other month    Drug use: Never    Sexual activity: Yes     Partners: Male     Birth control/protection: None     Comment:         Family History:  Family History   Problem Relation Age of Onset    Hypertension Mother     Rheum arthritis Mother     Cancer Mother     Diabetes Mother         Type 2 w/insulin    Lung cancer Mother         Lung cancer complications at 63    Heart attack Maternal Grandmother     Diabetes Maternal Grandmother         Brother Type 2    Hyperlipidemia Maternal Grandfather     Heart failure Maternal Grandfather     Heart failure Paternal Grandmother          from Heart Attack    Rheum arthritis Maternal Uncle         Blood sepsis    Cancer Maternal Aunt         Ovarian cancer    Cancer Maternal Aunt         Brain cancer    Migraines Brother        ROS: No acute cardiac events, no acute respiratory complaints.     Physical Exam (all patients):    /77   Pulse (!) 56   Temp 97.2 °F (36.2 °C) (Temporal)   Resp 16   Ht 6' 3" (1.905 m)   Wt 112 kg (247 lb)   SpO2 95%   Breastfeeding No   BMI 30.87 kg/m²   Lungs: Clear to auscultation bilaterally, respirations unlabored  Heart: Regular rate and rhythm, S1 and S2 normal, no obvious murmurs  Abdomen:         Soft, non-tender, bowel sounds normal, no masses, no organomegaly    Lab Results   Component Value Date    WBC 5.35 2022    MCV 83 2022    RDW 12.7 2022     2022    GLU 93 2022    HGBA1C 6.0 (H) 2022    BUN 8 2022     2022    K 4.2 2022     2022        SEDATION PLAN: per anesthesia      History reviewed, vital signs satisfactory, cardiopulmonary status satisfactory, sedation options, risks and " plans have been discussed with the patient  All their questions were answered and the patient agrees to the sedation procedures as planned and the patient is deemed an appropriate candidate for the sedation as planned.    Procedure explained to patient, informed consent obtained and placed in chart.    Ruben Chavez  12/13/2022  10:44 AM

## 2022-12-14 VITALS
RESPIRATION RATE: 16 BRPM | HEART RATE: 51 BPM | OXYGEN SATURATION: 97 % | SYSTOLIC BLOOD PRESSURE: 124 MMHG | TEMPERATURE: 99 F | DIASTOLIC BLOOD PRESSURE: 86 MMHG | HEIGHT: 72 IN | BODY MASS INDEX: 33.46 KG/M2 | WEIGHT: 247 LBS

## 2022-12-16 ENCOUNTER — HOSPITAL ENCOUNTER (OUTPATIENT)
Dept: SLEEP MEDICINE | Facility: HOSPITAL | Age: 47
Discharge: HOME | End: 2022-12-16
Attending: NURSE PRACTITIONER
Payer: MEDICARE

## 2022-12-16 DIAGNOSIS — F51.04 PSYCHOPHYSIOLOGICAL INSOMNIA: ICD-10-CM

## 2022-12-16 DIAGNOSIS — R06.83 PRIMARY SNORING: ICD-10-CM

## 2022-12-16 DIAGNOSIS — Z72.821 INADEQUATE SLEEP HYGIENE: ICD-10-CM

## 2022-12-16 PROCEDURE — 95810 POLYSOM 6/> YRS 4/> PARAM: CPT

## 2022-12-19 ENCOUNTER — OFFICE VISIT (OUTPATIENT)
Dept: PSYCHIATRY | Facility: CLINIC | Age: 47
End: 2022-12-19
Payer: MEDICARE

## 2022-12-19 DIAGNOSIS — G47.9 SLEEP DIFFICULTIES: ICD-10-CM

## 2022-12-19 DIAGNOSIS — F32.A ANXIETY AND DEPRESSION: Primary | ICD-10-CM

## 2022-12-19 DIAGNOSIS — F41.9 ANXIETY AND DEPRESSION: Primary | ICD-10-CM

## 2022-12-19 PROCEDURE — 90791 PSYCH DIAGNOSTIC EVALUATION: CPT | Mod: ,,, | Performed by: SOCIAL WORKER

## 2022-12-19 PROCEDURE — 99212 OFFICE O/P EST SF 10 MIN: CPT | Mod: PBBFAC | Performed by: SOCIAL WORKER

## 2022-12-19 PROCEDURE — 99999 PR PBB SHADOW E&M-EST. PATIENT-LVL II: CPT | Mod: PBBFAC,,, | Performed by: SOCIAL WORKER

## 2022-12-19 PROCEDURE — 90791 PR PSYCHIATRIC DIAGNOSTIC EVALUATION: ICD-10-PCS | Mod: ,,, | Performed by: SOCIAL WORKER

## 2022-12-19 PROCEDURE — 99999 PR PBB SHADOW E&M-EST. PATIENT-LVL II: ICD-10-PCS | Mod: PBBFAC,,, | Performed by: SOCIAL WORKER
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
## 2022-12-21 PROBLEM — F51.04 PSYCHOPHYSIOLOGICAL INSOMNIA: Status: ACTIVE | Noted: 2022-12-21

## 2022-12-21 PROCEDURE — 95810 PR POLYSOMNOGRAPHY, 4 OR MORE: ICD-10-PCS | Mod: 26,,, | Performed by: PSYCHOLOGIST

## 2022-12-21 PROCEDURE — 95810 POLYSOM 6/> YRS 4/> PARAM: CPT | Mod: 26,,, | Performed by: PSYCHOLOGIST

## 2022-12-22 NOTE — PROCEDURES
"Patient Name: Neelima Bird       Date of Report: 22 Study Date:  2022  [facility] No.: [id number]       : 1975       Time of Study:  09:26:36 PM - 04:55:15 AM   Sex:  Female   Age:  47 year   Weight:  254.0 lbs   Height:  6' 4"      Type of Study:  Diagnostic    REASONS FOR REFERRAL: Ms Bird is a 47 year year old female, referred for diagnostic polysomnography by Elizabeth LeJeune, APRN, based on the patient's reported loud snoring, observed respiratory pauses in sleep, unrefreshing sleep and daytime hypersom-nolence.  Her [place] Sleepiness Scale score was 4, not clinically significant, but her STOP-BANG score was 4, intermediate risk of JEANNIE.  Dr. Scott Escobar is the patient's primary care physician.    STUDY PARAMETERS: This diagnostic study involved analysis of the patient's sleep pattern while breathing unassisted. The study was performed with a sleep technologist in attendance for the entire test period, with video monitoring throughout the study, and routine laboratory clinical parameters recorded:  NOTE:  This polysomnographic sleep study was reviewed epoch-by-epoch, interpreted and signed below by an American Academy of Sleep Medicine Board Certified Sleep Specialist    SUMMARY STATEMENTS  DIAGNOSTIC IMPRESSIONS  F51.04  /  307.42 Psychophysiological Insomnia (stress - related and / or conditioned; with depression and anxiety)    R06.83  /  780.53-1 Primary Snoring   Z72.821 / V69.4 Inadequate Sleep Hygiene   F51.12   / 307.44 r/o Insufficient Sleep Syndrome    TREATMENT RECOMMENDATIONS  Treat, or refer to Sleep Disorders Center.  The diagnostic polysomnography revealed no diagnosable obstructive sleep apnea / hypopnea syndrome (A + H Index = 1.9 events / hr asleep with 0.3 respiratory event - related arousals / hr asleep, and no RERAs (respiratory effort -  related arousals) for the study.  The mean SpO2 value was 94.0 %, moderate, minimum oxygen saturation during sleep was 84.0 " %, and the maximum waking baseline SpO2 was 98 for the study 98.0 %.  Sporadic, mild snoring was noted.  A CPAP titration polysomnography is not recommended.  If treatment of snoring (sporadic, mild during the PSG) is desired, consider a reversible treatment such as a dental oral device.  If a permanent procedure such as UPPP is preferred, periodic polysomnography may be needed because signs of worsening apnea could be missed (silent apneas) if JEANNIE develops or becomes more severe.  Weight loss to the normal range is recommended as it can decrease respiratory events and snoring in overweight patients.  The following changes in sleep hygiene / sleep - related behavior are recommended after medical treatments are successful  Set time for sleep to number of hours of sleep needed for adequate daytime functioning (7.5 to 9.0 hrs / night).  If insomnia persists after treatments for medical sleep disorders have proven effective, and  RLS has been ruled out or effectively treated, recommend follow - up inquiry regarding frequency, duration and nature of reported sleep loss, and poor sleep maintenance (stress - related and / or conditioned psychophysiological insomnia) and referral for behavioral and cognitive - behavioral treatment of insomnia, as indicated.  Please see SDI.  Consider  behavioral and cognitive / behavioral treatments for stress, anxiety and depression to complement the medication and to increase the probability of long - term adaptive change.  Sleep might be expected to further improve.  Review the basis for prescribing antidepressant medication.  Sleep disorders of the type and magnitude Ms Bird has frequently produce many of the signs and symptoms of depression.    See below for a complete interpretation of data from the polysomnography and Sleep Disorders Inventory.     Thank you for referring this patient to the [facility].      Jonathan Ramachandran, Ph.D., ABPP; Diplomate, American  Board of Sleep Medicine

## 2023-01-19 ENCOUNTER — PATIENT MESSAGE (OUTPATIENT)
Dept: PSYCHIATRY | Facility: CLINIC | Age: 48
End: 2023-01-19

## 2023-01-31 ENCOUNTER — HOSPITAL ENCOUNTER (EMERGENCY)
Facility: HOSPITAL | Age: 48
Discharge: HOME | End: 2023-01-31
Attending: EMERGENCY MEDICINE
Payer: MEDICARE

## 2023-01-31 VITALS
RESPIRATION RATE: 20 BRPM | SYSTOLIC BLOOD PRESSURE: 155 MMHG | WEIGHT: 240.75 LBS | TEMPERATURE: 98 F | OXYGEN SATURATION: 98 % | BODY MASS INDEX: 30.09 KG/M2 | DIASTOLIC BLOOD PRESSURE: 74 MMHG | HEART RATE: 79 BPM

## 2023-01-31 DIAGNOSIS — R07.9 CHEST PAIN: Primary | ICD-10-CM

## 2023-01-31 DIAGNOSIS — I71.21 ANEURYSM OF ASCENDING AORTA WITHOUT RUPTURE: ICD-10-CM

## 2023-01-31 DIAGNOSIS — F43.9 STRESS AT HOME: ICD-10-CM

## 2023-01-31 LAB
ALBUMIN SERPL BCP-MCNC: 3.8 G/DL (ref 3.5–5.2)
ALP SERPL-CCNC: 142 U/L (ref 55–135)
ALT SERPL W/O P-5'-P-CCNC: 11 U/L (ref 10–44)
ANION GAP SERPL CALC-SCNC: 9 MMOL/L (ref 8–16)
ANISOCYTOSIS BLD QL SMEAR: SLIGHT
AST SERPL-CCNC: 18 U/L (ref 10–40)
BASOPHILS # BLD AUTO: 0.03 K/UL (ref 0–0.2)
BASOPHILS NFR BLD: 0.5 % (ref 0–1.9)
BILIRUB SERPL-MCNC: 0.5 MG/DL (ref 0.1–1)
BNP SERPL-MCNC: 101 PG/ML (ref 0–99)
BUN SERPL-MCNC: 9 MG/DL (ref 6–20)
CALCIUM SERPL-MCNC: 9.1 MG/DL (ref 8.7–10.5)
CHLORIDE SERPL-SCNC: 107 MMOL/L (ref 95–110)
CO2 SERPL-SCNC: 25 MMOL/L (ref 23–29)
CREAT SERPL-MCNC: 0.7 MG/DL (ref 0.5–1.4)
DACRYOCYTES BLD QL SMEAR: ABNORMAL
DIFFERENTIAL METHOD: ABNORMAL
EOSINOPHIL # BLD AUTO: 0 K/UL (ref 0–0.5)
EOSINOPHIL NFR BLD: 0.3 % (ref 0–8)
ERYTHROCYTE [DISTWIDTH] IN BLOOD BY AUTOMATED COUNT: 12.8 % (ref 11.5–14.5)
EST. GFR  (NO RACE VARIABLE): >60 ML/MIN/1.73 M^2
GLUCOSE SERPL-MCNC: 148 MG/DL (ref 70–110)
HCT VFR BLD AUTO: 36.8 % (ref 37–48.5)
HGB BLD-MCNC: 11.4 G/DL (ref 12–16)
IMM GRANULOCYTES # BLD AUTO: 0.02 K/UL (ref 0–0.04)
IMM GRANULOCYTES NFR BLD AUTO: 0.3 % (ref 0–0.5)
LYMPHOCYTES # BLD AUTO: 1.8 K/UL (ref 1–4.8)
LYMPHOCYTES NFR BLD: 28.3 % (ref 18–48)
MCH RBC QN AUTO: 25.4 PG (ref 27–31)
MCHC RBC AUTO-ENTMCNC: 31 G/DL (ref 32–36)
MCV RBC AUTO: 82 FL (ref 82–98)
MONOCYTES # BLD AUTO: 0.6 K/UL (ref 0.3–1)
MONOCYTES NFR BLD: 9.2 % (ref 4–15)
NEUTROPHILS # BLD AUTO: 3.9 K/UL (ref 1.8–7.7)
NEUTROPHILS NFR BLD: 61.4 % (ref 38–73)
NRBC BLD-RTO: 0 /100 WBC
PLATELET # BLD AUTO: 250 K/UL (ref 150–450)
PLATELET BLD QL SMEAR: ABNORMAL
PMV BLD AUTO: 11 FL (ref 9.2–12.9)
POIKILOCYTOSIS BLD QL SMEAR: SLIGHT
POTASSIUM SERPL-SCNC: 3.7 MMOL/L (ref 3.5–5.1)
PROT SERPL-MCNC: 6.8 G/DL (ref 6–8.4)
RBC # BLD AUTO: 4.49 M/UL (ref 4–5.4)
SODIUM SERPL-SCNC: 141 MMOL/L (ref 136–145)
TARGETS BLD QL SMEAR: ABNORMAL
TROPONIN I SERPL DL<=0.01 NG/ML-MCNC: <0.006 NG/ML (ref 0–0.03)
TROPONIN I SERPL DL<=0.01 NG/ML-MCNC: <0.006 NG/ML (ref 0–0.03)
WBC # BLD AUTO: 6.32 K/UL (ref 3.9–12.7)

## 2023-01-31 PROCEDURE — 99285 EMERGENCY DEPT VISIT HI MDM: CPT | Mod: 25

## 2023-01-31 PROCEDURE — 96374 THER/PROPH/DIAG INJ IV PUSH: CPT | Mod: 59

## 2023-01-31 PROCEDURE — 84484 ASSAY OF TROPONIN QUANT: CPT | Performed by: PHYSICIAN ASSISTANT

## 2023-01-31 PROCEDURE — 25500020 PHARM REV CODE 255: Performed by: EMERGENCY MEDICINE

## 2023-01-31 PROCEDURE — 84484 ASSAY OF TROPONIN QUANT: CPT | Mod: 91 | Performed by: EMERGENCY MEDICINE

## 2023-01-31 PROCEDURE — 93010 ELECTROCARDIOGRAM REPORT: CPT | Mod: ,,, | Performed by: STUDENT IN AN ORGANIZED HEALTH CARE EDUCATION/TRAINING PROGRAM

## 2023-01-31 PROCEDURE — 85025 COMPLETE CBC W/AUTO DIFF WBC: CPT | Performed by: PHYSICIAN ASSISTANT

## 2023-01-31 PROCEDURE — 93005 ELECTROCARDIOGRAM TRACING: CPT

## 2023-01-31 PROCEDURE — 80053 COMPREHEN METABOLIC PANEL: CPT | Performed by: PHYSICIAN ASSISTANT

## 2023-01-31 PROCEDURE — 83880 ASSAY OF NATRIURETIC PEPTIDE: CPT | Performed by: PHYSICIAN ASSISTANT

## 2023-01-31 PROCEDURE — 96375 TX/PRO/DX INJ NEW DRUG ADDON: CPT

## 2023-01-31 PROCEDURE — 93010 EKG 12-LEAD: ICD-10-PCS | Mod: ,,, | Performed by: STUDENT IN AN ORGANIZED HEALTH CARE EDUCATION/TRAINING PROGRAM

## 2023-01-31 PROCEDURE — 63600175 PHARM REV CODE 636 W HCPCS: Performed by: EMERGENCY MEDICINE

## 2023-01-31 RX ORDER — DIPHENHYDRAMINE HYDROCHLORIDE 50 MG/ML
25 INJECTION INTRAMUSCULAR; INTRAVENOUS
Status: COMPLETED | OUTPATIENT
Start: 2023-01-31 | End: 2023-01-31

## 2023-01-31 RX ORDER — HEPARIN 100 UNIT/ML
5 SYRINGE INTRAVENOUS
Status: COMPLETED | OUTPATIENT
Start: 2023-01-31 | End: 2023-01-31

## 2023-01-31 RX ORDER — ONDANSETRON 2 MG/ML
4 INJECTION INTRAMUSCULAR; INTRAVENOUS
Status: COMPLETED | OUTPATIENT
Start: 2023-01-31 | End: 2023-01-31

## 2023-01-31 RX ORDER — METHYLPREDNISOLONE SOD SUCC 125 MG
125 VIAL (EA) INJECTION
Status: COMPLETED | OUTPATIENT
Start: 2023-01-31 | End: 2023-01-31

## 2023-01-31 RX ORDER — MORPHINE SULFATE 4 MG/ML
4 INJECTION, SOLUTION INTRAMUSCULAR; INTRAVENOUS
Status: COMPLETED | OUTPATIENT
Start: 2023-01-31 | End: 2023-01-31

## 2023-01-31 RX ADMIN — IOHEXOL 100 ML: 350 INJECTION, SOLUTION INTRAVENOUS at 04:01

## 2023-01-31 RX ADMIN — MORPHINE SULFATE 4 MG: 4 INJECTION INTRAVENOUS at 03:01

## 2023-01-31 RX ADMIN — HEPARIN 500 UNITS: 100 SYRINGE at 05:01

## 2023-01-31 RX ADMIN — DIPHENHYDRAMINE HYDROCHLORIDE 25 MG: 50 INJECTION, SOLUTION INTRAMUSCULAR; INTRAVENOUS at 04:01

## 2023-01-31 RX ADMIN — ONDANSETRON 4 MG: 2 INJECTION INTRAMUSCULAR; INTRAVENOUS at 03:01

## 2023-01-31 RX ADMIN — METHYLPREDNISOLONE SODIUM SUCCINATE 125 MG: 125 INJECTION, POWDER, FOR SOLUTION INTRAMUSCULAR; INTRAVENOUS at 04:01

## 2023-01-31 NOTE — ED PROVIDER NOTES
SCRIBE #1 NOTE: I, Thomas Servin, cony scribing for, and in the presence of, Lisa Jama MD. I have scribed the entire note.      History      Chief Complaint   Patient presents with    Chest Pain     CP, SOB onset 0400 AM, syncopal episode 1 hour ago.        Review of patient's allergies indicates:   Allergen Reactions    Aspirin Anaphylaxis     Tongue swelling      Cephalexin Anaphylaxis and Nausea And Vomiting    Iodine and iodide containing products Other (See Comments)     Pt is not allergic to contrast dye IV     Levofloxacin Anaphylaxis    Sulfa (sulfonamide antibiotics) Anaphylaxis, Blisters, Dermatitis, Itching and Shortness Of Breath    Sulfamethoxazole-trimethoprim Anaphylaxis     3rd degree skin burning when given IV      Sulfasalazine Anaphylaxis    Tramadol Nausea And Vomiting    Adhesive Dermatitis, Hives and Itching     bandaids and adhesive on electrodes  bandaids and adhesive on electrodes, whelps      Adhesive tape-silicones Itching    Barium iodide Nausea And Vomiting     Oral only causes vomiting        HPI   HPI    1/31/2023, 2:45 PM   History obtained from the patient      History of Present Illness: Viri Bird is a 48 y.o. female patient who presents to the Emergency Department for L-sided chest pain, onset this morning. Symptoms are constant and moderate in severity. No mitigating or exacerbating factors reported. Associated sxs include n/v. Pt also reports a syncopal episode earlier today while entering her car. Patient denies any fever, chills, SOB, weakness, numbness, dizziness, headache, and all other sxs at this time. No prior Tx reported. No further complaints or concerns at this time.     Arrival mode: Personal vehicle    PCP: Scott Escobar MD       Past Medical History:  Past Medical History:   Diagnosis Date    Anemia 02/01/1990    Anxiety 08/01/2010    Depression 2008    Encounter for blood transfusion 01/15/2008    Endometriosis of uterus 05/01/1999    Fibroid 2010     Heart disease     Heart murmur 2008    Hypertension 2009    Marfan syndrome        Past Surgical History:  Past Surgical History:   Procedure Laterality Date    ABDOMINAL SURGERY  2006    several-had abscesses and complications after wt loss surgery; repair of small bowl injury; subsequent colon resection 17    APPENDECTOMY  2009    Rupture    BREAST BIOPSY Left 2019    CERVIX LESION DESTRUCTION      cryo for cervical dysplasia    CHOLECYSTECTOMY      COLONOSCOPY  2018    Normal    COLONOSCOPY N/A 2022    Procedure: COLONOSCOPY 10/29-card clearance received;  Surgeon: Ruben Chavez MD;  Location: [facility] ENDO;  Service: Endoscopy;  Laterality: N/A;    CORONARY ARTERY BYPASS GRAFT (CABG)      DILATION AND CURETTAGE OF UTERUS      Miscarriage    LUNG REMOVAL, PARTIAL Right     2/3 of right lung removed; thought to be lung cancer, final path equals histoplasmosis    CHANDA-EN-Y PROCEDURE           Family History:  Family History   Problem Relation Age of Onset    Hypertension Mother     Rheum arthritis Mother     Cancer Mother     Diabetes Mother         Type 2 w/insulin    Lung cancer Mother         Lung cancer complications at 63    Heart attack Maternal Grandmother     Diabetes Maternal Grandmother         Brother Type 2    Hyperlipidemia Maternal Grandfather     Heart failure Maternal Grandfather     Heart failure Paternal Grandmother          from Heart Attack    Rheum arthritis Maternal Uncle         Blood sepsis    Cancer Maternal Aunt         Ovarian cancer    Cancer Maternal Aunt         Brain cancer    Migraines Brother        Social History:  Social History     Tobacco Use    Smoking status: Never    Smokeless tobacco: Never   Substance and Sexual Activity    Alcohol use: Not Currently     Comment: Once every other month    Drug use: Never    Sexual activity: Yes     Partners: Male     Birth control/protection: None     Comment:         ROS   Review of Systems    Constitutional:  Negative for chills and fever.   HENT:  Negative for sore throat.    Respiratory:  Negative for shortness of breath.    Cardiovascular:  Positive for chest pain (L).   Gastrointestinal:  Positive for nausea and vomiting. Negative for diarrhea.   Genitourinary:  Negative for dysuria.   Musculoskeletal:  Negative for back pain.   Skin:  Negative for rash.   Neurological:  Positive for syncope. Negative for dizziness, weakness, light-headedness, numbness and headaches.   Hematological:  Does not bruise/bleed easily.   All other systems reviewed and are negative.    Physical Exam      Initial Vitals [01/31/23 1132]   BP Pulse Resp Temp SpO2   136/65 65 16 98.3 °F (36.8 °C) 99 %      MAP       --          Physical Exam  Nursing Notes and Vital Signs Reviewed.  Constitutional: Patient is in no acute distress. Well-developed and well-nourished.  Head: Atraumatic. Normocephalic.  Eyes: PERRL. EOM intact. Conjunctivae are not pale. No scleral icterus.  ENT: Mucous membranes are moist. Oropharynx is clear and symmetric.    Neck: Supple. Full ROM.  Cardiovascular: Regular rate. Regular rhythm. No murmurs, rubs, or gallops. Distal pulses are 2+ and symmetric.  Pulmonary/Chest: No respiratory distress. Clear to auscultation bilaterally. No wheezing or rales.  Abdominal: Soft and non-distended.  There is no tenderness.  No rebound, guarding, or rigidity.   Musculoskeletal: Moves all extremities. No obvious deformities. No edema.  Skin: Warm and dry.  Neurological:  Alert, awake, and appropriate.  Normal speech.  No acute focal neurological deficits are appreciated.  Psychiatric: Normal affect. Good eye contact. Appropriate in content.    ED Course    Procedures  ED Vital Signs:  Vitals:    01/31/23 1132 01/31/23 1545 01/31/23 1615   BP: 136/65     Pulse: 65  86   Resp: 16 18    Temp: 98.3 °F (36.8 °C)     TempSrc: Oral     SpO2: 99%     Weight: 109.2 kg (240 lb 11.9 oz)         Abnormal Lab Results:  Labs  Reviewed   CBC W/ AUTO DIFFERENTIAL - Abnormal; Notable for the following components:       Result Value    Hemoglobin 11.4 (*)     Hematocrit 36.8 (*)     MCH 25.4 (*)     MCHC 31.0 (*)     All other components within normal limits   COMPREHENSIVE METABOLIC PANEL - Abnormal; Notable for the following components:    Glucose 148 (*)     Alkaline Phosphatase 142 (*)     All other components within normal limits   B-TYPE NATRIURETIC PEPTIDE - Abnormal; Notable for the following components:     (*)     All other components within normal limits   TROPONIN I   TROPONIN I        All Lab Results:  Results for orders placed or performed during the hospital encounter of 01/31/23   CBC auto differential   Result Value Ref Range    WBC 6.32 3.90 - 12.70 K/uL    RBC 4.49 4.00 - 5.40 M/uL    Hemoglobin 11.4 (L) 12.0 - 16.0 g/dL    Hematocrit 36.8 (L) 37.0 - 48.5 %    MCV 82 82 - 98 fL    MCH 25.4 (L) 27.0 - 31.0 pg    MCHC 31.0 (L) 32.0 - 36.0 g/dL    RDW 12.8 11.5 - 14.5 %    Platelets 250 150 - 450 K/uL    MPV 11.0 9.2 - 12.9 fL    Immature Granulocytes 0.3 0.0 - 0.5 %    Gran # (ANC) 3.9 1.8 - 7.7 K/uL    Immature Grans (Abs) 0.02 0.00 - 0.04 K/uL    Lymph # 1.8 1.0 - 4.8 K/uL    Mono # 0.6 0.3 - 1.0 K/uL    Eos # 0.0 0.0 - 0.5 K/uL    Baso # 0.03 0.00 - 0.20 K/uL    nRBC 0 0 /100 WBC    Gran % 61.4 38.0 - 73.0 %    Lymph % 28.3 18.0 - 48.0 %    Mono % 9.2 4.0 - 15.0 %    Eosinophil % 0.3 0.0 - 8.0 %    Basophil % 0.5 0.0 - 1.9 %    Platelet Estimate Appears normal     Aniso Slight     Poik Slight     Target Cells Occasional     Tear Drop Cells Occasional     Differential Method Automated    Comprehensive metabolic panel   Result Value Ref Range    Sodium 141 136 - 145 mmol/L    Potassium 3.7 3.5 - 5.1 mmol/L    Chloride 107 95 - 110 mmol/L    CO2 25 23 - 29 mmol/L    Glucose 148 (H) 70 - 110 mg/dL    BUN 9 6 - 20 mg/dL    Creatinine 0.7 0.5 - 1.4 mg/dL    Calcium 9.1 8.7 - 10.5 mg/dL    Total Protein 6.8 6.0 - 8.4 g/dL     Albumin 3.8 3.5 - 5.2 g/dL    Total Bilirubin 0.5 0.1 - 1.0 mg/dL    Alkaline Phosphatase 142 (H) 55 - 135 U/L    AST 18 10 - 40 U/L    ALT 11 10 - 44 U/L    Anion Gap 9 8 - 16 mmol/L    eGFR >60 >60 mL/min/1.73 m^2   Troponin I #1   Result Value Ref Range    Troponin I <0.006 0.000 - 0.026 ng/mL   BNP   Result Value Ref Range     (H) 0 - 99 pg/mL   Troponin I   Result Value Ref Range    Troponin I <0.006 0.000 - 0.026 ng/mL     Imaging Results:  Imaging Results              CTA Chest Aorta Non Coronary (Final result)  Result time 01/31/23 17:32:56      Final result by Elisa Taylor MD (01/31/23 17:32:56)                   Impression:      Similar appearance to the ascending aorta measuring 4 cm in AP dimension    Status post right right coronary artery bypass to the PDA.  Questionable mild focal narrowing at the mid bypass graft, see series 2, image 259.  Recommend clinical correlation and follow-up    All CT scans at this facility are performed  using dose modulation techniques as appropriate to performed exam including the following:  automated exposure control; adjustment of mA and/or kV according to the patients size (this includes techniques or standardized protocols for targeted exams where dose is matched to indication/reason for exam: i.e. extremities or head);  iterative reconstruction technique.      Electronically signed by: Brandon Pérez  Date:    01/31/2023  Time:    17:32               Narrative:    EXAMINATION:  CTA CHEST AORTA NON CORONARY    CLINICAL HISTORY:  aneruysm;    TECHNIQUE:  CTA chest aorta non coronary.    COMPARISON:  None    FINDINGS:  Patient is status post coronary artery bypass.  Ectatic ascending aorta measuring up to 4 cm in AP dimension.  There is mild subsegmental atelectasis in the right lower lobe.  Postsurgical changes in the right lower lobe.  Mild left atrial enlargement.  There is a patent right-sided PTA bypass graft with questionable mild focal narrowing at  the mid segment see series 2, image 260.  Motion averaged native LAD and circumflex arteries.  Internal mammary arteries are not heart for stated.  GE junction postsurgical changes identified.  No evidence for pulmonary emboli.  No evidence for dissection.  Mild cardiomegaly.  Fatty infiltration liver with hepatomegaly.  Status post cholecystectomy.  No descending aortic aneurysm identified.  Postsurgical changes with surgical clips along the hayde.                                       CT Chest Without Contrast (Final result)  Result time 01/31/23 15:16:25      Final result by Herbert Alcaraz MD (01/31/23 15:16:25)                   Impression:      Aneurysmal ascending thoracic aorta, 4 cm.  No acute finding on this noncontrast CT chest.      Electronically signed by: Herbert Alcaraz  Date:    01/31/2023  Time:    15:16               Narrative:    EXAMINATION:  CT CHEST WITHOUT CONTRAST    CLINICAL HISTORY:  Aortic aneurysm, known or suspected;    COMPARISON:  Chest radiograph from earlier today.    TECHNIQUE:  Axial CT images were obtained from the clavicles of the diaphragm..  Iterative reconstruction technique was used. The CT exam was performed using one or more of the following dose reduction techniques- Automated exposure control, adjustment of the mA and/or kV according to patient size, and/or use of iterative reconstructed technique.    FINDINGS:  Left-sided subclavian port is present with the tip seen in the SVC.    Surgical changes to the right lung.  No consolidation.  Minimal bibasilar atelectasis/scarring.  No pneumothorax or pleural effusion.    The ascending thoracic aorta measures 4 cm at the level of the main pulmonary artery.  Lack of IV contrast limits further evaluation for acute aortic pathology.    No mediastinal or axillary lymphadenopathy.  Lack of IV contrast limits evaluation for perihilar lymphadenopathy.  Heart size is within normal limits.  No coronary artery calcifications.    No  acute finding in the upper abdomen.  Postsurgical changes to the upper abdomen.  No acute or suspicious osseous finding.                                       X-Ray Chest AP Portable (Final result)  Result time 01/31/23 12:00:56      Final result by RADHA Dunne Sr., MD (01/31/23 12:00:56)                   Impression:      1. There is no evidence of an acute pulmonary process.  2. A left subclavian venous line remains in place.  3. There are surgical changes associated with a prior sternotomy.  .      Electronically signed by: Jose D Dunne MD  Date:    01/31/2023  Time:    12:00               Narrative:    EXAMINATION:  XR CHEST AP PORTABLE    CLINICAL HISTORY:  Chest Pain;    COMPARISON:  08/22/2022    FINDINGS:  A left subclavian venous line remains in place.  There are surgical changes associated with a prior sternotomy.  The size of the heart is normal.  There is no evidence of an acute pulmonary process.  There is no pneumothorax.  The costophrenic angles are sharp.                                     The EKG was ordered, reviewed, and independently interpreted by the ED provider.  Interpretation time: 11:36  Rate: 71 BPM  Rhythm: normal sinus rhythm  Interpretation: Nonspecific ST abnormality. No STEMI.           The Emergency Provider reviewed the vital signs and test results, which are outlined above.    ED Discussion     3:33 PM: Discussed pt's case with Dr. Ortiz (Cardiothoracic Surgery) who recommends chest CTA to r/o aortic dissection.    5:43 PM: Dr. Ortiz (Cardiothoracic Surgery) has reviewed pt's CTA and recommends discharging the pt home from the ED, outpatient follow up with Cardiothoracic Surgery in 3 months, and good blood pressure control    5:45 PM: Reassessed pt at this time. Discussed with pt all pertinent ED information and results. Discussed pt dx and plan of tx. Gave pt all f/u and return to the ED instructions. All questions and concerns were addressed at this time. Pt  expresses understanding of information and instructions, and is comfortable with plan to discharge. Pt is stable for discharge.    I discussed with patient and/or family/caretaker that evaluation in the ED does not suggest any emergent or life threatening medical conditions requiring immediate intervention beyond what was provided in the ED, and I believe patient is safe for discharge.  Regardless, an unremarkable evaluation in the ED does not preclude the development or presence of a serious of life threatening condition. As such, patient was instructed to return immediately for any worsening or change in current symptoms.           ED Medication(s):  Medications   heparin, porcine (PF) 100 unit/mL injection flush 500 Units (has no administration in time range)   ondansetron injection 4 mg (4 mg Intravenous Given by Other 1/31/23 1544)   morphine injection 4 mg (4 mg Intravenous Given by Other 1/31/23 1545)   diphenhydrAMINE injection 25 mg (25 mg Intravenous Given 1/31/23 1614)   methylPREDNISolone sodium succinate injection 125 mg (125 mg Intravenous Given 1/31/23 1615)   iohexoL (OMNIPAQUE 350) injection 100 mL (100 mLs Intravenous Given 1/31/23 1644)     New Prescriptions    No medications on file      Follow-up Information       [facility]. Schedule an appointment as soon as possible for a visit in 1 week.    Specialty: Cardiothoracic Surgery  Why: Return to the Emergency Room, If symptoms worsen  Contact information:  [address] [facility] [id number] [phone]  Additional information:  4th Floor                             Medical Decision Making    Medical Decision Making:   Clinical Tests:   Lab Tests: Ordered and Reviewed  Radiological Study: Ordered and Reviewed  Medical Tests: Ordered and Reviewed  ED Management:  Patient with hx of Marfan's, HTN, prior CABG presents with chest pain and syncope.  Vital signs reviewed and normal, cardiac workup performed,  ECG reviewed and normal, two sets of cardiac enzymes negative, CTA ordered and results reviewed, cardiothoracic surgery consulted and has seen patient and reviewed imaging, states patient stable for discharge home, make sure keeps tight BP control, recommends outpatient follow up in 3 months, return precautions given to patient and all questions answered.    Additional MDM:   Heart Score:    History:          Moderately suspicious.  ECG:             Normal  Age:               45-65 years  Risk factors: 1-2 risk factors  Troponin:       Less than or equal to normal limit  Final Score: 3       Scribe Attestation:   Scribe #1: I performed the above scribed service and the documentation accurately describes the services I performed. I attest to the accuracy of the note.    Attending:   Physician Attestation Statement for Scribe #1: I, Lisa Jama MD, personally performed the services described in this documentation, as scribed by Thomas Servin, in my presence, and it is both accurate and complete.          Clinical Impression       ICD-10-CM ICD-9-CM   1. Chest pain  R07.9 786.50   2. Aneurysm of ascending aorta without rupture  I71.21 441.2   3. Stress at home  F43.9 V61.9       Disposition:   Disposition: Discharged  Condition: Stable       Lisa Jama MD  01/31/23 6746

## 2023-01-31 NOTE — FIRST PROVIDER EVALUATION
Medical screening examination initiated.  I have conducted a focused provider triage encounter, findings are as follows:    Brief history of present illness:  cp since 4am, then syncope at 10:15am.  +sob.  CABG in 2008.     There were no vitals filed for this visit.    Pertinent physical exam:  nad, alert        Preliminary workup initiated; this workup will be continued and followed by the physician or advanced practice provider that is assigned to the patient when roomed.

## 2023-02-02 ENCOUNTER — OFFICE VISIT (OUTPATIENT)
Dept: PSYCHIATRY | Facility: CLINIC | Age: 48
End: 2023-02-02
Payer: MEDICARE

## 2023-02-02 DIAGNOSIS — F43.0 ACUTE STRESS REACTION: Primary | ICD-10-CM

## 2023-02-02 DIAGNOSIS — T74.91XA VICTIM OF SPOUSAL OR PARTNER ABUSE, INITIAL ENCOUNTER: ICD-10-CM
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
## 2023-02-02 PROCEDURE — 90834 PR PSYCHOTHERAPY W/PATIENT, 45 MIN: ICD-10-PCS | Mod: 95,,, | Performed by: SOCIAL WORKER

## 2023-02-02 PROCEDURE — 90834 PSYTX W PT 45 MINUTES: CPT | Mod: 95,,, | Performed by: SOCIAL WORKER
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
## 2023-02-24 ENCOUNTER — OFFICE VISIT (OUTPATIENT)
Dept: PRIMARY CARE CLINIC | Facility: CLINIC | Age: 48
End: 2023-02-24
Payer: MEDICARE

## 2023-02-24 VITALS
BODY MASS INDEX: 33.14 KG/M2 | SYSTOLIC BLOOD PRESSURE: 130 MMHG | OXYGEN SATURATION: 95 % | WEIGHT: 244.69 LBS | HEART RATE: 63 BPM | DIASTOLIC BLOOD PRESSURE: 76 MMHG | TEMPERATURE: 99 F | HEIGHT: 72 IN

## 2023-02-24 DIAGNOSIS — R73.03 PRE-DIABETES: Primary | ICD-10-CM

## 2023-02-24 PROCEDURE — 99999 PR PBB SHADOW E&M-EST. PATIENT-LVL IV: CPT | Mod: PBBFAC,,, | Performed by: FAMILY MEDICINE

## 2023-02-24 PROCEDURE — 99213 OFFICE O/P EST LOW 20 MIN: CPT | Mod: S$PBB,,, | Performed by: FAMILY MEDICINE

## 2023-02-24 PROCEDURE — 99213 PR OFFICE/OUTPT VISIT, EST, LEVL III, 20-29 MIN: ICD-10-PCS | Mod: S$PBB,,, | Performed by: FAMILY MEDICINE

## 2023-02-24 PROCEDURE — 99214 OFFICE O/P EST MOD 30 MIN: CPT | Mod: PBBFAC | Performed by: FAMILY MEDICINE

## 2023-02-24 PROCEDURE — 99999 PR PBB SHADOW E&M-EST. PATIENT-LVL IV: ICD-10-PCS | Mod: PBBFAC,,, | Performed by: FAMILY MEDICINE

## 2023-02-24 NOTE — PROGRESS NOTES
Subjective:       Patient ID: Viri Bird is a 48 y.o. female.    Pmhx, fam hx, soc hx, surg hx, allergies, med list reviewed    Wt Readings from Last 3 Encounters:   02/24/23 1410 111 kg (244 lb 11.4 oz)   01/31/23 1132 109.2 kg (240 lb 11.9 oz)   12/13/22 1034 112 kg (247 lb)      Starting weight 260 (BMI 31)    Chief Complaint: f/u pre diabetes    Chart reviewed: is  from .  Had ED visit 1/31 secondary to chest pain and had dig med phone call.  Pt has had continued efforts at improvement of lifestyle chagnes.   Compliant with bp meds.     Pt has continued to have intentional weight loss. Did not want to try metformin. She now has bmi < 30.    Pt has moved away from ; she feels safe. Denies any acute issues. Has therapist.     HPI  Review of Systems   Constitutional:  Negative for activity change, appetite change, fatigue and fever.   HENT:  Negative for mouth dryness and goiter.    Eyes:  Negative for visual disturbance.   Respiratory:  Negative for apnea, cough, chest tightness and shortness of breath.    Cardiovascular:  Negative for chest pain, palpitations and leg swelling.   Gastrointestinal:  Negative for abdominal pain, constipation, diarrhea, nausea and vomiting.   Endocrine: Negative for cold intolerance, heat intolerance, polydipsia, polyphagia and polyuria.   Genitourinary:  Negative for frequency and menstrual problem.   Musculoskeletal:  Negative for arthralgias and myalgias.   Integumentary:  Negative for color change and rash.   Neurological:  Negative for tremors, seizures, syncope, weakness and headaches.   Psychiatric/Behavioral:  Negative for sleep disturbance and suicidal ideas. The patient is not nervous/anxious.        Objective:      Physical Exam  Vitals and nursing note reviewed.   Constitutional:       General: She is not in acute distress.  HENT:      Head: Normocephalic and atraumatic.      Mouth/Throat:      Pharynx: Oropharynx is clear.   Eyes:       General: No scleral icterus.     Pupils: Pupils are equal, round, and reactive to light.   Neck:      Comments: No TM  Cardiovascular:      Rate and Rhythm: Normal rate and regular rhythm.      Pulses: Normal pulses.      Heart sounds: Murmur (faint systolic (hx of marfans)) heard.     No friction rub. No gallop.   Pulmonary:      Effort: Pulmonary effort is normal. No respiratory distress.      Breath sounds: Normal breath sounds. No wheezing, rhonchi or rales.   Abdominal:      General: Bowel sounds are normal. There is no distension.      Palpations: Abdomen is soft.      Tenderness: There is no abdominal tenderness.   Musculoskeletal:         General: No swelling.      Cervical back: Normal range of motion and neck supple. No tenderness.   Lymphadenopathy:      Cervical: No cervical adenopathy.   Skin:     General: Skin is warm.      Capillary Refill: Capillary refill takes less than 2 seconds.      Findings: No erythema or rash.   Neurological:      Mental Status: She is alert and oriented to person, place, and time.   Psychiatric:         Mood and Affect: Mood normal.         Behavior: Behavior normal.       Assessment:         ICD-10-CM ICD-9-CM   1. Pre-diabetes  R73.03 790.29   2. BMI 29.0-29.9,adult  Z68.29 V85.25            Plan:       Pre-diabetes  -     Hemoglobin A1C; Future; Expected date: 02/24/2023   Chronic/labs today  Chart review  She has done well with lifestyle interventions    BMI 29.0-29.9,adult  -     Hemoglobin A1C; Future; Expected date: 02/24/2023

## 2023-02-28 ENCOUNTER — OFFICE VISIT (OUTPATIENT)
Dept: PSYCHIATRY | Facility: CLINIC | Age: 48
End: 2023-02-28
Payer: MEDICARE

## 2023-02-28 DIAGNOSIS — F41.9 ANXIETY AND DEPRESSION: ICD-10-CM

## 2023-02-28 DIAGNOSIS — T74.91XA VICTIM OF SPOUSAL OR PARTNER ABUSE, INITIAL ENCOUNTER: ICD-10-CM

## 2023-02-28 DIAGNOSIS — F43.0 ACUTE STRESS REACTION: Primary | ICD-10-CM

## 2023-02-28 DIAGNOSIS — F32.A ANXIETY AND DEPRESSION: ICD-10-CM
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
## 2023-02-28 PROCEDURE — 90834 PR PSYCHOTHERAPY W/PATIENT, 45 MIN: ICD-10-PCS | Mod: 95,,, | Performed by: SOCIAL WORKER

## 2023-02-28 PROCEDURE — 90834 PSYTX W PT 45 MINUTES: CPT | Mod: 95,,, | Performed by: SOCIAL WORKER
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
## 2023-03-09 ENCOUNTER — OFFICE VISIT (OUTPATIENT)
Dept: PRIMARY CARE CLINIC | Facility: CLINIC | Age: 48
End: 2023-03-09
Payer: MEDICARE

## 2023-03-09 VITALS
HEIGHT: 72 IN | SYSTOLIC BLOOD PRESSURE: 136 MMHG | DIASTOLIC BLOOD PRESSURE: 80 MMHG | WEIGHT: 237.19 LBS | BODY MASS INDEX: 32.13 KG/M2 | HEART RATE: 73 BPM | TEMPERATURE: 98 F

## 2023-03-09 DIAGNOSIS — F41.9 ANXIETY AND DEPRESSION: Primary | ICD-10-CM

## 2023-03-09 DIAGNOSIS — R10.9 CHRONIC ABDOMINAL PAIN: ICD-10-CM

## 2023-03-09 DIAGNOSIS — I87.2 VENOUS INSUFFICIENCY: ICD-10-CM

## 2023-03-09 DIAGNOSIS — G47.9 SLEEP DIFFICULTIES: ICD-10-CM

## 2023-03-09 DIAGNOSIS — J01.40 SUBACUTE PANSINUSITIS: ICD-10-CM

## 2023-03-09 DIAGNOSIS — F32.A ANXIETY AND DEPRESSION: Primary | ICD-10-CM

## 2023-03-09 DIAGNOSIS — I10 ESSENTIAL (PRIMARY) HYPERTENSION: ICD-10-CM

## 2023-03-09 DIAGNOSIS — J06.9 URI, ACUTE: ICD-10-CM

## 2023-03-09 DIAGNOSIS — G89.29 CHRONIC ABDOMINAL PAIN: ICD-10-CM

## 2023-03-09 DIAGNOSIS — E53.8 B12 DEFICIENCY: ICD-10-CM

## 2023-03-09 PROCEDURE — 99999 PR PBB SHADOW E&M-EST. PATIENT-LVL V: ICD-10-PCS | Mod: PBBFAC,,, | Performed by: FAMILY MEDICINE

## 2023-03-09 PROCEDURE — 99215 OFFICE O/P EST HI 40 MIN: CPT | Mod: PBBFAC,PN | Performed by: FAMILY MEDICINE

## 2023-03-09 PROCEDURE — 99999 PR PBB SHADOW E&M-EST. PATIENT-LVL V: CPT | Mod: PBBFAC,,, | Performed by: FAMILY MEDICINE

## 2023-03-09 PROCEDURE — 99215 OFFICE O/P EST HI 40 MIN: CPT | Mod: S$PBB,,, | Performed by: FAMILY MEDICINE

## 2023-03-09 PROCEDURE — 99215 PR OFFICE/OUTPT VISIT, EST, LEVL V, 40-54 MIN: ICD-10-PCS | Mod: S$PBB,,, | Performed by: FAMILY MEDICINE

## 2023-03-09 RX ORDER — PROMETHAZINE HYDROCHLORIDE AND DEXTROMETHORPHAN HYDROBROMIDE 6.25; 15 MG/5ML; MG/5ML
5 SYRUP ORAL EVERY 6 HOURS PRN
Qty: 120 ML | Refills: 0 | Status: SHIPPED | OUTPATIENT
Start: 2023-03-09 | End: 2023-03-19

## 2023-03-09 RX ORDER — GABAPENTIN 600 MG/1
600 TABLET ORAL 3 TIMES DAILY
Qty: 270 TABLET | Refills: 3 | Status: SHIPPED | OUTPATIENT
Start: 2023-03-09

## 2023-03-09 RX ORDER — SPIRONOLACTONE 25 MG/1
25 TABLET ORAL DAILY
Qty: 90 TABLET | Refills: 3 | Status: SHIPPED | OUTPATIENT
Start: 2023-03-09

## 2023-03-09 RX ORDER — CYANOCOBALAMIN 1000 UG/ML
1000 INJECTION, SOLUTION INTRAMUSCULAR; SUBCUTANEOUS
Qty: 3 ML | Refills: 3 | Status: SHIPPED | OUTPATIENT
Start: 2023-03-09

## 2023-03-09 RX ORDER — PREDNISONE 20 MG/1
40 TABLET ORAL DAILY
Qty: 10 TABLET | Refills: 0 | Status: SHIPPED | OUTPATIENT
Start: 2023-03-09 | End: 2023-03-14

## 2023-03-09 RX ORDER — VENLAFAXINE HYDROCHLORIDE 75 MG/1
75 CAPSULE, EXTENDED RELEASE ORAL DAILY
Qty: 90 CAPSULE | Refills: 3 | Status: SHIPPED | OUTPATIENT
Start: 2023-03-09 | End: 2024-03-08

## 2023-03-09 RX ORDER — SPIRONOLACTONE 25 MG/1
25 TABLET ORAL DAILY
Qty: 90 TABLET | Refills: 3 | Status: SHIPPED | OUTPATIENT
Start: 2023-03-09 | End: 2023-03-09

## 2023-03-09 RX ORDER — TIZANIDINE 4 MG/1
8 TABLET ORAL 3 TIMES DAILY
Qty: 540 TABLET | Refills: 3 | Status: SHIPPED | OUTPATIENT
Start: 2023-03-09

## 2023-03-09 RX ORDER — BUMETANIDE 1 MG/1
1 TABLET ORAL DAILY
Qty: 90 TABLET | Refills: 3 | Status: SHIPPED | OUTPATIENT
Start: 2023-03-09

## 2023-03-09 RX ORDER — AMOXICILLIN AND CLAVULANATE POTASSIUM 875; 125 MG/1; MG/1
1 TABLET, FILM COATED ORAL EVERY 12 HOURS
Qty: 14 TABLET | Refills: 0 | Status: SHIPPED | OUTPATIENT
Start: 2023-03-09 | End: 2023-03-16

## 2023-03-09 RX ORDER — TRAZODONE HYDROCHLORIDE 150 MG/1
150 TABLET ORAL NIGHTLY
Qty: 90 TABLET | Refills: 3 | Status: SHIPPED | OUTPATIENT
Start: 2023-03-09 | End: 2024-03-08

## 2023-03-09 RX ORDER — POTASSIUM CHLORIDE 1.5 G/1.58G
40 POWDER, FOR SOLUTION ORAL DAILY
Qty: 90 PACKET | Refills: 3 | Status: SHIPPED | OUTPATIENT
Start: 2023-03-09

## 2023-03-09 NOTE — PATIENT INSTRUCTIONS
Physically, everything looks pretty good today.      Congratulations on the weight loss! Keep up the good work.    Continue to eat a healthy diet.  Be careful with portion sizes.  Includes lots of fresh fruits, vegetables, whole grains, lean proteins.  See info below.    Keep hydrated.  Be sure to drink at least 8-10, 8 oz, glasses of water every day.    Stay active.  Try to do some sort of physical activity every day.  Nothing outrageous, just try walking for 10-15 minutes each day.     Let us try increasing your Effexor to 75 mg daily.  Okay to take two of the tablets you currently have until they run out.  In the meantime, I am sending a new prescription for the 75 mg tablets.  You can switch to those when you run out of the ones you currently have.      Hopefully, they will help quiet things down and let the trazodone work better for sleep.      For the abdominal pains, I am placing a referral to our colorectal specialists today.  Maybe they can offer surgical options to help get rid of your discomfort.      For the sinuses, let us try doing a course of Augmentin.  Prescription sent to pharmacy.  Additionally, supplement with my prednisone tablets.  Just take them for about 3-5 days until your symptoms improve.      You can also use my prescription cough medicine, as well.  Take it at bedtime.  It can also help you sleep.
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
## 2023-04-14 ENCOUNTER — PATIENT MESSAGE (OUTPATIENT)
Dept: GASTROENTEROLOGY | Facility: CLINIC | Age: 48
End: 2023-04-14

## 2023-04-14 ENCOUNTER — OFFICE VISIT (OUTPATIENT)
Dept: GASTROENTEROLOGY | Facility: CLINIC | Age: 48
End: 2023-04-14
Payer: MEDICARE

## 2023-04-14 ENCOUNTER — HOSPITAL ENCOUNTER (OUTPATIENT)
Dept: RADIOLOGY | Facility: HOSPITAL | Age: 48
Discharge: HOME | End: 2023-04-14
Attending: NURSE PRACTITIONER
Payer: MEDICARE

## 2023-04-14 VITALS
SYSTOLIC BLOOD PRESSURE: 122 MMHG | WEIGHT: 232.13 LBS | HEIGHT: 72 IN | DIASTOLIC BLOOD PRESSURE: 64 MMHG | BODY MASS INDEX: 31.44 KG/M2

## 2023-04-14 DIAGNOSIS — G89.29 CHRONIC ABDOMINAL PAIN: ICD-10-CM

## 2023-04-14 DIAGNOSIS — R10.9 CHRONIC ABDOMINAL PAIN: Primary | ICD-10-CM

## 2023-04-14 DIAGNOSIS — G89.29 CHRONIC ABDOMINAL PAIN: Primary | ICD-10-CM

## 2023-04-14 DIAGNOSIS — R10.9 CHRONIC ABDOMINAL PAIN: ICD-10-CM

## 2023-04-14 DIAGNOSIS — R11.2 NAUSEA AND VOMITING, UNSPECIFIED VOMITING TYPE: ICD-10-CM

## 2023-04-14 PROCEDURE — 99999 PR PBB SHADOW E&M-EST. PATIENT-LVL V: ICD-10-PCS | Mod: PBBFAC,,, | Performed by: NURSE PRACTITIONER

## 2023-04-14 PROCEDURE — 74019 RADEX ABDOMEN 2 VIEWS: CPT | Mod: 26,,, | Performed by: STUDENT IN AN ORGANIZED HEALTH CARE EDUCATION/TRAINING PROGRAM

## 2023-04-14 PROCEDURE — 74019 RADEX ABDOMEN 2 VIEWS: CPT | Mod: TC

## 2023-04-14 PROCEDURE — 74019 XR ABDOMEN FLAT AND ERECT: ICD-10-PCS | Mod: 26,,, | Performed by: STUDENT IN AN ORGANIZED HEALTH CARE EDUCATION/TRAINING PROGRAM

## 2023-04-14 PROCEDURE — 99999 PR PBB SHADOW E&M-EST. PATIENT-LVL V: CPT | Mod: PBBFAC,,, | Performed by: NURSE PRACTITIONER

## 2023-04-14 PROCEDURE — 99214 PR OFFICE/OUTPT VISIT, EST, LEVL IV, 30-39 MIN: ICD-10-PCS | Mod: S$PBB,,, | Performed by: NURSE PRACTITIONER

## 2023-04-14 PROCEDURE — 99214 OFFICE O/P EST MOD 30 MIN: CPT | Mod: S$PBB,,, | Performed by: NURSE PRACTITIONER

## 2023-04-14 PROCEDURE — 99215 OFFICE O/P EST HI 40 MIN: CPT | Mod: PBBFAC | Performed by: NURSE PRACTITIONER

## 2023-04-14 RX ORDER — ONDANSETRON 8 MG/1
8 TABLET, ORALLY DISINTEGRATING ORAL 2 TIMES DAILY
Qty: 20 TABLET | Refills: 0 | Status: SHIPPED | OUTPATIENT
Start: 2023-04-14 | End: 2023-08-21

## 2023-04-14 RX ORDER — PROMETHAZINE HYDROCHLORIDE 12.5 MG/1
12.5 SUPPOSITORY RECTAL EVERY 6 HOURS PRN
Qty: 10 SUPPOSITORY | Refills: 0 | Status: SHIPPED | OUTPATIENT
Start: 2023-04-14

## 2023-04-14 RX ORDER — PROMETHAZINE HYDROCHLORIDE 25 MG/1
25 TABLET ORAL EVERY 6 HOURS PRN
Qty: 20 TABLET | Refills: 0 | Status: SHIPPED | OUTPATIENT
Start: 2023-04-14

## 2023-04-14 NOTE — PROGRESS NOTES
Clinic Follow Up:  [facility] Follow Up Note    Reason for Follow Up:  The primary encounter diagnosis was Chronic abdominal pain. A diagnosis of Nausea and vomiting, unspecified vomiting type was also pertinent to this visit.    PCP: Scott Escobar   [address] / [place] [id number]    HPI:  This is a 48 y.o. female here for follow up of abdominal pain.   She has a history of multiple abdominal surgeries starting with gastric bypass with complication in 2006 and subsequent revision.   She has chronic abdominal pain. She feels like her food gets stuck in her abdomen and doesn't digest.   She watches her diet which helps with the pain some. Her son is getting  at the end of May.   She has had multiple CT scans in the past. She has a reaction to PO contrast that is used for CT scans. She does not have issue with barium contrast.     Review of Systems   Constitutional:  Negative for activity change and appetite change.        As per interval history above   Respiratory:  Negative for cough and shortness of breath.    Cardiovascular:  Negative for chest pain.   Gastrointestinal:  Positive for abdominal pain, nausea and vomiting. Negative for constipation and diarrhea.   Skin:  Negative for color change and rash.     Medical History:  Past Medical History:   Diagnosis Date    Anemia 02/01/1990    Anxiety 08/01/2010    Depression 2008    Encounter for blood transfusion 01/15/2008    Endometriosis of uterus 05/01/1999    Fibroid 2010    Heart disease     Heart murmur 01/14/2008    Hypertension 2009    Marfan syndrome        Surgical History:   Past Surgical History:   Procedure Laterality Date    ABDOMINAL SURGERY  06/04/2006    several-had abscesses and complications after wt loss surgery; repair of small bowl injury; subsequent colon resection 6/14/19    APPENDECTOMY  2009    Rupture    BREAST BIOPSY Left 2019    CERVIX LESION DESTRUCTION      cryo for cervical dysplasia     CHOLECYSTECTOMY      COLONOSCOPY      Normal    COLONOSCOPY N/A 2022    Procedure: COLONOSCOPY 10/29-card clearance received;  Surgeon: Ruben Chavez MD;  Location: [facility];  Service: Endoscopy;  Laterality: N/A;    CORONARY ARTERY BYPASS GRAFT (CABG)      DILATION AND CURETTAGE OF UTERUS      Miscarriage    LUNG REMOVAL, PARTIAL Right     2/3 of right lung removed; thought to be lung cancer, final path equals histoplasmosis    CHANDA-EN-Y PROCEDURE         Family History:   Family History   Problem Relation Age of Onset    Hypertension Mother     Rheum arthritis Mother     Cancer Mother     Diabetes Mother         Type 2 w/insulin    Lung cancer Mother         Lung cancer complications at 63    Heart attack Maternal Grandmother     Diabetes Maternal Grandmother         Brother Type 2    Hyperlipidemia Maternal Grandfather     Heart failure Maternal Grandfather     Heart failure Paternal Grandmother          from Heart Attack    Rheum arthritis Maternal Uncle         Blood sepsis    Cancer Maternal Aunt         Ovarian cancer    Cancer Maternal Aunt         Brain cancer    Migraines Brother        Social History:   Social History     Tobacco Use    Smoking status: Never    Smokeless tobacco: Never   Substance Use Topics    Alcohol use: Not Currently     Comment: Once every other month    Drug use: Never       Allergies:   Review of patient's allergies indicates:   Allergen Reactions    Aspirin Anaphylaxis     Tongue swelling      Cephalexin Anaphylaxis, Nausea And Vomiting and Hives    Iodine and iodide containing products Other (See Comments)     Pt is not allergic to contrast dye IV     Levofloxacin Anaphylaxis    Sulfa (sulfonamide antibiotics) Anaphylaxis, Blisters, Dermatitis, Itching and Shortness Of Breath    Sulfamethoxazole-trimethoprim Anaphylaxis     3rd degree skin burning when given IV      Sulfasalazine Anaphylaxis    Tramadol Nausea And Vomiting    Adhesive Dermatitis,  "Hives and Itching     bandaids and adhesive on electrodes  bandaids and adhesive on electrodes, whelps      Adhesive tape-silicones Itching    Barium iodide Nausea And Vomiting     Oral only causes vomiting       Home Medications:  Current Outpatient Medications on File Prior to Visit   Medication Sig Dispense Refill    bumetanide (BUMEX) 1 MG tablet Take 1 tablet (1 mg total) by mouth once daily. 90 tablet 3    calcium carbonate (OS-ESE) 600 mg calcium (1,500 mg) Tab Take 600 mg by mouth.      cyanocobalamin 1,000 mcg/mL injection Inject 1 mL (1,000 mcg total) into the muscle every 30 days. 3 mL 3    ergocalciferol, vitamin D2, (VITAMIN D ORAL)       ferrous sulfate (FEOSOL) 325 mg (65 mg iron) Tab tablet Take 325 mg by mouth.      gabapentin (NEURONTIN) 600 MG tablet Take 1 tablet (600 mg total) by mouth 3 (three) times daily. 270 tablet 3    iron,carb/vit C/vit B12/folic (IRON 100 PLUS ORAL)       potassium chloride (KLOR-CON) 20 mEq Pack Take 40 mEq by mouth once daily. 90 packet 3    spironolactone (ALDACTONE) 25 MG tablet Take 1 tablet (25 mg total) by mouth once daily. 90 tablet 3    tiZANidine (ZANAFLEX) 4 MG tablet Take 2 tablets (8 mg total) by mouth 3 (three) times daily. 540 tablet 3    traZODone (DESYREL) 150 MG tablet Take 1 tablet (150 mg total) by mouth every evening. 90 tablet 3    venlafaxine (EFFEXOR-XR) 75 MG 24 hr capsule Take 1 capsule (75 mg total) by mouth once daily. 90 capsule 3    [DISCONTINUED] promethazine (PHENERGAN) 25 MG suppository Place 25 mg rectally daily as needed.       No current facility-administered medications on file prior to visit.       /64 (BP Location: Left arm, Patient Position: Sitting, BP Method: Large (Manual))   Ht 6' 4" (1.93 m)   Wt 105.3 kg (232 lb 2.3 oz)   BMI 28.26 kg/m²   Body mass index is 28.26 kg/m².  Physical Exam  Abdominal:      Comments: Vertical linear scar down entire abdomen.        Labs: Pertinent labs reviewed.    Assessment:   1. " Chronic abdominal pain - likely 2/2 adhesions from extensive abdominal surgeries   2. Nausea and vomiting, unspecified vomiting type - when drinking PO contrast (likely Omnipaque). She does gag with barium contrast but does not cause intractable vomiting.       Recommendations:   - xray today  - UGI series with small bowel follow through with PO barium  - even though she does not get violently ill with barium, will still pre-medicate with nausea medications and she will have if needed after.      Chronic abdominal pain  -     Ambulatory referral/consult to Colorectal Surgery  -     X-Ray Abdomen Flat And Erect; Future; Expected date: 04/14/2023  -     FL Upper GI With Small Bowel (xpd); Future; Expected date: 04/14/2023    Nausea and vomiting, unspecified vomiting type  -     promethazine (PHENERGAN) 25 MG tablet; Take 1 tablet (25 mg total) by mouth every 6 (six) hours as needed for Nausea.  Dispense: 20 tablet; Refill: 0  -     ondansetron (ZOFRAN-ODT) 8 MG TbDL; Take 1 tablet (8 mg total) by mouth 2 (two) times daily.  Dispense: 20 tablet; Refill: 0  -     promethazine (PHENERGAN) 12.5 MG Supp; Place 1 suppository (12.5 mg total) rectally every 6 (six) hours as needed.  Dispense: 10 suppository; Refill: 0    Return to Clinic:  Follow up to be determined after results/ procedure(s).    Thank you for the opportunity to participate in the care of this patient.  LILA Holt

## 2023-04-24 ENCOUNTER — HOSPITAL ENCOUNTER (OUTPATIENT)
Dept: RADIOLOGY | Facility: HOSPITAL | Age: 48
Discharge: HOME | End: 2023-04-24
Attending: NURSE PRACTITIONER
Payer: MEDICARE

## 2023-04-24 DIAGNOSIS — R10.9 CHRONIC ABDOMINAL PAIN: ICD-10-CM

## 2023-04-24 DIAGNOSIS — G89.29 CHRONIC ABDOMINAL PAIN: ICD-10-CM

## 2023-04-24 PROCEDURE — 74240 X-RAY XM UPR GI TRC 1CNTRST: CPT | Mod: TC

## 2023-04-24 PROCEDURE — A9698 NON-RAD CONTRAST MATERIALNOC: HCPCS | Performed by: NURSE PRACTITIONER

## 2023-04-24 PROCEDURE — 74248 X-RAY SM INT F-THRU STD: CPT | Mod: TC

## 2023-04-24 PROCEDURE — 74248 X-RAY SM INT F-THRU STD: CPT | Mod: 26,,, | Performed by: RADIOLOGY

## 2023-04-24 PROCEDURE — 74248 FL UPPER GI WITH SMALL BOWEL (XPD): ICD-10-PCS | Mod: 26,,, | Performed by: RADIOLOGY

## 2023-04-24 PROCEDURE — 25500020 PHARM REV CODE 255: Performed by: NURSE PRACTITIONER

## 2023-04-24 PROCEDURE — 74240 FL UPPER GI WITH SMALL BOWEL (XPD): ICD-10-PCS | Mod: 26,,, | Performed by: RADIOLOGY

## 2023-04-24 PROCEDURE — 74240 X-RAY XM UPR GI TRC 1CNTRST: CPT | Mod: 26,,, | Performed by: RADIOLOGY

## 2023-04-24 RX ADMIN — BARIUM SULFATE 135 ML: 980 POWDER, FOR SUSPENSION ORAL at 01:04

## 2023-04-24 RX ADMIN — BARIUM SULFATE 150 G: 960 POWDER, FOR SUSPENSION ORAL at 01:04

## 2023-04-27 ENCOUNTER — PES CALL (OUTPATIENT)
Dept: ADMINISTRATIVE | Facility: CLINIC | Age: 48
End: 2023-04-27
Payer: MEDICARE

## 2023-05-24 ENCOUNTER — OFFICE VISIT (OUTPATIENT)
Dept: GASTROENTEROLOGY | Facility: CLINIC | Age: 48
End: 2023-05-24
Payer: MEDICARE

## 2023-05-24 VITALS
HEART RATE: 61 BPM | WEIGHT: 226.88 LBS | BODY MASS INDEX: 30.73 KG/M2 | SYSTOLIC BLOOD PRESSURE: 122 MMHG | HEIGHT: 72 IN | DIASTOLIC BLOOD PRESSURE: 72 MMHG

## 2023-05-24 DIAGNOSIS — R10.9 CHRONIC ABDOMINAL PAIN: Primary | ICD-10-CM

## 2023-05-24 DIAGNOSIS — R10.12 LUQ ABDOMINAL PAIN: ICD-10-CM

## 2023-05-24 DIAGNOSIS — G89.29 CHRONIC ABDOMINAL PAIN: Primary | ICD-10-CM

## 2023-05-24 DIAGNOSIS — R11.0 NAUSEA: ICD-10-CM

## 2023-05-24 DIAGNOSIS — Z12.11 COLON CANCER SCREENING: ICD-10-CM

## 2023-05-24 PROCEDURE — 99999 PR PBB SHADOW E&M-EST. PATIENT-LVL V: ICD-10-PCS | Mod: PBBFAC,,, | Performed by: NURSE PRACTITIONER

## 2023-05-24 PROCEDURE — 99215 OFFICE O/P EST HI 40 MIN: CPT | Mod: PBBFAC | Performed by: NURSE PRACTITIONER

## 2023-05-24 PROCEDURE — 99214 PR OFFICE/OUTPT VISIT, EST, LEVL IV, 30-39 MIN: ICD-10-PCS | Mod: S$PBB,,, | Performed by: NURSE PRACTITIONER

## 2023-05-24 PROCEDURE — 99214 OFFICE O/P EST MOD 30 MIN: CPT | Mod: S$PBB,,, | Performed by: NURSE PRACTITIONER

## 2023-05-24 PROCEDURE — 99999 PR PBB SHADOW E&M-EST. PATIENT-LVL V: CPT | Mod: PBBFAC,,, | Performed by: NURSE PRACTITIONER

## 2023-05-24 RX ORDER — PANTOPRAZOLE SODIUM 40 MG/1
40 TABLET, DELAYED RELEASE ORAL DAILY
Qty: 30 TABLET | Refills: 11 | Status: SHIPPED | OUTPATIENT
Start: 2023-05-24 | End: 2024-05-23

## 2023-05-24 NOTE — PROGRESS NOTES
Clinic Follow Up:  [facility] Follow Up Note    Reason for Follow Up:  The primary encounter diagnosis was Chronic abdominal pain. Diagnoses of Colon cancer screening, LUQ abdominal pain, and Nausea were also pertinent to this visit.    PCP: Scott Escobar       HPI:  This is a 48 y.o. female here for follow up.     4/14/23  She has a history of multiple abdominal surgeries starting with gastric bypass with complication in 2006 and subsequent revision.   She has chronic abdominal pain. She feels like her food gets stuck in her abdomen and doesn't digest.   She watches her diet which helps with the pain some. Her son is getting  at the end of May.   She has had multiple CT scans in the past. She has a reaction to PO contrast that is used for CT scans. She does not have issue with barium contrast.     5/24/23  She had UGI series which was unrevealing.   Xray without constipation   She is leaving for her son's wedding on Monday morning. She will be eating stuff she normally doesn't.   LUQ-- pain with eating. Still feels like food gets stuck over LUQ. Associated with nausea.     Review of Systems   Constitutional:  Negative for activity change and appetite change.        As per interval history above   Respiratory:  Negative for cough and shortness of breath.    Cardiovascular:  Negative for chest pain.   Gastrointestinal:  Positive for abdominal pain, nausea and vomiting. Negative for anal bleeding, blood in stool, constipation, diarrhea and rectal pain.   Skin:  Negative for color change and rash.     Medical History:  Past Medical History:   Diagnosis Date    Anemia 02/01/1990    Anxiety 08/01/2010    Depression 2008    Encounter for blood transfusion 01/15/2008    Endometriosis of uterus 05/01/1999    Fibroid 2010    Heart disease     Heart murmur 01/14/2008    Hypertension 2009    Marfan syndrome        Surgical History:   Past Surgical History:   Procedure Laterality Date     ABDOMINAL SURGERY  2006    several-had abscesses and complications after wt loss surgery; repair of small bowl injury; subsequent colon resection 19    APPENDECTOMY  2009    Rupture    BREAST BIOPSY Left 2019    CERVIX LESION DESTRUCTION      cryo for cervical dysplasia    CHOLECYSTECTOMY      COLONOSCOPY  2018    Normal    COLONOSCOPY N/A 2022    Procedure: COLONOSCOPY 10/29-card clearance received;  Surgeon: Ruben Chavez MD;  Location: [facility];  Service: Endoscopy;  Laterality: N/A;    CORONARY ARTERY BYPASS GRAFT (CABG)      DILATION AND CURETTAGE OF UTERUS      Miscarriage    LUNG REMOVAL, PARTIAL Right     2/3 of right lung removed; thought to be lung cancer, final path equals histoplasmosis    CHANDA-EN-Y PROCEDURE         Family History:   Family History   Problem Relation Age of Onset    Hypertension Mother     Rheum arthritis Mother     Cancer Mother     Diabetes Mother         Type 2 w/insulin    Lung cancer Mother         Lung cancer complications at 63    Heart attack Maternal Grandmother     Diabetes Maternal Grandmother         Brother Type 2    Hyperlipidemia Maternal Grandfather     Heart failure Maternal Grandfather     Heart failure Paternal Grandmother          from Heart Attack    Rheum arthritis Maternal Uncle         Blood sepsis    Cancer Maternal Aunt         Ovarian cancer    Cancer Maternal Aunt         Brain cancer    Migraines Brother        Social History:   Social History     Tobacco Use    Smoking status: Never    Smokeless tobacco: Never   Substance Use Topics    Alcohol use: Not Currently     Comment: Once every other month    Drug use: Never       Allergies:   Review of patient's allergies indicates:   Allergen Reactions    Aspirin Anaphylaxis     Tongue swelling      Cephalexin Anaphylaxis, Nausea And Vomiting and Hives    Iodine and iodide containing products Other (See Comments)     Pt is not allergic to contrast dye IV     Levofloxacin  Anaphylaxis    Sulfa (sulfonamide antibiotics) Anaphylaxis, Blisters, Dermatitis, Itching and Shortness Of Breath    Sulfamethoxazole-trimethoprim Anaphylaxis     3rd degree skin burning when given IV      Sulfasalazine Anaphylaxis    Tramadol Nausea And Vomiting    Adhesive Dermatitis, Hives and Itching     bandaids and adhesive on electrodes  bandaids and adhesive on electrodes, whelps      Adhesive tape-silicones Itching    Barium iodide Nausea And Vomiting     Oral only causes vomiting       Home Medications:  Current Outpatient Medications on File Prior to Visit   Medication Sig Dispense Refill    bumetanide (BUMEX) 1 MG tablet Take 1 tablet (1 mg total) by mouth once daily. 90 tablet 3    calcium carbonate (OS-ESE) 600 mg calcium (1,500 mg) Tab Take 600 mg by mouth.      cyanocobalamin 1,000 mcg/mL injection Inject 1 mL (1,000 mcg total) into the muscle every 30 days. 3 mL 3    ergocalciferol, vitamin D2, (VITAMIN D ORAL)       ferrous sulfate (FEOSOL) 325 mg (65 mg iron) Tab tablet Take 325 mg by mouth.      gabapentin (NEURONTIN) 600 MG tablet Take 1 tablet (600 mg total) by mouth 3 (three) times daily. 270 tablet 3    iron,carb/vit C/vit B12/folic (IRON 100 PLUS ORAL)       ondansetron (ZOFRAN-ODT) 8 MG TbDL Take 1 tablet (8 mg total) by mouth 2 (two) times daily. 20 tablet 0    potassium chloride (KLOR-CON) 20 mEq Pack Take 40 mEq by mouth once daily. 90 packet 3    promethazine (PHENERGAN) 12.5 MG Supp Place 1 suppository (12.5 mg total) rectally every 6 (six) hours as needed. 10 suppository 0    promethazine (PHENERGAN) 25 MG tablet Take 1 tablet (25 mg total) by mouth every 6 (six) hours as needed for Nausea. 20 tablet 0    spironolactone (ALDACTONE) 25 MG tablet Take 1 tablet (25 mg total) by mouth once daily. 90 tablet 3    tiZANidine (ZANAFLEX) 4 MG tablet Take 2 tablets (8 mg total) by mouth 3 (three) times daily. 540 tablet 3    traZODone (DESYREL) 150 MG tablet Take 1 tablet (150 mg total) by  "mouth every evening. 90 tablet 3    venlafaxine (EFFEXOR-XR) 75 MG 24 hr capsule Take 1 capsule (75 mg total) by mouth once daily. 90 capsule 3     No current facility-administered medications on file prior to visit.       /72 (BP Location: Left arm, Patient Position: Sitting, BP Method: Large (Manual))   Pulse 61   Ht 6' 4" (1.93 m)   Wt 102.9 kg (226 lb 13.7 oz)   BMI 27.61 kg/m²   Body mass index is 27.61 kg/m².  Physical Exam  Constitutional:       General: She is not in acute distress.  HENT:      Head: Normocephalic and atraumatic.   Eyes:      General: No scleral icterus.     Conjunctiva/sclera: Conjunctivae normal.   Cardiovascular:      Rate and Rhythm: Normal rate and regular rhythm.      Heart sounds: No murmur heard.  Pulmonary:      Effort: Pulmonary effort is normal. No respiratory distress.      Breath sounds: Normal breath sounds. No wheezing.   Abdominal:      General: Abdomen is flat. Bowel sounds are normal.      Palpations: Abdomen is soft.      Tenderness: There is no abdominal tenderness.   Skin:     General: Skin is warm and dry.   Neurological:      General: No focal deficit present.      Mental Status: She is alert and oriented to person, place, and time.      Cranial Nerves: No cranial nerve deficit.   Psychiatric:         Mood and Affect: Mood normal.         Judgment: Judgment normal.       Labs: Pertinent labs reviewed.  CRC Screening: due-- poor prep     Assessment:   1. Chronic abdominal pain    2. Colon cancer screening    3. LUQ abdominal pain    4. Nausea    Patient with chronic abdominal pain. I still feel her symptoms are most likely 2/2 adhesions but will continue with GI workup.     Recommendations:   - start pantoprazole   - EGD for further evaluation   - will have her repeat colonoscopy at the same time. Poor prep for last one. Will have her do extended bowel prep. See AVS for details. Using suprep because she needs more small volume prep-- has vomiting with " Nulytely.   - A referral has been placed for endoscopy procedure scheduling and pre-admission testing (PAT) appointment has been scheduled. -- extended bowel prep added to scheduling notes.     Chronic abdominal pain  -     Case Request Endoscopy: EGD (ESOPHAGOGASTRODUODENOSCOPY), COLONOSCOPY  -     Ambulatory referral/consult to Endo Procedure ; Future; Expected date: 05/25/2023    Colon cancer screening  -     Case Request Endoscopy: EGD (ESOPHAGOGASTRODUODENOSCOPY), COLONOSCOPY  -     Ambulatory referral/consult to Endo Procedure ; Future; Expected date: 05/25/2023    LUQ abdominal pain    Nausea  -     Case Request Endoscopy: EGD (ESOPHAGOGASTRODUODENOSCOPY), COLONOSCOPY  -     Ambulatory referral/consult to Endo Procedure ; Future; Expected date: 05/25/2023    Other orders  -     pantoprazole (PROTONIX) 40 MG tablet; Take 1 tablet (40 mg total) by mouth once daily.  Dispense: 30 tablet; Refill: 11    Return to Clinic:  Follow up to be determined after results/ procedure(s).    Thank you for the opportunity to participate in the care of this patient.  LILA Holt

## 2023-05-24 NOTE — PATIENT INSTRUCTIONS
Extended Bowl Prep   You will start your bowel prep 2 days before colonoscopy instead of 1 day before.   2 days before colonoscopy you will begin your clear liquid diet. You will drink one bottle of magnesium citrate that evening.  Magnesium citrate can be purchased over the counter at your local drug store/pharmacy.   1 day before colonoscopy, you will continue the clear liquid diet and take the regular prescribed bowel prep as directed.

## 2023-06-06 ENCOUNTER — HOSPITAL ENCOUNTER (OUTPATIENT)
Dept: PREADMISSION TESTING | Facility: HOSPITAL | Age: 48
Discharge: HOME | End: 2023-06-06
Attending: INTERNAL MEDICINE
Payer: MEDICARE

## 2023-06-06 DIAGNOSIS — R10.9 CHRONIC ABDOMINAL PAIN: ICD-10-CM

## 2023-06-06 DIAGNOSIS — Z12.11 COLON CANCER SCREENING: ICD-10-CM

## 2023-06-06 DIAGNOSIS — G89.29 CHRONIC ABDOMINAL PAIN: ICD-10-CM

## 2023-06-06 DIAGNOSIS — R11.0 NAUSEA: ICD-10-CM

## 2023-06-06 RX ORDER — SODIUM, POTASSIUM,MAG SULFATES 17.5-3.13G
1 SOLUTION, RECONSTITUTED, ORAL ORAL DAILY
Qty: 1 KIT | Refills: 0 | Status: SHIPPED | OUTPATIENT
Start: 2023-06-06 | End: 2023-06-08

## 2023-06-09 ENCOUNTER — OFFICE VISIT (OUTPATIENT)
Dept: PSYCHIATRY | Facility: CLINIC | Age: 48
End: 2023-06-09
Payer: MEDICARE

## 2023-06-09 DIAGNOSIS — F32.A ANXIETY AND DEPRESSION: Primary | ICD-10-CM

## 2023-06-09 DIAGNOSIS — T74.91XA VICTIM OF INTIMATE PARTNER ABUSE: ICD-10-CM

## 2023-06-09 DIAGNOSIS — F41.9 ANXIETY AND DEPRESSION: Primary | ICD-10-CM

## 2023-06-09 PROCEDURE — 90834 PR PSYCHOTHERAPY W/PATIENT, 45 MIN: ICD-10-PCS | Mod: 95,,, | Performed by: SOCIAL WORKER

## 2023-06-09 PROCEDURE — 90834 PSYTX W PT 45 MINUTES: CPT | Mod: 95,,, | Performed by: SOCIAL WORKER
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
## 2023-06-22 ENCOUNTER — PATIENT MESSAGE (OUTPATIENT)
Dept: PSYCHIATRY | Facility: CLINIC | Age: 48
End: 2023-06-22
Payer: MEDICARE

## 2023-07-10 PROBLEM — G89.29 CHRONIC ABDOMINAL PAIN: Status: ACTIVE | Noted: 2023-07-10

## 2023-07-10 PROBLEM — R10.9 CHRONIC ABDOMINAL PAIN: Status: ACTIVE | Noted: 2023-07-10

## 2023-07-11 ENCOUNTER — HOSPITAL ENCOUNTER (OUTPATIENT)
Facility: HOSPITAL | Age: 48
Discharge: HOME | End: 2023-07-11
Attending: INTERNAL MEDICINE | Admitting: INTERNAL MEDICINE
Payer: MEDICARE

## 2023-07-11 ENCOUNTER — ANESTHESIA (OUTPATIENT)
Dept: ENDOSCOPY | Facility: HOSPITAL | Age: 48
End: 2023-07-11
Payer: MEDICARE

## 2023-07-11 ENCOUNTER — ANESTHESIA EVENT (OUTPATIENT)
Dept: ENDOSCOPY | Facility: HOSPITAL | Age: 48
End: 2023-07-11
Payer: MEDICARE

## 2023-07-11 DIAGNOSIS — G89.29 CHRONIC ABDOMINAL PAIN: ICD-10-CM

## 2023-07-11 DIAGNOSIS — R10.9 CHRONIC ABDOMINAL PAIN: ICD-10-CM

## 2023-07-11 DIAGNOSIS — Z12.11 COLON CANCER SCREENING: Primary | ICD-10-CM

## 2023-07-11 LAB
B-HCG UR QL: NEGATIVE
CTP QC/QA: YES

## 2023-07-11 PROCEDURE — 27201089 HC SNARE, DISP (ANY): Performed by: INTERNAL MEDICINE

## 2023-07-11 PROCEDURE — 43239 EGD BIOPSY SINGLE/MULTIPLE: CPT | Performed by: INTERNAL MEDICINE

## 2023-07-11 PROCEDURE — 88305 TISSUE EXAM BY PATHOLOGIST: ICD-10-PCS | Mod: 26,,, | Performed by: PATHOLOGY

## 2023-07-11 PROCEDURE — 88305 TISSUE EXAM BY PATHOLOGIST: CPT | Mod: 59 | Performed by: PATHOLOGY

## 2023-07-11 PROCEDURE — 37000008 HC ANESTHESIA 1ST 15 MINUTES: Performed by: INTERNAL MEDICINE

## 2023-07-11 PROCEDURE — 37000009 HC ANESTHESIA EA ADD 15 MINS: Performed by: INTERNAL MEDICINE

## 2023-07-11 PROCEDURE — 43239 EGD BIOPSY SINGLE/MULTIPLE: CPT | Mod: ,,, | Performed by: INTERNAL MEDICINE

## 2023-07-11 PROCEDURE — 45385 PR COLONOSCOPY,REMV LESN,SNARE: ICD-10-PCS | Mod: PT,22,, | Performed by: INTERNAL MEDICINE

## 2023-07-11 PROCEDURE — 43239 PR EGD, FLEX, W/BIOPSY, SGL/MULTI: ICD-10-PCS | Mod: ,,, | Performed by: INTERNAL MEDICINE

## 2023-07-11 PROCEDURE — 45385 COLONOSCOPY W/LESION REMOVAL: CPT | Mod: PT,22,, | Performed by: INTERNAL MEDICINE

## 2023-07-11 PROCEDURE — 45385 COLONOSCOPY W/LESION REMOVAL: CPT | Mod: PT | Performed by: INTERNAL MEDICINE

## 2023-07-11 PROCEDURE — 63600175 PHARM REV CODE 636 W HCPCS: Performed by: NURSE ANESTHETIST, CERTIFIED REGISTERED

## 2023-07-11 PROCEDURE — 81025 URINE PREGNANCY TEST: CPT | Performed by: INTERNAL MEDICINE

## 2023-07-11 PROCEDURE — 25000003 PHARM REV CODE 250: Performed by: NURSE ANESTHETIST, CERTIFIED REGISTERED

## 2023-07-11 PROCEDURE — 27201012 HC FORCEPS, HOT/COLD, DISP: Performed by: INTERNAL MEDICINE

## 2023-07-11 PROCEDURE — 88305 TISSUE EXAM BY PATHOLOGIST: CPT | Mod: 26,,, | Performed by: PATHOLOGY

## 2023-07-11 PROCEDURE — 63600175 PHARM REV CODE 636 W HCPCS: Performed by: INTERNAL MEDICINE

## 2023-07-11 RX ORDER — PROPOFOL 10 MG/ML
VIAL (ML) INTRAVENOUS
Status: DISCONTINUED | OUTPATIENT
Start: 2023-07-11 | End: 2023-07-11

## 2023-07-11 RX ORDER — LIDOCAINE HYDROCHLORIDE 10 MG/ML
INJECTION, SOLUTION EPIDURAL; INFILTRATION; INTRACAUDAL; PERINEURAL
Status: DISCONTINUED | OUTPATIENT
Start: 2023-07-11 | End: 2023-07-11

## 2023-07-11 RX ORDER — SODIUM CHLORIDE, SODIUM LACTATE, POTASSIUM CHLORIDE, CALCIUM CHLORIDE 600; 310; 30; 20 MG/100ML; MG/100ML; MG/100ML; MG/100ML
INJECTION, SOLUTION INTRAVENOUS CONTINUOUS PRN
Status: DISCONTINUED | OUTPATIENT
Start: 2023-07-11 | End: 2023-07-11

## 2023-07-11 RX ORDER — HEPARIN 100 UNIT/ML
5 SYRINGE INTRAVENOUS ONCE
Status: COMPLETED | OUTPATIENT
Start: 2023-07-11 | End: 2023-07-11

## 2023-07-11 RX ADMIN — PROPOFOL 40 MG: 10 INJECTION, EMULSION INTRAVENOUS at 11:07

## 2023-07-11 RX ADMIN — PROPOFOL 100 MG: 10 INJECTION, EMULSION INTRAVENOUS at 11:07

## 2023-07-11 RX ADMIN — HEPARIN 500 UNITS: 100 SYRINGE at 12:07

## 2023-07-11 RX ADMIN — PROPOFOL 20 MG: 10 INJECTION, EMULSION INTRAVENOUS at 11:07

## 2023-07-11 RX ADMIN — PROPOFOL 30 MG: 10 INJECTION, EMULSION INTRAVENOUS at 11:07

## 2023-07-11 RX ADMIN — PROPOFOL 50 MG: 10 INJECTION, EMULSION INTRAVENOUS at 11:07

## 2023-07-11 RX ADMIN — GLYCOPYRROLATE 0.2 MG: 0.2 INJECTION, SOLUTION INTRAMUSCULAR; INTRAVITREAL at 11:07

## 2023-07-11 RX ADMIN — LIDOCAINE HYDROCHLORIDE 50 MG: 10 SOLUTION INTRAVENOUS at 11:07

## 2023-07-11 RX ADMIN — SODIUM CHLORIDE, SODIUM LACTATE, POTASSIUM CHLORIDE, AND CALCIUM CHLORIDE: .6; .31; .03; .02 INJECTION, SOLUTION INTRAVENOUS at 11:07

## 2023-07-11 NOTE — PLAN OF CARE
Deaccessed port; good blood return noted; flushes well. Site without bleeding, redness or drainage. Covered site with sterile gauze and tegaderm per patient's request. Tolerated well.

## 2023-07-11 NOTE — TRANSFER OF CARE
"Anesthesia Transfer of Care Note    Patient: Viri Carlson    Procedure(s) Performed: Procedure(s) (LRB):  EGD (ESOPHAGOGASTRODUODENOSCOPY) (N/A)  COLONOSCOPY (N/A)    Patient location: PACU    Anesthesia Type: MAC    Transport from OR: Transported from OR on room air with adequate spontaneous ventilation    Post pain: adequate analgesia    Post assessment: no apparent anesthetic complications and tolerated procedure well    Post vital signs: stable    Level of consciousness: sedated    Nausea/Vomiting: no nausea/vomiting    Complications: none    Transfer of care protocol was followed      Last vitals:   Visit Vitals  BP (!) 126/55   Pulse (!) 50   Temp 36.7 °C (98.1 °F)   Resp 20   Ht 6' 4" (1.93 m)   Wt 96.6 kg (213 lb)   SpO2 99%   Breastfeeding No   BMI 25.93 kg/m²     "

## 2023-07-11 NOTE — H&P
PRE PROCEDURE H&P    Patient Name: Viri Carlson  MRN: [id number]  : 1975  Date of Procedure:  2023  Referring Physician: Lashonda Guerrero NP  Primary Physician: Scott Escobar MD  Procedure Physician: Angy Ellis MD       Planned Procedure: Colonoscopy and EGD  Diagnosis: screening for colon cancer  and chronic abdominal pain    Chief Complaint: Same as above    HPI: Patient is an 48 y.o. female is here for the above. History of multiple abdominal surgeries including appy, alma and gastric bypass with complication in  and subsequent revision. She underwent lysis of adhesions in 2019 and continues to have abdominal pain today. Repeat CT scans have been unrevealing. Seen by Lashonda Guerrero in may 2023 who recommended endoscopic evaluation. Last EGD 10 years ago also for abdominal pain. No previous colonoscopy. No Fhx of CRC, no blood thinners.     Last colonoscopy: none  Family history: none  Anticoagulation: mariluz    Past Medical History:   Past Medical History:   Diagnosis Date    Anemia 1990    Anxiety 2010    Depression     Encounter for blood transfusion 01/15/2008    Endometriosis of uterus 1999    Fibroid     Heart disease     Heart murmur 2008    Hypertension 2009    Marfan syndrome         Past Surgical History:  Past Surgical History:   Procedure Laterality Date    ABDOMINAL SURGERY  2006    several-had abscesses and complications after wt loss surgery; repair of small bowl injury; subsequent colon resection 19    APPENDECTOMY  2009    Rupture    BREAST BIOPSY Left 2019    CERVIX LESION DESTRUCTION      cryo for cervical dysplasia    CHOLECYSTECTOMY      COLON SURGERY      COLONOSCOPY  2018    Normal    COLONOSCOPY N/A 2022    Procedure: COLONOSCOPY 10/29-card clearance received;  Surgeon: Ruben Chavez MD;  Location: [facility];  Service: Endoscopy;  Laterality: N/A;    CORONARY ARTERY BYPASS GRAFT (CABG)       DILATION AND CURETTAGE OF UTERUS  2008    Miscarriage    LUNG REMOVAL, PARTIAL Right     2/3 of right lung removed; thought to be lung cancer, final path equals histoplasmosis    CHANDA-EN-Y PROCEDURE          Home Medications:  Prior to Admission medications    Medication Sig Start Date End Date Taking? Authorizing Provider   bumetanide (BUMEX) 1 MG tablet Take 1 tablet (1 mg total) by mouth once daily. 3/9/23  Yes Scott Escobar MD   calcium carbonate (OS-ESE) 600 mg calcium (1,500 mg) Tab Take 600 mg by mouth.   Yes Historical Provider   cyanocobalamin 1,000 mcg/mL injection Inject 1 mL (1,000 mcg total) into the muscle every 30 days. 3/9/23  Yes Scott Escobar MD   ergocalciferol, vitamin D2, (VITAMIN D ORAL)    Yes Historical Provider   ferrous sulfate (FEOSOL) 325 mg (65 mg iron) Tab tablet Take 325 mg by mouth.   Yes Historical Provider   gabapentin (NEURONTIN) 600 MG tablet Take 1 tablet (600 mg total) by mouth 3 (three) times daily. 3/9/23  Yes Scott Escobar MD   iron,carb/vit C/vit B12/folic (IRON 100 PLUS ORAL)    Yes Historical Provider   ondansetron (ZOFRAN-ODT) 8 MG TbDL Take 1 tablet (8 mg total) by mouth 2 (two) times daily. 4/14/23  Yes Lashonda Guerrero NP   pantoprazole (PROTONIX) 40 MG tablet Take 1 tablet (40 mg total) by mouth once daily. 5/24/23 5/23/24 Yes Lashonda Guerrero NP   potassium chloride (KLOR-CON) 20 mEq Pack Take 40 mEq by mouth once daily. 3/9/23  Yes Scott Escobar MD   promethazine (PHENERGAN) 25 MG tablet Take 1 tablet (25 mg total) by mouth every 6 (six) hours as needed for Nausea. 4/14/23  Yes Lashonda Guerrero NP   spironolactone (ALDACTONE) 25 MG tablet Take 1 tablet (25 mg total) by mouth once daily. 3/9/23  Yes Scott Escobar MD   tiZANidine (ZANAFLEX) 4 MG tablet Take 2 tablets (8 mg total) by mouth 3 (three) times daily. 3/9/23  Yes Scott Escobar MD   traZODone (DESYREL) 150 MG tablet Take 1 tablet (150 mg total) by mouth every evening.  3/9/23 3/8/24 Yes Scott Escobar MD   venlafaxine (EFFEXOR-XR) 75 MG 24 hr capsule Take 1 capsule (75 mg total) by mouth once daily. 3/9/23 3/8/24 Yes Scott Escobar MD   promethazine (PHENERGAN) 12.5 MG Supp Place 1 suppository (12.5 mg total) rectally every 6 (six) hours as needed. 4/14/23   Lashonda Guerrero NP        Allergies:  Review of patient's allergies indicates:   Allergen Reactions    Aspirin Anaphylaxis     Tongue swelling      Cephalexin Anaphylaxis, Nausea And Vomiting and Hives    Iodine and iodide containing products Other (See Comments)     Pt is not allergic to contrast dye IV     Levofloxacin Anaphylaxis    Sulfa (sulfonamide antibiotics) Anaphylaxis, Blisters, Dermatitis, Itching and Shortness Of Breath    Sulfamethoxazole-trimethoprim Anaphylaxis     3rd degree skin burning when given IV      Sulfasalazine Anaphylaxis    Tramadol Nausea And Vomiting    Adhesive Dermatitis, Hives and Itching     bandaids and adhesive on electrodes  bandaids and adhesive on electrodes, whelps      Adhesive tape-silicones Itching    Barium iodide Nausea And Vomiting     Oral only causes vomiting        Social History:   Social History     Socioeconomic History    Marital status: Other   Tobacco Use    Smoking status: Never    Smokeless tobacco: Never    Tobacco comments:     Mother was a amoker.  I have used chewing tobacco   Substance and Sexual Activity    Alcohol use: Not Currently     Comment: occasion    Drug use: Never    Sexual activity: Not Currently     Partners: Male     Birth control/protection: Abstinence, None     Comment:         Family History:  Family History   Problem Relation Age of Onset    Hypertension Mother     Rheum arthritis Mother     Cancer Mother     Diabetes Mother         Type 2 w/insulin    Lung cancer Mother         Lung cancer complications at 63    Heart attack Maternal Grandmother     Diabetes Maternal Grandmother         Brother Type 2    Hyperlipidemia Maternal  "Grandfather     Heart failure Maternal Grandfather     Heart failure Paternal Grandmother          from Heart Attack    Rheum arthritis Maternal Uncle         Blood sepsis    Cancer Maternal Aunt         Ovarian cancer    Cancer Maternal Aunt         Brain cancer    Migraines Brother     Diabetes Brother         Oral meds       ROS: No acute cardiac events, no acute respiratory complaints.     Physical Exam (all patients):    BP (!) 126/55   Pulse (!) 50   Temp 98.1 °F (36.7 °C)   Resp 20   Ht 6' 4" (1.93 m)   Wt 96.6 kg (213 lb)   SpO2 99%   Breastfeeding No   BMI 25.93 kg/m²   Lungs: Clear to auscultation bilaterally, respirations unlabored  Heart: Regular rate and rhythm, S1 and S2 normal, no obvious murmurs  Abdomen:         Soft, non-tender, bowel sounds normal, no masses, no organomegaly    Lab Results   Component Value Date    WBC 6.32 2023    MCV 82 2023    RDW 12.8 2023     2023     (H) 2023    HGBA1C 6.0 (H) 2022    BUN 9 2023     2023    K 3.7 2023     2023        SEDATION PLAN: per anesthesia      History reviewed, vital signs satisfactory, cardiopulmonary status satisfactory, sedation options, risks and plans have been discussed with the patient  All their questions were answered and the patient agrees to the sedation procedures as planned and the patient is deemed an appropriate candidate for the sedation as planned.    The risks, benefits and alternatives of the procedure were discussed with the patient in detail. This discussion was had in the presence of  endoscopy staff. The risks include, risks of adverse reaction to sedation requiring the use of reversal agents, bleeding requiring blood transfusion, perforation requiring surgical intervention and technical failure. Other risks include aspiration leading to respiratory distress and respiratory failure resulting in endotracheal intubation and " mechanical ventilation including death. If anesthesia is being utilized for this procedure, it is up to the anesthesiologist to determine airway safety including elective endotracheal intubation. Questions were answered, they agree to proceed. There was no language barriers.      Procedure explained to patient, informed consent obtained and placed in chart.    Angy Ellis  7/11/2023  11:09 AM

## 2023-07-11 NOTE — ANESTHESIA POSTPROCEDURE EVALUATION
Anesthesia Post Evaluation    Patient: Viri Carlson    Procedure(s) Performed: Procedure(s) (LRB):  EGD (ESOPHAGOGASTRODUODENOSCOPY) (N/A)  COLONOSCOPY (N/A)    Final Anesthesia Type: MAC      Patient location during evaluation: PACU  Patient participation: No - Unable to Participate, Sedation  Level of consciousness: sedated  Post-procedure vital signs: reviewed and stable  Pain management: adequate  Airway patency: patent    PONV status at discharge: No PONV  Anesthetic complications: no      Cardiovascular status: blood pressure returned to baseline and hemodynamically stable  Respiratory status: unassisted and spontaneous ventilation  Hydration status: euvolemic  Follow-up not needed.            No case tracking events are documented in the log.      Pain/Madeline Score: No data recorded

## 2023-07-11 NOTE — PROVATION PATIENT INSTRUCTIONS
Discharge Summary/Instructions after an Endoscopic Procedure  Patient Name: Viri Carlson  Patient MRN: [id number]  Patient YOB: 1975 Tuesday, July 11, 2023 Angy Ellis MD  Dear patient,  As a result of recent federal legislation (The Federal Cures Act), you may   receive lab or pathology results from your procedure in your [facility]   account before your physician is able to contact you. Your physician or   their representative will relay the results to you with their   recommendations at their soonest availability.  Thank you,  RESTRICTIONS:  During your procedure today, you received medications for sedation.  These   medications may affect your judgment, balance and coordination.  Therefore,   for 24 hours, you have the following restrictions:   - DO NOT drive a car, operate machinery, make legal/financial decisions,   sign important papers or drink alcohol.    ACTIVITY:  Today: no heavy lifting, straining or running due to procedural   sedation/anesthesia.  The following day: return to full activity including work.  DIET:  Eat and drink normally unless instructed otherwise.     TREATMENT FOR COMMON SIDE EFFECTS:  - Mild abdominal pain, nausea, belching, bloating or excessive gas:  rest,   eat lightly and use a heating pad.  - Sore Throat: treat with throat lozenges and/or gargle with warm salt   water.  - Because air was used during the procedure, expelling large amounts of air   from your rectum or belching is normal.  - If a bowel prep was taken, you may not have a bowel movement for 1-3 days.    This is normal.  SYMPTOMS TO WATCH FOR AND REPORT TO YOUR PHYSICIAN:  1. Abdominal pain or bloating, other than gas cramps.  2. Chest pain.  3. Back pain.  4. Signs of infection such as: chills or fever occurring within 24 hours   after the procedure.  5. Rectal bleeding, which would show as bright red, maroon, or black stools.   (A tablespoon of blood from the rectum is not serious, especially if    hemorrhoids are present.)  6. Vomiting.  7. Weakness or dizziness.  GO DIRECTLY TO THE NEAREST EMERGENCY ROOM IF YOU HAVE ANY OF THE FOLLOWING:      Difficulty breathing              Chills and/or fever over 101 F   Persistent vomiting and/or vomiting blood   Severe abdominal pain   Severe chest pain   Black, tarry stools   Bleeding- more than one tablespoon   Any other symptom or condition that you feel may need urgent attention  Your doctor recommends these additional instructions:  If any biopsies were taken, your doctors clinic will contact you in 1 to 2   weeks with any results.  - Discharge patient to home (with escort).   - Resume previous diet.   - Continue present medications.   - Reduce polypharmacy.  - No cause for abdominal pain found. Suspect adhesions vs constipation vs   polypharmacy  - Consider pain clinic referral given extensive imaging unrevealing in past   and due to negative endoscopic evaluation  - The findings and recommendations were discussed with the patient.   - Return to primary care physician.   - Repeat colonoscopy in 5 years for surveillance based on pathology   results.  For questions, problems or results please call your physician Angy Ellis MD at Work:  [phone]  If you have any questions about the above instructions, call the GI   department at [phone] or call the endoscopy unit at [phone]   from 7am until 3 pm.  [facility] [place], EMERGENCY ROOM PHONE NUMBER:   [phone]  IF A COMPLICATION OR EMERGENCY SITUATION ARISES AND YOU ARE UNABLE TO REACH   YOUR PHYSICIAN - GO DIRECTLY TO THE EMERGENCY ROOM.  I have read or have had read to me these discharge instructions for my   procedure and have received a written copy.  I understand these   instructions and will follow-up with my physician if I have any questions.     __________________________________       _____________________________________  Nurse Signature                                           Patient/Designated   Responsible Party Signature  MD Angy Landin MD  7/11/2023 12:00:13 PM  This report has been verified and signed electronically.  Dear patient,  As a result of recent federal legislation (The Federal Cures Act), you may   receive lab or pathology results from your procedure in your [facility]   account before your physician is able to contact you. Your physician or   their representative will relay the results to you with their   recommendations at their soonest availability.  Thank you,  PROVATION

## 2023-07-11 NOTE — ANESTHESIA PREPROCEDURE EVALUATION
07/11/2023  Viri Carlson is a 48 y.o., female.      Pre-op Assessment    I have reviewed the Patient Summary Reports.     I have reviewed the Nursing Notes. I have reviewed the NPO Status.   I have reviewed the Medications.     Review of Systems  Anesthesia Hx:  No problems with previous Anesthesia  Denies Family Hx of Anesthesia complications.   Denies Personal Hx of Anesthesia complications.   Social:  No Alcohol Use, Non-Smoker    Hematology/Oncology:     Oncology Normal    -- Anemia:   Cardiovascular:   Hypertension Valvular problems/Murmurs Denies MI. CAD   CABG/stent   Denies CHF. ECG has been reviewed. ekg 1/2023:  Normal sinus rhythm 71  Nonspecific T wave abnormality   Abnormal ECG   When compared with ECG of 22-AUG-2022 10:07,   Nonspecific T wave abnormality, worse in Inferior leads    Echo 3/2022:  ? The left ventricle is normal in size with normal systolic function.  ? The estimated ejection fraction is 65%.  ? Mild left atrial enlargement.  ? Normal right ventricular size with normal right ventricular systolic function.  ? Mild mitral regurgitation.  ? Mild to moderate tricuspid regurgitation.  ? Normal central venous pressure (3 mmHg).  ? The estimated PA systolic pressure is 36 mmHg   Pulmonary:   Denies COPD.  Denies Asthma. Sleep Apnea History of pneumonectomy   Renal/:  Renal/ Normal     Hepatic/GI:   Bowel Prep. GERD Denies Liver Disease. Denies Hepatitis.    Musculoskeletal:   Marfan syndrome   Neurological:   Denies CVA. Denies Seizures.    Endocrine:   Denies Diabetes. Denies Hypothyroidism. Denies Hyperthyroidism. Pre-diabetes   Psych:   anxiety depression Psychophysiological insomnia         Physical Exam  General: Well nourished    Airway:  Mallampati: II   Mouth Opening: Normal    Dental:  Dentures    Chest/Lungs:  Clear to auscultation, Normal Respiratory  Rate    Heart:  Rate: Normal  Rhythm: Regular Rhythm        Anesthesia Plan  Type of Anesthesia, risks & benefits discussed:    Anesthesia Type: MAC  Intra-op Monitoring Plan: Standard ASA Monitors  Induction:  IV  Informed Consent: Informed consent signed with the Patient and all parties understand the risks and agree with anesthesia plan.  All questions answered.   ASA Score: 2  Day of Surgery Review of History & Physical: H&P Update referred to the surgeon/provider.    Ready For Surgery From Anesthesia Perspective.     .

## 2023-07-11 NOTE — PROVATION PATIENT INSTRUCTIONS
Discharge Summary/Instructions after an Endoscopic Procedure  Patient Name: Viri Carlson  Patient MRN: [id number]  Patient YOB: 1975 Tuesday, July 11, 2023 Angy Ellis MD  Dear patient,  As a result of recent federal legislation (The Federal Cures Act), you may   receive lab or pathology results from your procedure in your [facility]   account before your physician is able to contact you. Your physician or   their representative will relay the results to you with their   recommendations at their soonest availability.  Thank you,  RESTRICTIONS:  During your procedure today, you received medications for sedation.  These   medications may affect your judgment, balance and coordination.  Therefore,   for 24 hours, you have the following restrictions:   - DO NOT drive a car, operate machinery, make legal/financial decisions,   sign important papers or drink alcohol.    ACTIVITY:  Today: no heavy lifting, straining or running due to procedural   sedation/anesthesia.  The following day: return to full activity including work.  DIET:  Eat and drink normally unless instructed otherwise.     TREATMENT FOR COMMON SIDE EFFECTS:  - Mild abdominal pain, nausea, belching, bloating or excessive gas:  rest,   eat lightly and use a heating pad.  - Sore Throat: treat with throat lozenges and/or gargle with warm salt   water.  - Because air was used during the procedure, expelling large amounts of air   from your rectum or belching is normal.  - If a bowel prep was taken, you may not have a bowel movement for 1-3 days.    This is normal.  SYMPTOMS TO WATCH FOR AND REPORT TO YOUR PHYSICIAN:  1. Abdominal pain or bloating, other than gas cramps.  2. Chest pain.  3. Back pain.  4. Signs of infection such as: chills or fever occurring within 24 hours   after the procedure.  5. Rectal bleeding, which would show as bright red, maroon, or black stools.   (A tablespoon of blood from the rectum is not serious, especially if    hemorrhoids are present.)  6. Vomiting.  7. Weakness or dizziness.  GO DIRECTLY TO THE NEAREST EMERGENCY ROOM IF YOU HAVE ANY OF THE FOLLOWING:      Difficulty breathing              Chills and/or fever over 101 F   Persistent vomiting and/or vomiting blood   Severe abdominal pain   Severe chest pain   Black, tarry stools   Bleeding- more than one tablespoon   Any other symptom or condition that you feel may need urgent attention  Your doctor recommends these additional instructions:  If any biopsies were taken, your doctors clinic will contact you in 1 to 2   weeks with any results.  - Discharge patient to home after colonoscopy.   - Resume previous diet.   - Continue present medications.   - Continue Pantoprazole at current dose.  - Await pathology results.   - Proceed with colonoscopy.  For questions, problems or results please call your physician Angy Ellis MD at Work:  [phone]  If you have any questions about the above instructions, call the GI   department at [phone] or call the endoscopy unit at [phone]   from 7am until 3 pm.  [facility] [place], EMERGENCY ROOM PHONE NUMBER:   [phone]  IF A COMPLICATION OR EMERGENCY SITUATION ARISES AND YOU ARE UNABLE TO REACH   YOUR PHYSICIAN - GO DIRECTLY TO THE EMERGENCY ROOM.  I have read or have had read to me these discharge instructions for my   procedure and have received a written copy.  I understand these   instructions and will follow-up with my physician if I have any questions.     __________________________________       _____________________________________  Nurse Signature                                          Patient/Designated   Responsible Party Signature  MD Angy Landin MD  7/11/2023 11:53:27 AM  This report has been verified and signed electronically.  Dear patient,  As a result of recent federal legislation (The Federal Cures Act), you may   receive lab or pathology results from  your procedure in your [facility]   account before your physician is able to contact you. Your physician or   their representative will relay the results to you with their   recommendations at their soonest availability.  Thank you,  PROVATION

## 2023-07-12 VITALS
SYSTOLIC BLOOD PRESSURE: 105 MMHG | RESPIRATION RATE: 18 BRPM | BODY MASS INDEX: 28.85 KG/M2 | OXYGEN SATURATION: 98 % | HEIGHT: 72 IN | WEIGHT: 213 LBS | TEMPERATURE: 98 F | HEART RATE: 72 BPM | DIASTOLIC BLOOD PRESSURE: 59 MMHG

## 2023-07-12 LAB
FINAL PATHOLOGIC DIAGNOSIS: NORMAL
GROSS: NORMAL
Lab: NORMAL

## 2023-07-20 NOTE — PROGRESS NOTES
The biopsies of your gastric pouch are normal. There is no infection. The polyp removed was precancerous also known as an adenoma. It was completely removed. Guidelines recommend a repeat colonoscopy in 5 years. We will send you a reminder as the time nears.    No cause for your abdominal pain was found. Given your extensive abdominal surgeries and multiple medicines, I recommend working with your primary care provider to reduce the number of medicines you are on. Often multiple medicines can lead to GI upset. Also, adhesions from previous surgeries can lead to chronic abdominal pain. I recommend you be referred to a pain specialist and be considered for trigger point injections.     Thanks for trusting us with your healthcare needs and using [facility].     Sincerely,    Angy Ellis M.D.

## 2023-07-26 ENCOUNTER — PATIENT MESSAGE (OUTPATIENT)
Dept: GASTROENTEROLOGY | Facility: CLINIC | Age: 48
End: 2023-07-26
Payer: MEDICARE

## 2023-07-26 ENCOUNTER — OFFICE VISIT (OUTPATIENT)
Dept: PRIMARY CARE CLINIC | Facility: CLINIC | Age: 48
End: 2023-07-26
Payer: MEDICARE

## 2023-07-26 ENCOUNTER — LAB VISIT (OUTPATIENT)
Dept: LAB | Facility: HOSPITAL | Age: 48
End: 2023-07-26
Attending: FAMILY MEDICINE
Payer: MEDICARE

## 2023-07-26 VITALS
WEIGHT: 215.19 LBS | HEIGHT: 72 IN | HEART RATE: 51 BPM | SYSTOLIC BLOOD PRESSURE: 110 MMHG | DIASTOLIC BLOOD PRESSURE: 68 MMHG | TEMPERATURE: 98 F | BODY MASS INDEX: 29.15 KG/M2

## 2023-07-26 DIAGNOSIS — R73.03 PRE-DIABETES: ICD-10-CM

## 2023-07-26 DIAGNOSIS — Z23 NEED FOR HEPATITIS B VACCINATION: ICD-10-CM

## 2023-07-26 DIAGNOSIS — Z23 NEED FOR MENINGITIS VACCINATION: Primary | ICD-10-CM

## 2023-07-26 LAB
ESTIMATED AVG GLUCOSE: 123 MG/DL (ref 68–131)
HBA1C MFR BLD: 5.9 % (ref 4–5.6)

## 2023-07-26 PROCEDURE — 99999PBSHW MENINGOCOCCAL CONJUGATE VACCINE 4-VALENT IM (MENVEO) 2 VIALS AGES 2MO-55 YEARS: Mod: PBBFAC,,,

## 2023-07-26 PROCEDURE — 99999PBSHW HEPATITIS B (RECOMBINANT) ADJUVANTED, 2 DOSE: ICD-10-PCS | Mod: PBBFAC,,,

## 2023-07-26 PROCEDURE — 90739 HEPB VACC 2/4 DOSE ADULT IM: CPT | Mod: PBBFAC,PN

## 2023-07-26 PROCEDURE — 99999 PR PBB SHADOW E&M-EST. PATIENT-LVL V: CPT | Mod: PBBFAC,,, | Performed by: FAMILY MEDICINE

## 2023-07-26 PROCEDURE — 99999PBSHW HEPATITIS B (RECOMBINANT) ADJUVANTED, 2 DOSE: Mod: PBBFAC,,,

## 2023-07-26 PROCEDURE — 83036 HEMOGLOBIN GLYCOSYLATED A1C: CPT | Performed by: FAMILY MEDICINE

## 2023-07-26 PROCEDURE — 99215 OFFICE O/P EST HI 40 MIN: CPT | Mod: PBBFAC,PN,25 | Performed by: FAMILY MEDICINE

## 2023-07-26 PROCEDURE — 36415 COLL VENOUS BLD VENIPUNCTURE: CPT | Mod: PN | Performed by: FAMILY MEDICINE

## 2023-07-26 PROCEDURE — 99215 PR OFFICE/OUTPT VISIT, EST, LEVL V, 40-54 MIN: ICD-10-PCS | Mod: S$PBB,,, | Performed by: FAMILY MEDICINE

## 2023-07-26 PROCEDURE — 99999 PR PBB SHADOW E&M-EST. PATIENT-LVL V: ICD-10-PCS | Mod: PBBFAC,,, | Performed by: FAMILY MEDICINE

## 2023-07-26 PROCEDURE — 99215 OFFICE O/P EST HI 40 MIN: CPT | Mod: S$PBB,,, | Performed by: FAMILY MEDICINE

## 2023-07-26 PROCEDURE — 90734 MENACWYD/MENACWYCRM VACC IM: CPT | Mod: PBBFAC,PN

## 2023-07-26 NOTE — PATIENT INSTRUCTIONS
Physically, everything looks good today.      Meningitis vaccine administered today.  This will complete your acquired vaccines for school.      We can also start the hepatitis B series today.  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      Doing great on the weight loss! Keep up the good work!     Continue to eat a healthy diet.  Be careful with portion sizes.  Includes lots of fresh fruits, vegetables, whole grains, lean proteins.  See info below.    Keep hydrated.  Be sure to drink at least 8-10, 8 oz, glasses of water every day.    Stay active.  Try to do some sort of physical activity every day.  Nothing outrageous, just try walking for 10-15 minutes each day.

## 2023-07-28 NOTE — PROGRESS NOTES
"    [facility]       [address] [place] [address]      Phone: [phone]      Fax: [phone]    Scott Escobar MD                Office Visit  2023        Subjective      HPI:  Viri Carlson is a 48 y.o. female presents today in clinic for "Immunizations  ."     48-year-old female presents today to discuss immunizations.      Patient states she will be going to [employer] in the fall.  Needs a couple of immunizations prior to school starting.  Needs one dose of Menactra.  This is the only required vaccine.  Other recommended vaccine was hep B series.    Overall, she feels okay today.  No chest pain, shortness a breath.  No fever, chills, body aches.  No coughing, sneezing, URI type symptoms.  Appetite is normal.  Bowel movements normal.  No urinary issues.      Still has some bloating and discomfort when eating.  Takes Zanaflex, which helps.  Follows with GI.      Still having lots of stress in her life.  Had filed for divorce previously, but according to [place] she has to wait six months before filing.  Plans to do that as soon as the deadline it lapses.    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    PSH: Gastric bypass with revision.  Adhesion removal x2.  Right lung double lobectomy.  Knee.  CABG with power port.  FMH:  Lung cancer-smoker.  CAD/mi.  HTN.  DM.    Allergies:  Multiple.  See epic.    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    T: Denies   A:  Rarely   D:  Denies     Exercise:  Walks daily approximately 1.5-2 miles.    Cards: Preet ([facility]  GI: Scott ([facility])  GYN: Codi ([facility])    Pap: 22  MM22      The following were updated and reviewed by myself in the chart: medications, past medical history, past surgical history, family history, social history, and allergies.     Medications:  Current Outpatient Medications on File Prior to Visit "   Medication Sig Dispense Refill    bumetanide (BUMEX) 1 MG tablet Take 1 tablet (1 mg total) by mouth once daily. 90 tablet 3    calcium carbonate (OS-ESE) 600 mg calcium (1,500 mg) Tab Take 600 mg by mouth.      cyanocobalamin 1,000 mcg/mL injection Inject 1 mL (1,000 mcg total) into the muscle every 30 days. 3 mL 3    ergocalciferol, vitamin D2, (VITAMIN D ORAL)       ferrous sulfate (FEOSOL) 325 mg (65 mg iron) Tab tablet Take 325 mg by mouth.      gabapentin (NEURONTIN) 600 MG tablet Take 1 tablet (600 mg total) by mouth 3 (three) times daily. 270 tablet 3    iron,carb/vit C/vit B12/folic (IRON 100 PLUS ORAL)       ondansetron (ZOFRAN-ODT) 8 MG TbDL Take 1 tablet (8 mg total) by mouth 2 (two) times daily. 20 tablet 0    pantoprazole (PROTONIX) 40 MG tablet Take 1 tablet (40 mg total) by mouth once daily. 30 tablet 11    potassium chloride (KLOR-CON) 20 mEq Pack Take 40 mEq by mouth once daily. 90 packet 3    promethazine (PHENERGAN) 12.5 MG Supp Place 1 suppository (12.5 mg total) rectally every 6 (six) hours as needed. 10 suppository 0    promethazine (PHENERGAN) 25 MG tablet Take 1 tablet (25 mg total) by mouth every 6 (six) hours as needed for Nausea. 20 tablet 0    spironolactone (ALDACTONE) 25 MG tablet Take 1 tablet (25 mg total) by mouth once daily. 90 tablet 3    tiZANidine (ZANAFLEX) 4 MG tablet Take 2 tablets (8 mg total) by mouth 3 (three) times daily. 540 tablet 3    traZODone (DESYREL) 150 MG tablet Take 1 tablet (150 mg total) by mouth every evening. 90 tablet 3    venlafaxine (EFFEXOR-XR) 75 MG 24 hr capsule Take 1 capsule (75 mg total) by mouth once daily. 90 capsule 3     No current facility-administered medications on file prior to visit.        PMHx:  Past Medical History:   Diagnosis Date    Anemia 02/01/1990    Anxiety 08/01/2010    Depression 2008    Encounter for blood transfusion 01/15/2008    Endometriosis of uterus 05/01/1999    Fibroid 2010    Heart disease     Heart murmur 01/14/2008     Hypertension 2009    Marfan syndrome       Patient Active Problem List    Diagnosis Date Noted    Chronic abdominal pain 07/10/2023    Psychophysiological insomnia 12/21/2022    Sleep difficulties 12/19/2022    Pre-diabetes 10/20/2022    Class 1 obesity due to excess calories with body mass index (BMI) of 31.0 to 31.9 in adult 10/20/2022    Colon cancer screening 10/04/2022    Abdominal bloating 05/17/2022    Coronary artery disease 03/14/2022    Essential (primary) hypertension  03/14/2022    Marfan's syndrome with other cardiovascular manifestations 03/14/2022    Venous insufficiency 03/14/2022    Anxiety 08/12/2019    Obstructive sleep apnea 01/30/2014    GERD (gastroesophageal reflux disease) 03/01/2013    History of pneumonectomy 03/01/2013        PSHx:  Past Surgical History:   Procedure Laterality Date    ABDOMINAL SURGERY  06/04/2006    several-had abscesses and complications after wt loss surgery; repair of small bowl injury; subsequent colon resection 6/14/19    APPENDECTOMY  2009    Rupture    BREAST BIOPSY Left 2019    CERVIX LESION DESTRUCTION      cryo for cervical dysplasia    CHOLECYSTECTOMY      COLON SURGERY  2006    COLONOSCOPY  2018    Normal    COLONOSCOPY N/A 12/13/2022    Procedure: COLONOSCOPY 10/29-card clearance received;  Surgeon: Ruben Chavez MD;  Location: [facility];  Service: Endoscopy;  Laterality: N/A;    COLONOSCOPY N/A 7/11/2023    Procedure: COLONOSCOPY;  Surgeon: Angy Ellis MD;  Location: [facility];  Service: Endoscopy;  Laterality: N/A;    CORONARY ARTERY BYPASS GRAFT (CABG)  2008    DILATION AND CURETTAGE OF UTERUS  2008    Miscarriage    ESOPHAGOGASTRODUODENOSCOPY N/A 7/11/2023    Procedure: EGD (ESOPHAGOGASTRODUODENOSCOPY);  Surgeon: Angy Ellis MD;  Location: [facility];  Service: Endoscopy;  Laterality: N/A;    LUNG REMOVAL, PARTIAL Right     2/3 of right lung removed; thought to be lung cancer, final path equals histoplasmosis    CHANDA-EN-Y PROCEDURE           FHx:  Family History   Problem Relation Age of Onset    Hypertension Mother     Rheum arthritis Mother     Cancer Mother     Diabetes Mother         Type 2 w/insulin    Lung cancer Mother         Lung cancer complications at 63    Heart attack Maternal Grandmother     Diabetes Maternal Grandmother         Brother Type 2    Hyperlipidemia Maternal Grandfather     Heart failure Maternal Grandfather     Heart failure Paternal Grandmother          from Heart Attack    Rheum arthritis Maternal Uncle         Blood sepsis    Cancer Maternal Aunt         Ovarian cancer    Cancer Maternal Aunt         Brain cancer    Migraines Brother     Diabetes Brother         Oral meds        Social:  Social History     Socioeconomic History    Marital status: Other   Tobacco Use    Smoking status: Never    Smokeless tobacco: Never    Tobacco comments:     Mother was a amoker.  I have used chewing tobacco   Substance and Sexual Activity    Alcohol use: Not Currently     Comment: occasion    Drug use: Never    Sexual activity: Not Currently     Partners: Male     Birth control/protection: Abstinence, None     Comment:          Allergies:  Review of patient's allergies indicates:   Allergen Reactions    Aspirin Anaphylaxis     Tongue swelling      Cephalexin Anaphylaxis, Nausea And Vomiting and Hives    Iodine and iodide containing products Other (See Comments)     Pt is not allergic to contrast dye IV     Levofloxacin Anaphylaxis    Sulfa (sulfonamide antibiotics) Anaphylaxis, Blisters, Dermatitis, Itching and Shortness Of Breath    Sulfamethoxazole-trimethoprim Anaphylaxis     3rd degree skin burning when given IV      Sulfasalazine Anaphylaxis    Tramadol Nausea And Vomiting    Adhesive Dermatitis, Hives and Itching     bandaids and adhesive on electrodes  bandaids and adhesive on electrodes, whelps      Adhesive tape-silicones Itching    Barium iodide Nausea And Vomiting     Oral only causes vomiting     "    ROS:  Review of Systems   Constitutional:  Positive for activity change. Negative for appetite change, chills, fever and unexpected weight change.   HENT:  Negative for congestion, hearing loss, postnasal drip, rhinorrhea, sore throat and trouble swallowing.    Eyes:  Negative for discharge and visual disturbance.   Respiratory:  Negative for cough, chest tightness, shortness of breath and wheezing.    Cardiovascular:  Negative for chest pain and palpitations.   Gastrointestinal:  Negative for abdominal pain, blood in stool, constipation, diarrhea, nausea and vomiting.   Endocrine: Negative for polydipsia and polyuria.   Genitourinary:  Negative for difficulty urinating, dysuria, hematuria and menstrual problem.   Musculoskeletal:  Positive for arthralgias and neck pain. Negative for joint swelling and myalgias.   Skin:  Negative for color change and rash.   Neurological:  Positive for headaches. Negative for speech difficulty and weakness.   Psychiatric/Behavioral:  Positive for dysphoric mood. Negative for confusion.    All other systems reviewed and are negative.       Objective      /68   Pulse (!) 51   Temp 97.9 °F (36.6 °C)   Ht 6' 4" (1.93 m)   Wt 97.6 kg (215 lb 2.7 oz)   LMP 12/14/2022   BMI 26.19 kg/m²   Ht Readings from Last 3 Encounters:   07/26/23 6' 4" (1.93 m)   07/11/23 6' 4" (1.93 m)   07/06/23 6' 4" (1.93 m)     Wt Readings from Last 3 Encounters:   07/26/23 97.6 kg (215 lb 2.7 oz)   07/11/23 96.6 kg (213 lb)   07/06/23 98.9 kg (218 lb)       PHYSICAL EXAM:  Physical Exam  Vitals and nursing note reviewed.   Constitutional:       General: She is not in acute distress.     Appearance: Normal appearance.   HENT:      Head: Normocephalic and atraumatic.      Right Ear: Tympanic membrane, ear canal and external ear normal.      Left Ear: Tympanic membrane, ear canal and external ear normal.      Nose: Nose normal. No congestion or rhinorrhea.      Mouth/Throat:      Mouth: Mucous " membranes are moist.      Pharynx: Oropharynx is clear. No oropharyngeal exudate or posterior oropharyngeal erythema.   Eyes:      Extraocular Movements: Extraocular movements intact.      Conjunctiva/sclera: Conjunctivae normal.      Pupils: Pupils are equal, round, and reactive to light.   Cardiovascular:      Rate and Rhythm: Normal rate and regular rhythm.   Pulmonary:      Effort: Pulmonary effort is normal. No respiratory distress.      Breath sounds: No wheezing, rhonchi or rales.   Musculoskeletal:         General: Normal range of motion.      Cervical back: Normal range of motion.   Lymphadenopathy:      Cervical: No cervical adenopathy.   Skin:     General: Skin is warm and dry.      Findings: No rash.   Neurological:      Mental Status: She is alert.            LABS / IMAGING:  Recent Results (from the past 4368 hour(s))   CBC auto differential    Collection Time: 01/31/23 12:52 PM   Result Value Ref Range    WBC 6.32 3.90 - 12.70 K/uL    RBC 4.49 4.00 - 5.40 M/uL    Hemoglobin 11.4 (L) 12.0 - 16.0 g/dL    Hematocrit 36.8 (L) 37.0 - 48.5 %    MCV 82 82 - 98 fL    MCH 25.4 (L) 27.0 - 31.0 pg    MCHC 31.0 (L) 32.0 - 36.0 g/dL    RDW 12.8 11.5 - 14.5 %    Platelets 250 150 - 450 K/uL    MPV 11.0 9.2 - 12.9 fL    Immature Granulocytes 0.3 0.0 - 0.5 %    Gran # (ANC) 3.9 1.8 - 7.7 K/uL    Immature Grans (Abs) 0.02 0.00 - 0.04 K/uL    Lymph # 1.8 1.0 - 4.8 K/uL    Mono # 0.6 0.3 - 1.0 K/uL    Eos # 0.0 0.0 - 0.5 K/uL    Baso # 0.03 0.00 - 0.20 K/uL    nRBC 0 0 /100 WBC    Gran % 61.4 38.0 - 73.0 %    Lymph % 28.3 18.0 - 48.0 %    Mono % 9.2 4.0 - 15.0 %    Eosinophil % 0.3 0.0 - 8.0 %    Basophil % 0.5 0.0 - 1.9 %    Platelet Estimate Appears normal     Aniso Slight     Poik Slight     Target Cells Occasional     Tear Drop Cells Occasional     Differential Method Automated    Comprehensive metabolic panel    Collection Time: 01/31/23 12:52 PM   Result Value Ref Range    Sodium 141 136 - 145 mmol/L    Potassium  3.7 3.5 - 5.1 mmol/L    Chloride 107 95 - 110 mmol/L    CO2 25 23 - 29 mmol/L    Glucose 148 (H) 70 - 110 mg/dL    BUN 9 6 - 20 mg/dL    Creatinine 0.7 0.5 - 1.4 mg/dL    Calcium 9.1 8.7 - 10.5 mg/dL    Total Protein 6.8 6.0 - 8.4 g/dL    Albumin 3.8 3.5 - 5.2 g/dL    Total Bilirubin 0.5 0.1 - 1.0 mg/dL    Alkaline Phosphatase 142 (H) 55 - 135 U/L    AST 18 10 - 40 U/L    ALT 11 10 - 44 U/L    Anion Gap 9 8 - 16 mmol/L    eGFR >60 >60 mL/min/1.73 m^2   Troponin I #1    Collection Time: 01/31/23 12:52 PM   Result Value Ref Range    Troponin I <0.006 0.000 - 0.026 ng/mL   BNP    Collection Time: 01/31/23 12:52 PM   Result Value Ref Range     (H) 0 - 99 pg/mL   Troponin I    Collection Time: 01/31/23  3:43 PM   Result Value Ref Range    Troponin I <0.006 0.000 - 0.026 ng/mL   IGP,rfx Aptima HPV all pth    Collection Time: 07/06/23 12:00 AM   Result Value Ref Range    IGP,RFX APTIMA HPV ALL PTH     C. trachomatis/N. gonorrhoeae by AMP DNA    Collection Time: 07/06/23  9:57 AM    Specimen: Genital   Result Value Ref Range    Chlamydia, Amplified DNA Not Detected Not Detected    N gonorrhoeae, amplified DNA Not Detected Not Detected   POCT urine pregnancy    Collection Time: 07/11/23 10:19 AM   Result Value Ref Range    POC Preg Test, Ur Negative Negative     Acceptable Yes    Specimen to Pathology, Surgery Gastrointestinal tract    Collection Time: 07/11/23 11:47 AM   Result Value Ref Range    Final Pathologic Diagnosis       1. Stomach pouch (biopsy):  Oxyntic mucosa with minimal chronic inflammation, non-specific  No active inflammation   No Helicobacter organisms    2. Colon, transverse polyp (polypectomy):   Tubular adenoma      Gross       1: Surgery ID:  [id number]   Pathology ID:  [id number]  1. Received in formalin labeled &quot;gastric pouch biopsy rule out H pylori chronic abdominal pain&quot; are multiple tan tissue fragments aggregating to 0.3 x 0.2 x 0.1 cm.  The specimen is submitted  entirely in cassette 1A.    XCV--1-A        Grossed by: Yifan Alejandre MS, PA([facility])   2: Surgery ID:  [id number]   Pathology ID:  [id number]  2. Received in formalin labeled &quot;transverse colon polypectomies&quot; are multiple tan tissue fragments aggregating to 0.5 x 0.5 x 0.2 cm.  The specimen is submitted entirely in cassette 2A.    ZVH--2-A        Grossed by: Yifan Alejandre MS, PA([facility])      Disclaimer       Unless the case is a 'gross only' or additional testing only, the final diagnosis for each specimen is based on a microscopic examination of appropriate tissue sections.   Hemoglobin A1C    Collection Time: 07/26/23  9:28 AM   Result Value Ref Range    Hemoglobin A1C 5.9 (H) 4.0 - 5.6 %    Estimated Avg Glucose 123 68 - 131 mg/dL         Assessment    1. Need for meningitis vaccination    2. Need for hepatitis B vaccination          Plan    Viri was seen today for immunizations.    Diagnoses and all orders for this visit:    Need for meningitis vaccination  -     (In Office Administered) Meningococcal Conjugate - MCV4O (MENVEO) 2 VIALS Ages 2mo-55years  -     (In Office Administered) Hepatitis B (Recombinant) Adjuvanted, 2 dose    Need for hepatitis B vaccination  -     (In Office Administered) Hepatitis B Vaccine (Adult) (IM)      Physically, everything looks okay today.      Vaccines updated.    Continue current meds.    FOLLOW-UP:  Follow up if symptoms worsen or fail to improve.    I spent a total of 45 minutes face to face and non-face to face on the date of this visit.This includes time preparing to see the patient (eg, review of tests, notes), obtaining and/or reviewing additional history from an independent historian and/or outside medical records, documenting clinical information in the electronic health record, independently interpreting results and/or communicating results to the patient/family/caregiver, or care coordinator.    Signed by:  Scott Escobar MD

## 2023-08-08 ENCOUNTER — TELEPHONE (OUTPATIENT)
Dept: ADMINISTRATIVE | Facility: HOSPITAL | Age: 48
End: 2023-08-08
Payer: MEDICARE

## 2023-08-15 PROBLEM — E55.9 VITAMIN D DEFICIENCY: Status: ACTIVE | Noted: 2023-08-15

## 2023-08-15 PROBLEM — Z98.84 HISTORY OF GASTRIC BYPASS: Status: ACTIVE | Noted: 2023-08-15

## 2023-08-15 PROBLEM — Z90.2 HISTORY OF LOBECTOMY OF LUNG: Status: ACTIVE | Noted: 2023-08-15

## 2023-08-15 PROBLEM — I27.20 PULMONARY HYPERTENSION: Status: ACTIVE | Noted: 2023-08-15

## 2023-08-21 ENCOUNTER — OFFICE VISIT (OUTPATIENT)
Dept: INTERNAL MEDICINE | Facility: CLINIC | Age: 48
End: 2023-08-21
Payer: MEDICARE

## 2023-08-21 VITALS
RESPIRATION RATE: 20 BRPM | SYSTOLIC BLOOD PRESSURE: 122 MMHG | WEIGHT: 214.94 LBS | BODY MASS INDEX: 29.11 KG/M2 | HEIGHT: 72 IN | DIASTOLIC BLOOD PRESSURE: 74 MMHG | HEART RATE: 68 BPM

## 2023-08-21 DIAGNOSIS — R10.9 CHRONIC ABDOMINAL PAIN: ICD-10-CM

## 2023-08-21 DIAGNOSIS — F33.0 MILD EPISODE OF RECURRENT MAJOR DEPRESSIVE DISORDER: ICD-10-CM

## 2023-08-21 DIAGNOSIS — E55.9 VITAMIN D DEFICIENCY: ICD-10-CM

## 2023-08-21 DIAGNOSIS — Z86.010 HISTORY OF COLON POLYPS: ICD-10-CM

## 2023-08-21 DIAGNOSIS — I10 ESSENTIAL (PRIMARY) HYPERTENSION: ICD-10-CM

## 2023-08-21 DIAGNOSIS — Z59.9 FINANCIAL DIFFICULTIES: ICD-10-CM

## 2023-08-21 DIAGNOSIS — K21.9 GASTROESOPHAGEAL REFLUX DISEASE, UNSPECIFIED WHETHER ESOPHAGITIS PRESENT: ICD-10-CM

## 2023-08-21 DIAGNOSIS — Z90.2 HISTORY OF PNEUMONECTOMY: ICD-10-CM

## 2023-08-21 DIAGNOSIS — G47.9 SLEEP DIFFICULTIES: ICD-10-CM

## 2023-08-21 DIAGNOSIS — F51.04 PSYCHOPHYSIOLOGICAL INSOMNIA: ICD-10-CM

## 2023-08-21 DIAGNOSIS — Z90.2 HISTORY OF LOBECTOMY OF LUNG: ICD-10-CM

## 2023-08-21 DIAGNOSIS — Z98.890 HISTORY OF PNEUMONECTOMY: ICD-10-CM

## 2023-08-21 DIAGNOSIS — E66.09 CLASS 1 OBESITY DUE TO EXCESS CALORIES WITH SERIOUS COMORBIDITY AND BODY MASS INDEX (BMI) OF 31.0 TO 31.9 IN ADULT: ICD-10-CM

## 2023-08-21 DIAGNOSIS — G89.29 CHRONIC ABDOMINAL PAIN: ICD-10-CM

## 2023-08-21 DIAGNOSIS — G47.33 OBSTRUCTIVE SLEEP APNEA: ICD-10-CM

## 2023-08-21 DIAGNOSIS — R73.03 PRE-DIABETES: ICD-10-CM

## 2023-08-21 DIAGNOSIS — Z98.84 HISTORY OF GASTRIC BYPASS: ICD-10-CM

## 2023-08-21 DIAGNOSIS — Q87.418 MARFAN'S SYNDROME WITH OTHER CARDIOVASCULAR MANIFESTATIONS: ICD-10-CM

## 2023-08-21 DIAGNOSIS — I27.20 PULMONARY HYPERTENSION: ICD-10-CM

## 2023-08-21 DIAGNOSIS — I87.2 VENOUS INSUFFICIENCY: ICD-10-CM

## 2023-08-21 DIAGNOSIS — I25.10 CORONARY ARTERY DISEASE INVOLVING NATIVE HEART, UNSPECIFIED VESSEL OR LESION TYPE, UNSPECIFIED WHETHER ANGINA PRESENT: ICD-10-CM

## 2023-08-21 DIAGNOSIS — F41.9 ANXIETY: ICD-10-CM

## 2023-08-21 DIAGNOSIS — Z00.00 ENCOUNTER FOR PREVENTATIVE ADULT HEALTH CARE EXAMINATION: Primary | ICD-10-CM

## 2023-08-21 PROCEDURE — 99999 PR PBB SHADOW E&M-EST. PATIENT-LVL V: ICD-10-PCS | Mod: PBBFAC,,, | Performed by: NURSE PRACTITIONER

## 2023-08-21 PROCEDURE — 99999 PR PBB SHADOW E&M-EST. PATIENT-LVL V: CPT | Mod: PBBFAC,,, | Performed by: NURSE PRACTITIONER

## 2023-08-21 PROCEDURE — 99215 OFFICE O/P EST HI 40 MIN: CPT | Mod: PBBFAC | Performed by: NURSE PRACTITIONER

## 2023-08-21 PROCEDURE — G0439 PPPS, SUBSEQ VISIT: HCPCS | Mod: ,,, | Performed by: NURSE PRACTITIONER

## 2023-08-21 PROCEDURE — G0439 PR MEDICARE ANNUAL WELLNESS SUBSEQUENT VISIT: ICD-10-PCS | Mod: ,,, | Performed by: NURSE PRACTITIONER

## 2023-08-21 SDOH — SOCIAL DETERMINANTS OF HEALTH (SDOH): PROBLEM RELATED TO HOUSING AND ECONOMIC CIRCUMSTANCES, UNSPECIFIED: Z59.9

## 2023-08-21 NOTE — PATIENT INSTRUCTIONS
Counseling and Referral of Other Preventative  (Italic type indicates deductible and co-insurance are waived)    Patient Name: Viri Carlson  Today's Date: 8/21/2023    Health Maintenance       Date Due Completion Date    High Dose Statin Never done ---    Pneumococcal Vaccines (Age 0-64) (2 - PPSV23 or PCV20) 04/11/2014 2/14/2014    COVID-19 Vaccine (4 - Pfizer series) 12/30/2021 11/4/2021    Shingles Vaccine (1 of 2) Never done ---    Influenza Vaccine (1) 09/01/2023 10/8/2022    Mammogram 07/10/2024 7/10/2023    Hemoglobin A1c (Prediabetes) 07/26/2024 7/26/2023    TETANUS VACCINE 10/03/2024 10/3/2014    Lipid Panel 09/13/2027 9/13/2022    Cervical Cancer Screening 07/06/2028 7/6/2023    Colorectal Cancer Screening 07/11/2028 7/11/2023        No orders of the defined types were placed in this encounter.    The following information is provided to all patients.  This information is to help you find resources for any of the problems found today that may be affecting your health:                Living healthy guide: www.[facility].[place].gov      Understanding Diabetes: www.diabetes.org      Eating healthy: www.cdc.gov/healthyweight      CDC home safety checklist: www.cdc.gov/steadi/patient.html      Agency on Aging: www.goea.[place].[facility]      Alcoholics anonymous (AA): www.aa.org      Physical Activity: www.best.nih.gov/ye6wfdm      Tobacco use: www.quitwithusla.org

## 2023-08-21 NOTE — PROGRESS NOTES
"  Medical history  Family History  Social history  Allergies and Current Medications  Health Risk Assessment  Health Maintenance  Care Team         ** See Completed Assessments for Annual Wellness Visit within the encounter summary.**         The following assessments were completed:  Living Situation  CAGE  Depression Screening  Timed Get Up and Go  Whisper Test  Cognitive Function Screening  Nutrition Screening  ADL Screening  PAQ Screening        Vitals:    08/21/23 0819   BP: 122/74   BP Location: Right arm   Patient Position: Sitting   Pulse: 68   Resp: 20   Weight: 97.5 kg (214 lb 15.2 oz)   Height:    Pain scale 6' 4.5" (1.943 m)    1/5 chronic abdominal pain     Body mass index is 25.82 kg/m².  Physical Exam  Constitutional:       General: She is not in acute distress.     Appearance: She is not ill-appearing or diaphoretic.   HENT:      Head: Normocephalic and atraumatic.      Mouth/Throat:      Mouth: Mucous membranes are moist.   Eyes:      General:         Right eye: No discharge.         Left eye: No discharge.      Conjunctiva/sclera: Conjunctivae normal.   Cardiovascular:      Rate and Rhythm: Normal rate and regular rhythm.   Pulmonary:      Effort: Pulmonary effort is normal. No respiratory distress.      Breath sounds: Normal breath sounds.   Skin:     General: Skin is warm and dry.   Neurological:      Mental Status: She is alert and oriented to person, place, and time. Mental status is at baseline.   Psychiatric:         Mood and Affect: Mood normal.         Behavior: Behavior normal.         Thought Content: Thought content normal.         Judgment: Judgment normal.           Diagnoses and health risks identified today and associated recommendations/orders:    1. Encounter for preventative adult health care examination  Review for opioid screening: Patient does not have Rx for Opioids  Review for substance use disorder: Patient does not use substance per chart    Patient states she rarely drinks " alcohol- maybe 1 drink every few months    I offered to discuss advanced care planning, including how to pick a person who would make decisions for you if you were unable to make them for yourself, called a health care power of , and what kind of decisions you might make such as use of life sustaining treatments such as ventilators and tube feeding when faced with a life limiting illness recorded on a living will that they will need to know. (How you want to be cared for as you near the end of your natural life)     X Patient is interested in learning more about how to make advanced directives.  I provided them paperwork and offered to discuss this with them.  Viri Carlson presented for a  Medicare AWV and comprehensive Health Risk Assessment today. The following components were reviewed and updated:    2. Class 1 obesity due to excess calories with serious comorbidity and body mass index (BMI) of 31.0 to 31.9 in adult, Pre-diabetes  Monitor  Follow up with PCP  Patient educated on NCS diet    3. Pulmonary hypertension   3/14/2022 Echo-  Summary    The left ventricle is normal in size with normal systolic function.  The estimated ejection fraction is 65%.  Mild left atrial enlargement.  Normal right ventricular size with normal right ventricular systolic function.  Mild mitral regurgitation.  Mild to moderate tricuspid regurgitation.  Normal central venous pressure (3 mmHg).  The estimated PA systolic pressure is 36 mmHg.  Monitor  Follow up with Cardiology and PCP  Dx discussed with patient    4. Anxiety, Mild episode of recurrent major depressive disorder, Sleep difficulties, Psychophysiological insomnia  Monitor  Stable  Follow up with Counselor  Continue home trazadone, desyrel    5. History of pneumonectomy,  History of double lobectomy of  right lung, Obstructive sleep apnea  Monitor  Follow up as directed  Patient states she does not wear a CPAP    6. Coronary artery disease involving native heart,  unspecified vessel or lesion type Hx CABG 2008, Marfan's syndrome with other cardiovascular manifestations  Monitor  Follow up with Cardiology    7. Essential (primary) hypertension   Monitor  Stable  Continue home bumex, potassium, aldactone    8. Venous insufficiency  Monitor  Follow up as needed, diretced  Encouraged to wear compression stockings    9. Vitamin D deficiency  Monitor  Follow up with PCP  Continue home Vitamin D    10. Financial difficulties  Patient reports ongoing financial difficulties- Check to see if patient can qualify for Patient assistance program  - Ambulatory referral/consult to Outpatient Case Management    11. History of gastric bypass with revision, Gastroesophageal reflux disease, unspecified whether esophagitis present, History of colon polyps, Chronic abdominal pain  Monitor  Follow up with  GI as directed  Continue home prn phenergan, protonix                  Provided Viri with a 5-10 year written screening schedule and personal prevention plan. Recommendations were developed using the USPSTF age appropriate recommendations. Education, counseling, and referrals were provided as needed. After Visit Summary printed and given to patient which includes a list of additional screenings\tests needed.    Follow up in about 1 year (around 8/21/2024) for Annual wellness visit.    JOVANI Hernandez

## 2023-09-06 ENCOUNTER — OUTPATIENT CASE MANAGEMENT (OUTPATIENT)
Dept: ADMINISTRATIVE | Facility: OTHER | Age: 48
End: 2023-09-06
Payer: MEDICARE

## 2023-09-06 NOTE — PROGRESS NOTES
Outpatient Care Management  Patient Does Not Consent    Patient: Viri Carlson  MRN:  [id number]  Date of Service:  9/6/2023  Completed by:  Diana Dumont RN    Chief Complaint   Patient presents with    OPCM Enrollment Call    Nursing Assessment    Case Closure     Pt is interested in SW referral for Patient financial assistance and declines OPCM enrollment        Patient Summary           Consent Received:  Decline

## 2023-09-08 ENCOUNTER — OUTPATIENT CASE MANAGEMENT (OUTPATIENT)
Dept: ADMINISTRATIVE | Facility: OTHER | Age: 48
End: 2023-09-08
Payer: MEDICARE

## 2023-09-08 NOTE — LETTER
Viri Carlson  [address] [place] [address]      Dear Viri Carlson,     I am writing from the Outpatient Complex Care Management Department at [facility].  I received a referral from Diana Dumont RN to contact you regarding any needs you may have. I was unable to reach you by phone. Please contact me for assistance.     I can be reached at [phone] Monday thru Friday from 8:00am to 4:30pm.      Additionally, [facility] also has a program with a nurse available 24/7 to answer questions or provide medical advice.  [facility] can be reached at [phone].      Sincerely,      Whitley Mistry LMSW  Outpatient Care Management

## 2023-09-08 NOTE — PROGRESS NOTES
1st Attempt to complete Social Work Assessment for Outpatient Care Management; left message and send portal letter with contact information requesting a return call.  SW will reattempt at a later date.

## 2023-09-12 NOTE — PROGRESS NOTES
2nd attempt to complete Social Work Assessment for OPCM; left message requesting a return call. Will attempt again at a later date.

## 2023-09-19 ENCOUNTER — PATIENT MESSAGE (OUTPATIENT)
Dept: PSYCHIATRY | Facility: CLINIC | Age: 48
End: 2023-09-19
Payer: MEDICARE

## 2023-09-19 ENCOUNTER — PATIENT MESSAGE (OUTPATIENT)
Dept: ADMINISTRATIVE | Facility: OTHER | Age: 48
End: 2023-09-19
Payer: MEDICARE

## 2023-09-19 ENCOUNTER — TELEPHONE (OUTPATIENT)
Dept: PRIMARY CARE CLINIC | Facility: CLINIC | Age: 48
End: 2023-09-19
Payer: MEDICARE

## 2023-09-19 ENCOUNTER — TELEPHONE (OUTPATIENT)
Dept: PSYCHIATRY | Facility: CLINIC | Age: 48
End: 2023-09-19
Payer: MEDICARE

## 2023-09-19 DIAGNOSIS — Z23 NEED FOR HEPATITIS B VACCINATION: Primary | ICD-10-CM

## 2023-09-19 NOTE — TELEPHONE ENCOUNTER
----- Message from Sonia Vivas sent at 9/19/2023 10:12 AM CDT -----  Contact: self [phone]  Patient called in this morning to schedule her second hep vaccine with the provider but there are no appointments available until October. She would like to know if you can fit her in earlier. Please call back [phone]. Thanks tpwes
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
## 2023-09-19 NOTE — TELEPHONE ENCOUNTER
----- Message from Sonia Vivas sent at 9/19/2023 10:12 AM CDT -----  Contact: self [phone]  Patient called in this morning to schedule her second hep vaccine with the provider but there are no appointments available until October. She would like to know if you can fit her in earlier. Please call back [phone]. Thanks tpwes

## 2023-09-19 NOTE — TELEPHONE ENCOUNTER
Called and spoke with pt . Pt is scheduled at the [facility] to receive her next injection. Pt verbalized and requested.

## 2023-09-21 ENCOUNTER — PATIENT MESSAGE (OUTPATIENT)
Dept: PRIMARY CARE CLINIC | Facility: CLINIC | Age: 48
End: 2023-09-21
Payer: MEDICARE

## 2023-09-22 ENCOUNTER — CLINICAL SUPPORT (OUTPATIENT)
Dept: INTERNAL MEDICINE | Facility: CLINIC | Age: 48
End: 2023-09-22
Payer: MEDICARE

## 2023-09-22 DIAGNOSIS — Z23 NEED FOR VACCINATION: Primary | ICD-10-CM

## 2023-09-22 PROCEDURE — 99999 PR PBB SHADOW E&M-EST. PATIENT-LVL II: CPT | Mod: PBBFAC,,,

## 2023-09-22 PROCEDURE — 99999 PR PBB SHADOW E&M-EST. PATIENT-LVL II: ICD-10-PCS | Mod: PBBFAC,,,

## 2023-09-22 PROCEDURE — 99999PBSHW HEPATITIS B (RECOMBINANT) ADJUVANTED, 2 DOSE: Mod: PBBFAC,,,

## 2023-09-22 PROCEDURE — 99999PBSHW HEPATITIS B (RECOMBINANT) ADJUVANTED, 2 DOSE: ICD-10-PCS | Mod: PBBFAC,,,

## 2023-09-22 PROCEDURE — 99212 OFFICE O/P EST SF 10 MIN: CPT | Mod: PBBFAC

## 2023-09-22 PROCEDURE — 90739 HEPB VACC 2/4 DOSE ADULT IM: CPT | Mod: PBBFAC

## 2023-09-26 ENCOUNTER — OUTPATIENT CASE MANAGEMENT (OUTPATIENT)
Dept: ADMINISTRATIVE | Facility: OTHER | Age: 48
End: 2023-09-26
Payer: MEDICARE

## 2023-09-26 ENCOUNTER — PATIENT MESSAGE (OUTPATIENT)
Dept: GASTROENTEROLOGY | Facility: CLINIC | Age: 48
End: 2023-09-26
Payer: MEDICARE

## 2023-09-26 DIAGNOSIS — R10.9 CHRONIC ABDOMINAL PAIN: Primary | ICD-10-CM

## 2023-09-26 DIAGNOSIS — G89.29 CHRONIC ABDOMINAL PAIN: Primary | ICD-10-CM

## 2023-09-26 NOTE — LETTER
Viri Carlson  [address] [place] [id number]      Dear Viri Carlson,     I am writing from the Outpatient Care Management Department at [facility]. I have been unsuccessful at reaching you since we spoke on 9/19/2023.  I hope you found the assistance previously provided to you helpful.     Please contact me at [phone] from 8:00AM to 4:30 PM on Monday thru Friday.     As you know, [facility] also has a program with a nurse available 24/7 to answer questions or provide medical advice.  [facility] can be reached at [phone].    Sincerely,       Whitley Mistry [facility]  Outpatient Care Management

## 2023-09-26 NOTE — PROGRESS NOTES
1st Attempt to complete SW follow-up for Outpatient Care Management; left message and sent portal letter with contact information requesting a return call. SW will reattempt at a later date.

## 2023-10-02 ENCOUNTER — TELEPHONE (OUTPATIENT)
Dept: PAIN MEDICINE | Facility: CLINIC | Age: 48
End: 2023-10-02
Payer: MEDICARE

## 2023-10-03 ENCOUNTER — OFFICE VISIT (OUTPATIENT)
Dept: PAIN MEDICINE | Facility: CLINIC | Age: 48
End: 2023-10-03
Payer: MEDICARE

## 2023-10-03 VITALS
RESPIRATION RATE: 14 BRPM | DIASTOLIC BLOOD PRESSURE: 58 MMHG | BODY MASS INDEX: 28.93 KG/M2 | WEIGHT: 213.63 LBS | HEART RATE: 59 BPM | HEIGHT: 72 IN | SYSTOLIC BLOOD PRESSURE: 110 MMHG

## 2023-10-03 DIAGNOSIS — G89.29 CHRONIC ABDOMINAL PAIN: Primary | ICD-10-CM

## 2023-10-03 DIAGNOSIS — G89.18 PAIN FOLLOWING SURGERY OR PROCEDURE: ICD-10-CM

## 2023-10-03 DIAGNOSIS — R10.9 CHRONIC ABDOMINAL PAIN: Primary | ICD-10-CM

## 2023-10-03 DIAGNOSIS — G89.4 CHRONIC PAIN DISORDER: ICD-10-CM

## 2023-10-03 DIAGNOSIS — Z90.2 HISTORY OF LOBECTOMY OF LUNG: ICD-10-CM

## 2023-10-03 PROCEDURE — 99205 PR OFFICE/OUTPT VISIT, NEW, LEVL V, 60-74 MIN: ICD-10-PCS | Mod: S$PBB,,, | Performed by: ANESTHESIOLOGY

## 2023-10-03 PROCEDURE — 99205 OFFICE O/P NEW HI 60 MIN: CPT | Mod: S$PBB,,, | Performed by: ANESTHESIOLOGY

## 2023-10-03 PROCEDURE — 99214 OFFICE O/P EST MOD 30 MIN: CPT | Mod: PBBFAC,PN | Performed by: ANESTHESIOLOGY

## 2023-10-03 PROCEDURE — 99999 PR PBB SHADOW E&M-EST. PATIENT-LVL IV: ICD-10-PCS | Mod: PBBFAC,,, | Performed by: ANESTHESIOLOGY

## 2023-10-03 PROCEDURE — 99999 PR PBB SHADOW E&M-EST. PATIENT-LVL IV: CPT | Mod: PBBFAC,,, | Performed by: ANESTHESIOLOGY

## 2023-10-03 RX ORDER — HYDROCODONE BITARTRATE AND ACETAMINOPHEN 5; 325 MG/1; MG/1
1 TABLET ORAL EVERY 8 HOURS PRN
Qty: 21 TABLET | Refills: 0 | Status: SHIPPED | OUTPATIENT
Start: 2023-10-03 | End: 2023-12-05 | Stop reason: SDUPTHER
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
## 2023-10-03 NOTE — H&P (VIEW-ONLY)
New Patient Interventional Pain Note (Initial Visit)    Referring Physician: Lashonda Guerrero NP    PCP: Scott Escobar MD    Chief Complaint:     Chief Complaint   Patient presents with    Abdominal Pain        SUBJECTIVE:    Viri Carlson is a 48 y.o. female who presents to the clinic for the evaluation of abdominal pain.  Patient reports over 10 year history of abdominal pain.  Patient has undergone multiple abdominal surgeries after complications from gastric sleeve surgery including appendectomy, cholecystectomy, and multiple lysis of adhesions.  Patient has also undergone left lobe lobectomy.  Most recent lysis of adhesions was then 2019.  Pain is described as a aching throbbing pain primarily in the left upper quadrant of the abdomen.  Patient reports that as pain worsens this pain will radiate to the left lower quadrant and occasionally radiates to her right abdomen as well.  Patient reports pain is worse with certain foods including rice and with medications that lead to bowel changes, pain is typically better  with hydration.  Pain is currently rated a 5/10, but increase to a 10/10 with exacerbating activity. Denies any fevers, chills, changes in gait, saddle anesthesia, or bowel and bladder incontinence    Non-Pharmacologic Treatments:  Physical Therapy/Home Exercise: yes  Ice/Heat:yes  TENS: no  Acupuncture: no  Massage: yes  Chiropractic: no        Previous Pain Medications:  NSAIDs, Tylenol, muscle relaxers, neuropathics, opioids, topicals       report:  Reviewed and consistent with medication use as prescribed.    Pain Procedures:   None    Pain Disability Index Review:         10/3/2023     1:09 PM   Last 3 PDI Scores   Pain Disability Index (PDI) 43       Imaging:   CTA CHEST ABDOMEN PELVIS  Order: [id number]  Impression    Preliminary impression:   1.  Negative for intramural hematoma or aortic dissection/aneurysm.   2.  Status post CABG, cholecystectomy, gastric bypass surgery,  and   bowel anastomoses which are without change.   3.  Left common iliac and external vein stent is unchanged.   Colonic diverticulosis without diverticulitis.     ------THE FOLLOWING REPRESENTS THE FINAL ATTENDING REPORT------       Provided history:   45 years old Female with provided history of Thoracic aortic aneurysm   suspected, initial exam, Patient with a history of Marfan syndrome,   chest pain radiating towards back, negative troponins, pain not   relieved with medication.     Comparison:   CT chest 4/20/2020, CT abdomen pelvis 9/22/2019.     Technique:   Routine nonenhanced CT through the chest was performed. Subsequently,   enhanced CT through the chest, abdomen, and pelvis was performed in   the arterial phase of contrast per protocol. Multiplanar   reconstructions were provided. 3-D reconstructions were performed on   an independent workstation.     Dose reduction techniques were utilized for this exam including   automated exposure control, adjustments to mA and/or kV according to   patient's size, and the use of iterative reconstruction techniques.     Findings:     There is no thoracic aortic intramural hematoma, dissection, or   penetrating ulcer. The thoracic aorta and pulmonary artery are normal   in caliber. There is no aortic calcific atherosclerosis. Prior   sternotomy and coronary artery bypass grafting The proximal   supraaortic vessels are patent. There are no central pulmonary   arterial filling defects.     The abdominal aorta is normal in course and caliber without dissection   or penetrating ulcer. There is no calcific atherosclerosis of the   abdominal aorta and its proximal branches. A left iliac/common iliac   stent is present. Luminal evaluation of this stent is limited given   the arterial phase of contrast. The celiac, SMA, single right and   duplicated left renal, and HAZEL are patent.     The heart is normal in size. The pericardium is unremarkable. There is   no mediastinal or  hilar lymphadenopathy.     Bilateral lungs are clear of focal consolidation. There are no pleural   effusions or pneumothorax. There is minimal right basilar linear   parenchymal scarring versus atelectasis.     Previous gastric bypass and multiple small/large bowel anastomoses   with unremarkable-appearing suture beds and no associated obstruction.   Scattered colonic diverticulosis without diverticulitis.     Prior cholecystectomy with mild expected prominence of the biliary   system.     Liver, spleen, pancreas, adrenal glands, and kidneys are unremarkable.     Urinary bladder is unremarkable. Stable mildly enlarged multifibroid   uterus.     No free air or free fluid.     No acute osseous abnormalities.     Impression:   1.  Negative for acute aortic pathology.   2.  No acute findings of the chest, abdomen, or pelvis.      Past Medical History:   Diagnosis Date    Anemia 02/01/1990    Anxiety 08/01/2010    Depression 2008    Encounter for blood transfusion 01/15/2008    Endometriosis of uterus 05/01/1999    Fibroid 2010    Gestational diabetes     Heart disease     Heart murmur 01/14/2008    Hypertension 2009    Marfan syndrome     Varicose veins - Both legs      Past Surgical History:   Procedure Laterality Date    ABDOMINAL SURGERY  06/04/2006    several-had abscesses and complications after wt loss surgery; repair of small bowl injury; subsequent colon resection 6/14/19    APPENDECTOMY  2009    Rupture    BREAST BIOPSY Left 2019    CERVIX LESION DESTRUCTION      cryo for cervical dysplasia    CHOLECYSTECTOMY      COLON SURGERY  2006    COLONOSCOPY  2018    Normal    COLONOSCOPY N/A 12/13/2022    Procedure: COLONOSCOPY 10/29-card clearance received;  Surgeon: Ruben Chavez MD;  Location: [facility];  Service: Endoscopy;  Laterality: N/A;    COLONOSCOPY N/A 07/11/2023    Procedure: COLONOSCOPY;  Surgeon: Angy Ellis MD;  Location: [facility];  Service: Endoscopy;  Laterality: N/A;    CORONARY ARTERY  BYPASS GRAFT (CABG)  2008    DILATION AND CURETTAGE OF UTERUS  2008    Miscarriage    ESOPHAGOGASTRODUODENOSCOPY N/A 07/11/2023    Procedure: EGD (ESOPHAGOGASTRODUODENOSCOPY);  Surgeon: Angy Ellis MD;  Location: [facility];  Service: Endoscopy;  Laterality: N/A;    INSERTION OF VENOUS ACCESS PORT Left     Power Port 2012- Left Chestwall placed in [place], [place]    LUNG REMOVAL, PARTIAL Right     2/3 of right lung removed; thought to be lung cancer, final path equals histoplasmosis    CHANDA-EN-Y PROCEDURE       Social History     Socioeconomic History    Marital status: Other   Tobacco Use    Smoking status: Never    Smokeless tobacco: Never    Tobacco comments:     Mother was a amoker.  I have used chewing tobacco   Substance and Sexual Activity    Alcohol use: Not Currently     Comment: occasion    Drug use: Never    Sexual activity: Not Currently     Partners: Male     Birth control/protection: Abstinence, None     Comment:       Social Determinants of Health     Financial Resource Strain: Medium Risk (9/19/2023)    Overall Financial Resource Strain (CARDIA)     Difficulty of Paying Living Expenses: Somewhat hard   Food Insecurity: No Food Insecurity (9/19/2023)    Hunger Vital Sign     Worried About Running Out of Food in the Last Year: Never true     Ran Out of Food in the Last Year: Never true   Transportation Needs: No Transportation Needs (9/19/2023)    PRAPARE - Transportation     Lack of Transportation (Medical): No     Lack of Transportation (Non-Medical): No   Physical Activity: Sufficiently Active (9/19/2023)    Exercise Vital Sign     Days of Exercise per Week: 5 days     Minutes of Exercise per Session: 30 min   Recent Concern: Physical Activity - Insufficiently Active (8/21/2023)    Exercise Vital Sign     Days of Exercise per Week: 3 days     Minutes of Exercise per Session: 30 min   Stress: Stress Concern Present (9/19/2023)    [employer] [employer] - Occupational  Stress Questionnaire     Feeling of Stress : To some extent   Social Connections: Moderately Integrated (2023)    Social Connection and Isolation Panel [NHANES]     Frequency of Communication with Friends and Family: More than three times a week     Frequency of Social Gatherings with Friends and Family: Once a week     Attends Yarsanism Services: More than 4 times per year     Active Member of Clubs or Organizations: No     Attends Club or Organization Meetings: More than 4 times per year     Marital Status:    Housing Stability: Low Risk  (2023)    Housing Stability Vital Sign     Unable to Pay for Housing in the Last Year: No     Number of Places Lived in the Last Year: 1     Unstable Housing in the Last Year: No     Family History   Problem Relation Age of Onset    Hypertension Mother     Rheum arthritis Mother     Cancer Mother     Diabetes Mother         Type 2 w/insulin    Lung cancer Mother         Lung cancer complications at 63    Heart attack Maternal Grandmother     Diabetes Maternal Grandmother         Brother Type 2    Hyperlipidemia Maternal Grandfather     Heart failure Maternal Grandfather     Heart failure Paternal Grandmother          from Heart Attack    Rheum arthritis Maternal Uncle         Blood sepsis    Cancer Maternal Aunt         Ovarian cancer    Cancer Maternal Aunt         Brain cancer    Migraines Brother     Diabetes Brother         Oral meds       Review of patient's allergies indicates:   Allergen Reactions    Aspirin Anaphylaxis     Tongue swelling      Cephalexin Anaphylaxis, Nausea And Vomiting and Hives    Iodine and iodide containing products Other (See Comments)     Pt is not allergic to contrast dye IV     Levofloxacin Anaphylaxis    Sulfa (sulfonamide antibiotics) Anaphylaxis, Blisters, Dermatitis, Itching and Shortness Of Breath    Sulfamethoxazole-trimethoprim Anaphylaxis     3rd degree skin burning when given IV      Sulfasalazine Anaphylaxis     Tramadol Nausea And Vomiting    Adhesive Dermatitis, Hives and Itching     bandaids and adhesive on electrodes  bandaids and adhesive on electrodes, whelps      Adhesive tape-silicones Itching    Barium iodide Nausea And Vomiting     Oral only causes vomiting       Current Outpatient Medications   Medication Sig    bumetanide (BUMEX) 1 MG tablet Take 1 tablet (1 mg total) by mouth once daily.    calcium carbonate (OS-ESE) 600 mg calcium (1,500 mg) Tab Take 600 mg by mouth.    cyanocobalamin 1,000 mcg/mL injection Inject 1 mL (1,000 mcg total) into the muscle every 30 days.    ergocalciferol, vitamin D2, (VITAMIN D ORAL)     ferrous sulfate (FEOSOL) 325 mg (65 mg iron) Tab tablet Take 325 mg by mouth.    gabapentin (NEURONTIN) 600 MG tablet Take 1 tablet (600 mg total) by mouth 3 (three) times daily.    iron,carb/vit C/vit B12/folic (IRON 100 PLUS ORAL)     pantoprazole (PROTONIX) 40 MG tablet Take 1 tablet (40 mg total) by mouth once daily.    potassium chloride (KLOR-CON) 20 mEq Pack Take 40 mEq by mouth once daily.    promethazine (PHENERGAN) 12.5 MG Supp Place 1 suppository (12.5 mg total) rectally every 6 (six) hours as needed.    promethazine (PHENERGAN) 25 MG tablet Take 1 tablet (25 mg total) by mouth every 6 (six) hours as needed for Nausea.    spironolactone (ALDACTONE) 25 MG tablet Take 1 tablet (25 mg total) by mouth once daily.    tiZANidine (ZANAFLEX) 4 MG tablet Take 2 tablets (8 mg total) by mouth 3 (three) times daily.    traZODone (DESYREL) 150 MG tablet Take 1 tablet (150 mg total) by mouth every evening.    venlafaxine (EFFEXOR-XR) 75 MG 24 hr capsule Take 1 capsule (75 mg total) by mouth once daily.    HYDROcodone-acetaminophen (NORCO) 5-325 mg per tablet Take 1 tablet by mouth every 8 (eight) hours as needed for Pain.     No current facility-administered medications for this visit.         ROS  Review of Systems   Constitutional:  Negative for chills, diaphoresis, fatigue and fever.   HENT:   "Negative for ear discharge, ear pain, rhinorrhea, trouble swallowing and voice change.    Eyes:  Negative for pain and redness.   Respiratory:  Negative for chest tightness, shortness of breath, wheezing and stridor.    Cardiovascular:  Negative for chest pain and leg swelling.   Gastrointestinal:  Positive for abdominal distention, abdominal pain and nausea. Negative for blood in stool, diarrhea and vomiting.   Endocrine: Negative for cold intolerance and heat intolerance.   Genitourinary:  Negative for dysuria, hematuria and urgency.   Musculoskeletal:  Positive for arthralgias and myalgias. Negative for back pain, gait problem, joint swelling, neck pain and neck stiffness.   Skin:  Negative for rash.   Neurological:  Negative for tremors, seizures, speech difficulty, weakness, light-headedness, numbness and headaches.   Hematological:  Does not bruise/bleed easily.   Psychiatric/Behavioral:  Negative for agitation, confusion and suicidal ideas.             OBJECTIVE:  BP (!) 110/58   Pulse (!) 59   Resp 14   Ht 6' 4.5" (1.943 m)   Wt 96.9 kg (213 lb 10 oz)   BMI 25.66 kg/m²         Physical Exam  Constitutional:       General: She is not in acute distress.     Appearance: Normal appearance. She is not ill-appearing.   HENT:      Head: Normocephalic and atraumatic.      Nose: No congestion or rhinorrhea.   Eyes:      Extraocular Movements: Extraocular movements intact.      Pupils: Pupils are equal, round, and reactive to light.   Cardiovascular:      Pulses: Normal pulses.   Pulmonary:      Effort: Pulmonary effort is normal.   Abdominal:      General: Abdomen is flat.      Comments: Midline laparotomy in incision is clean dry and intact.  Tenderness to deep palpation over the left upper quadrant.  Mild tenderness to left lower quadrant.  No significant tenderness over the right upper and lower quadrant.  No significant rebound tenderness   Skin:     General: Skin is warm and dry.      Capillary Refill: " Capillary refill takes less than 2 seconds.   Neurological:      General: No focal deficit present.      Mental Status: She is alert and oriented to person, place, and time.      Sensory: No sensory deficit.   Psychiatric:         Mood and Affect: Mood normal.         Behavior: Behavior normal.         Thought Content: Thought content normal.           LABS:  Lab Results   Component Value Date    WBC 6.32 01/31/2023    HGB 11.4 (L) 01/31/2023    HCT 36.8 (L) 01/31/2023    MCV 82 01/31/2023     01/31/2023       CMP  Sodium   Date Value Ref Range Status   01/31/2023 141 136 - 145 mmol/L Final     Potassium   Date Value Ref Range Status   01/31/2023 3.7 3.5 - 5.1 mmol/L Final     Chloride   Date Value Ref Range Status   01/31/2023 107 95 - 110 mmol/L Final     CO2   Date Value Ref Range Status   01/31/2023 25 23 - 29 mmol/L Final     Glucose   Date Value Ref Range Status   01/31/2023 148 (H) 70 - 110 mg/dL Final     BUN   Date Value Ref Range Status   01/31/2023 9 6 - 20 mg/dL Final     Creatinine   Date Value Ref Range Status   01/31/2023 0.7 0.5 - 1.4 mg/dL Final     Calcium   Date Value Ref Range Status   01/31/2023 9.1 8.7 - 10.5 mg/dL Final     Total Protein   Date Value Ref Range Status   01/31/2023 6.8 6.0 - 8.4 g/dL Final     Albumin   Date Value Ref Range Status   01/31/2023 3.8 3.5 - 5.2 g/dL Final     Total Bilirubin   Date Value Ref Range Status   01/31/2023 0.5 0.1 - 1.0 mg/dL Final     Comment:     For infants and newborns, interpretation of results should be based  on gestational age, weight and in agreement with clinical  observations.    Premature Infant recommended reference ranges:  Up to 24 hours.............<8.0 mg/dL  Up to 48 hours............<12.0 mg/dL  3-5 days..................<15.0 mg/dL  6-29 days.................<15.0 mg/dL       Alkaline Phosphatase   Date Value Ref Range Status   01/31/2023 142 (H) 55 - 135 U/L Final     AST   Date Value Ref Range Status   01/31/2023 18 10 - 40  U/L Final     ALT   Date Value Ref Range Status   01/31/2023 11 10 - 44 U/L Final     Anion Gap   Date Value Ref Range Status   01/31/2023 9 8 - 16 mmol/L Final     eGFR if    Date Value Ref Range Status   03/18/2022 >60.0 >60 mL/min/1.73 m^2 Final     eGFR if non    Date Value Ref Range Status   03/18/2022 >60.0 >60 mL/min/1.73 m^2 Final     Comment:     Calculation used to obtain the estimated glomerular filtration  rate (eGFR) is the CKD-EPI equation.          Lab Results   Component Value Date    HGBA1C 5.9 (H) 07/26/2023             ASSESSMENT:       48 y.o. year old female with abdominal pain, consistent with     1. Chronic abdominal pain  Ambulatory referral/consult to Pain Clinic    Case Request-RAD/Other Procedure Area: Left TRANSVERSUS ABDOMINIS PLANE Block under Ultrasound    HYDROcodone-acetaminophen (NORCO) 5-325 mg per tablet      2. Pain following surgery or procedure        3. History of double lobectomy of  right lung        4. Chronic pain disorder          Chronic abdominal pain  -     Ambulatory referral/consult to Pain Clinic  -     Case Request-RAD/Other Procedure Area: Left TRANSVERSUS ABDOMINIS PLANE Block under Ultrasound  -     HYDROcodone-acetaminophen (NORCO) 5-325 mg per tablet; Take 1 tablet by mouth every 8 (eight) hours as needed for Pain.  Dispense: 21 tablet; Refill: 0    Pain following surgery or procedure    History of double lobectomy of  right lung    Chronic pain disorder             PLAN:   - Interventions:   We will schedule patient for left transverse abdominis plane block for chronic left-sided abdominal pain secondary to multiple abdominal surgeries and left lobectomy.  Patient has undergone EGD and colonoscopy with no significant abnormality noted.    Can consider quadratus lumborum block versus peripheral nerve stimulation versus dorsal root ganglion stimulation if pain continues  - Anticoagulation use:   No no anticoagulation    -  Medications:   Continue gabapentin 600 mg 3 times a day   Continue tizanidine 4 mg 3 times a day p.r.n.   Start Norco 5mg PO q8h PRN, #21, no refills, 10/3/23. Patient understands that this medication is for breakthrough pain only, and should not be taken regularly.      - Therapy:    Continue abdominal therapy exercises from general surgery    - Imaging/Diagnostic:   CT of abdomen and pelvis    - Consults:   Reviewed continue follow up with Gastroenterology and General surgery for abdominal pain in patient with history of multiple previous abdominal surgeries        - Patient Questions: Answered all of the patient's questions regarding diagnosis, therapy, and treatment     This condition does not require this patient to take time off of work, and the primary goal of our Pain Management services is to improve the patient's functional capacity.     - Follow up visit: return to clinic 4 weeks after procedure        The above plan and management options were discussed at length with patient. Patient is in agreement with the above and verbalized understanding.    I discussed the goals of interventional chronic pain management with the patient on today's visit.  I explained the utility of injections for diagnostic and therapeutic purposes.  We discussed a multimodal approach to pain including treating the patient's given worst pain at any given time.  We will use a systematic approach to addressing pain.  We will also adopt a multimodal approach that includes injections, adjuvant medications, physical therapy, at times psychiatry.  There may be a limited role for opioid use intermittently in the treatment of pain, more particularly for acute pain although no one approach can be used as a sole treatment modality.    I emphasized the importance of regular exercise, core strengthening and stretching, diet and weight loss as a cornerstone of long-term pain management.      Jose De Luna MD  Interventional Pain  Management  [facility]    Disclaimer:  This note was prepared using voice recognition system and is likely to have sound alike errors that may have been overlooked even after proof reading.  Please call me with any questions

## 2023-10-03 NOTE — PROGRESS NOTES
New Patient Interventional Pain Note (Initial Visit)    Referring Physician: Lashonda Guerrero NP    PCP: Scott Escobar MD    Chief Complaint:     Chief Complaint   Patient presents with    Abdominal Pain        SUBJECTIVE:    Viri Carlson is a 48 y.o. female who presents to the clinic for the evaluation of abdominal pain.  Patient reports over 10 year history of abdominal pain.  Patient has undergone multiple abdominal surgeries after complications from gastric sleeve surgery including appendectomy, cholecystectomy, and multiple lysis of adhesions.  Patient has also undergone left lobe lobectomy.  Most recent lysis of adhesions was then 2019.  Pain is described as a aching throbbing pain primarily in the left upper quadrant of the abdomen.  Patient reports that as pain worsens this pain will radiate to the left lower quadrant and occasionally radiates to her right abdomen as well.  Patient reports pain is worse with certain foods including rice and with medications that lead to bowel changes, pain is typically better  with hydration.  Pain is currently rated a 5/10, but increase to a 10/10 with exacerbating activity. Denies any fevers, chills, changes in gait, saddle anesthesia, or bowel and bladder incontinence    Non-Pharmacologic Treatments:  Physical Therapy/Home Exercise: yes  Ice/Heat:yes  TENS: no  Acupuncture: no  Massage: yes  Chiropractic: no        Previous Pain Medications:  NSAIDs, Tylenol, muscle relaxers, neuropathics, opioids, topicals       report:  Reviewed and consistent with medication use as prescribed.    Pain Procedures:   None    Pain Disability Index Review:         10/3/2023     1:09 PM   Last 3 PDI Scores   Pain Disability Index (PDI) 43       Imaging:   CTA CHEST ABDOMEN PELVIS  Order: [id number]  Impression    Preliminary impression:   1.  Negative for intramural hematoma or aortic dissection/aneurysm.   2.  Status post CABG, cholecystectomy, gastric bypass surgery,  and   bowel anastomoses which are without change.   3.  Left common iliac and external vein stent is unchanged.   Colonic diverticulosis without diverticulitis.     ------THE FOLLOWING REPRESENTS THE FINAL ATTENDING REPORT------       Provided history:   45 years old Female with provided history of Thoracic aortic aneurysm   suspected, initial exam, Patient with a history of Marfan syndrome,   chest pain radiating towards back, negative troponins, pain not   relieved with medication.     Comparison:   CT chest 4/20/2020, CT abdomen pelvis 9/22/2019.     Technique:   Routine nonenhanced CT through the chest was performed. Subsequently,   enhanced CT through the chest, abdomen, and pelvis was performed in   the arterial phase of contrast per protocol. Multiplanar   reconstructions were provided. 3-D reconstructions were performed on   an independent workstation.     Dose reduction techniques were utilized for this exam including   automated exposure control, adjustments to mA and/or kV according to   patient's size, and the use of iterative reconstruction techniques.     Findings:     There is no thoracic aortic intramural hematoma, dissection, or   penetrating ulcer. The thoracic aorta and pulmonary artery are normal   in caliber. There is no aortic calcific atherosclerosis. Prior   sternotomy and coronary artery bypass grafting The proximal   supraaortic vessels are patent. There are no central pulmonary   arterial filling defects.     The abdominal aorta is normal in course and caliber without dissection   or penetrating ulcer. There is no calcific atherosclerosis of the   abdominal aorta and its proximal branches. A left iliac/common iliac   stent is present. Luminal evaluation of this stent is limited given   the arterial phase of contrast. The celiac, SMA, single right and   duplicated left renal, and HAZEL are patent.     The heart is normal in size. The pericardium is unremarkable. There is   no mediastinal or  hilar lymphadenopathy.     Bilateral lungs are clear of focal consolidation. There are no pleural   effusions or pneumothorax. There is minimal right basilar linear   parenchymal scarring versus atelectasis.     Previous gastric bypass and multiple small/large bowel anastomoses   with unremarkable-appearing suture beds and no associated obstruction.   Scattered colonic diverticulosis without diverticulitis.     Prior cholecystectomy with mild expected prominence of the biliary   system.     Liver, spleen, pancreas, adrenal glands, and kidneys are unremarkable.     Urinary bladder is unremarkable. Stable mildly enlarged multifibroid   uterus.     No free air or free fluid.     No acute osseous abnormalities.     Impression:   1.  Negative for acute aortic pathology.   2.  No acute findings of the chest, abdomen, or pelvis.      Past Medical History:   Diagnosis Date    Anemia 02/01/1990    Anxiety 08/01/2010    Depression 2008    Encounter for blood transfusion 01/15/2008    Endometriosis of uterus 05/01/1999    Fibroid 2010    Gestational diabetes     Heart disease     Heart murmur 01/14/2008    Hypertension 2009    Marfan syndrome     Varicose veins - Both legs      Past Surgical History:   Procedure Laterality Date    ABDOMINAL SURGERY  06/04/2006    several-had abscesses and complications after wt loss surgery; repair of small bowl injury; subsequent colon resection 6/14/19    APPENDECTOMY  2009    Rupture    BREAST BIOPSY Left 2019    CERVIX LESION DESTRUCTION      cryo for cervical dysplasia    CHOLECYSTECTOMY      COLON SURGERY  2006    COLONOSCOPY  2018    Normal    COLONOSCOPY N/A 12/13/2022    Procedure: COLONOSCOPY 10/29-card clearance received;  Surgeon: Ruben Chavez MD;  Location: [facility];  Service: Endoscopy;  Laterality: N/A;    COLONOSCOPY N/A 07/11/2023    Procedure: COLONOSCOPY;  Surgeon: Angy Ellis MD;  Location: [facility];  Service: Endoscopy;  Laterality: N/A;    CORONARY ARTERY  BYPASS GRAFT (CABG)  2008    DILATION AND CURETTAGE OF UTERUS  2008    Miscarriage    ESOPHAGOGASTRODUODENOSCOPY N/A 07/11/2023    Procedure: EGD (ESOPHAGOGASTRODUODENOSCOPY);  Surgeon: Angy Ellis MD;  Location: [facility];  Service: Endoscopy;  Laterality: N/A;    INSERTION OF VENOUS ACCESS PORT Left     Power Port 2012- Left Chestwall placed in [place], [place]    LUNG REMOVAL, PARTIAL Right     2/3 of right lung removed; thought to be lung cancer, final path equals histoplasmosis    CHANDA-EN-Y PROCEDURE       Social History     Socioeconomic History    Marital status: Other   Tobacco Use    Smoking status: Never    Smokeless tobacco: Never    Tobacco comments:     Mother was a amoker.  I have used chewing tobacco   Substance and Sexual Activity    Alcohol use: Not Currently     Comment: occasion    Drug use: Never    Sexual activity: Not Currently     Partners: Male     Birth control/protection: Abstinence, None     Comment:       Social Determinants of Health     Financial Resource Strain: Medium Risk (9/19/2023)    Overall Financial Resource Strain (CARDIA)     Difficulty of Paying Living Expenses: Somewhat hard   Food Insecurity: No Food Insecurity (9/19/2023)    Hunger Vital Sign     Worried About Running Out of Food in the Last Year: Never true     Ran Out of Food in the Last Year: Never true   Transportation Needs: No Transportation Needs (9/19/2023)    PRAPARE - Transportation     Lack of Transportation (Medical): No     Lack of Transportation (Non-Medical): No   Physical Activity: Sufficiently Active (9/19/2023)    Exercise Vital Sign     Days of Exercise per Week: 5 days     Minutes of Exercise per Session: 30 min   Recent Concern: Physical Activity - Insufficiently Active (8/21/2023)    Exercise Vital Sign     Days of Exercise per Week: 3 days     Minutes of Exercise per Session: 30 min   Stress: Stress Concern Present (9/19/2023)    [employer] [place] [employer] - Occupational  Stress Questionnaire     Feeling of Stress : To some extent   Social Connections: Moderately Integrated (2023)    Social Connection and Isolation Panel [NHANES]     Frequency of Communication with Friends and Family: More than three times a week     Frequency of Social Gatherings with Friends and Family: Once a week     Attends Mandaeism Services: More than 4 times per year     Active Member of Clubs or Organizations: No     Attends Club or Organization Meetings: More than 4 times per year     Marital Status:    Housing Stability: Low Risk  (2023)    Housing Stability Vital Sign     Unable to Pay for Housing in the Last Year: No     Number of Places Lived in the Last Year: 1     Unstable Housing in the Last Year: No     Family History   Problem Relation Age of Onset    Hypertension Mother     Rheum arthritis Mother     Cancer Mother     Diabetes Mother         Type 2 w/insulin    Lung cancer Mother         Lung cancer complications at 63    Heart attack Maternal Grandmother     Diabetes Maternal Grandmother         Brother Type 2    Hyperlipidemia Maternal Grandfather     Heart failure Maternal Grandfather     Heart failure Paternal Grandmother          from Heart Attack    Rheum arthritis Maternal Uncle         Blood sepsis    Cancer Maternal Aunt         Ovarian cancer    Cancer Maternal Aunt         Brain cancer    Migraines Brother     Diabetes Brother         Oral meds       Review of patient's allergies indicates:   Allergen Reactions    Aspirin Anaphylaxis     Tongue swelling      Cephalexin Anaphylaxis, Nausea And Vomiting and Hives    Iodine and iodide containing products Other (See Comments)     Pt is not allergic to contrast dye IV     Levofloxacin Anaphylaxis    Sulfa (sulfonamide antibiotics) Anaphylaxis, Blisters, Dermatitis, Itching and Shortness Of Breath    Sulfamethoxazole-trimethoprim Anaphylaxis     3rd degree skin burning when given IV      Sulfasalazine Anaphylaxis     Tramadol Nausea And Vomiting    Adhesive Dermatitis, Hives and Itching     bandaids and adhesive on electrodes  bandaids and adhesive on electrodes, whelps      Adhesive tape-silicones Itching    Barium iodide Nausea And Vomiting     Oral only causes vomiting       Current Outpatient Medications   Medication Sig    bumetanide (BUMEX) 1 MG tablet Take 1 tablet (1 mg total) by mouth once daily.    calcium carbonate (OS-ESE) 600 mg calcium (1,500 mg) Tab Take 600 mg by mouth.    cyanocobalamin 1,000 mcg/mL injection Inject 1 mL (1,000 mcg total) into the muscle every 30 days.    ergocalciferol, vitamin D2, (VITAMIN D ORAL)     ferrous sulfate (FEOSOL) 325 mg (65 mg iron) Tab tablet Take 325 mg by mouth.    gabapentin (NEURONTIN) 600 MG tablet Take 1 tablet (600 mg total) by mouth 3 (three) times daily.    iron,carb/vit C/vit B12/folic (IRON 100 PLUS ORAL)     pantoprazole (PROTONIX) 40 MG tablet Take 1 tablet (40 mg total) by mouth once daily.    potassium chloride (KLOR-CON) 20 mEq Pack Take 40 mEq by mouth once daily.    promethazine (PHENERGAN) 12.5 MG Supp Place 1 suppository (12.5 mg total) rectally every 6 (six) hours as needed.    promethazine (PHENERGAN) 25 MG tablet Take 1 tablet (25 mg total) by mouth every 6 (six) hours as needed for Nausea.    spironolactone (ALDACTONE) 25 MG tablet Take 1 tablet (25 mg total) by mouth once daily.    tiZANidine (ZANAFLEX) 4 MG tablet Take 2 tablets (8 mg total) by mouth 3 (three) times daily.    traZODone (DESYREL) 150 MG tablet Take 1 tablet (150 mg total) by mouth every evening.    venlafaxine (EFFEXOR-XR) 75 MG 24 hr capsule Take 1 capsule (75 mg total) by mouth once daily.    HYDROcodone-acetaminophen (NORCO) 5-325 mg per tablet Take 1 tablet by mouth every 8 (eight) hours as needed for Pain.     No current facility-administered medications for this visit.         ROS  Review of Systems   Constitutional:  Negative for chills, diaphoresis, fatigue and fever.   HENT:   "Negative for ear discharge, ear pain, rhinorrhea, trouble swallowing and voice change.    Eyes:  Negative for pain and redness.   Respiratory:  Negative for chest tightness, shortness of breath, wheezing and stridor.    Cardiovascular:  Negative for chest pain and leg swelling.   Gastrointestinal:  Positive for abdominal distention, abdominal pain and nausea. Negative for blood in stool, diarrhea and vomiting.   Endocrine: Negative for cold intolerance and heat intolerance.   Genitourinary:  Negative for dysuria, hematuria and urgency.   Musculoskeletal:  Positive for arthralgias and myalgias. Negative for back pain, gait problem, joint swelling, neck pain and neck stiffness.   Skin:  Negative for rash.   Neurological:  Negative for tremors, seizures, speech difficulty, weakness, light-headedness, numbness and headaches.   Hematological:  Does not bruise/bleed easily.   Psychiatric/Behavioral:  Negative for agitation, confusion and suicidal ideas.             OBJECTIVE:  BP (!) 110/58   Pulse (!) 59   Resp 14   Ht 6' 4.5" (1.943 m)   Wt 96.9 kg (213 lb 10 oz)   BMI 25.66 kg/m²         Physical Exam  Constitutional:       General: She is not in acute distress.     Appearance: Normal appearance. She is not ill-appearing.   HENT:      Head: Normocephalic and atraumatic.      Nose: No congestion or rhinorrhea.   Eyes:      Extraocular Movements: Extraocular movements intact.      Pupils: Pupils are equal, round, and reactive to light.   Cardiovascular:      Pulses: Normal pulses.   Pulmonary:      Effort: Pulmonary effort is normal.   Abdominal:      General: Abdomen is flat.      Comments: Midline laparotomy in incision is clean dry and intact.  Tenderness to deep palpation over the left upper quadrant.  Mild tenderness to left lower quadrant.  No significant tenderness over the right upper and lower quadrant.  No significant rebound tenderness   Skin:     General: Skin is warm and dry.      Capillary Refill: " Capillary refill takes less than 2 seconds.   Neurological:      General: No focal deficit present.      Mental Status: She is alert and oriented to person, place, and time.      Sensory: No sensory deficit.   Psychiatric:         Mood and Affect: Mood normal.         Behavior: Behavior normal.         Thought Content: Thought content normal.           LABS:  Lab Results   Component Value Date    WBC 6.32 01/31/2023    HGB 11.4 (L) 01/31/2023    HCT 36.8 (L) 01/31/2023    MCV 82 01/31/2023     01/31/2023       CMP  Sodium   Date Value Ref Range Status   01/31/2023 141 136 - 145 mmol/L Final     Potassium   Date Value Ref Range Status   01/31/2023 3.7 3.5 - 5.1 mmol/L Final     Chloride   Date Value Ref Range Status   01/31/2023 107 95 - 110 mmol/L Final     CO2   Date Value Ref Range Status   01/31/2023 25 23 - 29 mmol/L Final     Glucose   Date Value Ref Range Status   01/31/2023 148 (H) 70 - 110 mg/dL Final     BUN   Date Value Ref Range Status   01/31/2023 9 6 - 20 mg/dL Final     Creatinine   Date Value Ref Range Status   01/31/2023 0.7 0.5 - 1.4 mg/dL Final     Calcium   Date Value Ref Range Status   01/31/2023 9.1 8.7 - 10.5 mg/dL Final     Total Protein   Date Value Ref Range Status   01/31/2023 6.8 6.0 - 8.4 g/dL Final     Albumin   Date Value Ref Range Status   01/31/2023 3.8 3.5 - 5.2 g/dL Final     Total Bilirubin   Date Value Ref Range Status   01/31/2023 0.5 0.1 - 1.0 mg/dL Final     Comment:     For infants and newborns, interpretation of results should be based  on gestational age, weight and in agreement with clinical  observations.    Premature Infant recommended reference ranges:  Up to 24 hours.............<8.0 mg/dL  Up to 48 hours............<12.0 mg/dL  3-5 days..................<15.0 mg/dL  6-29 days.................<15.0 mg/dL       Alkaline Phosphatase   Date Value Ref Range Status   01/31/2023 142 (H) 55 - 135 U/L Final     AST   Date Value Ref Range Status   01/31/2023 18 10 - 40  U/L Final     ALT   Date Value Ref Range Status   01/31/2023 11 10 - 44 U/L Final     Anion Gap   Date Value Ref Range Status   01/31/2023 9 8 - 16 mmol/L Final     eGFR if    Date Value Ref Range Status   03/18/2022 >60.0 >60 mL/min/1.73 m^2 Final     eGFR if non    Date Value Ref Range Status   03/18/2022 >60.0 >60 mL/min/1.73 m^2 Final     Comment:     Calculation used to obtain the estimated glomerular filtration  rate (eGFR) is the CKD-EPI equation.          Lab Results   Component Value Date    HGBA1C 5.9 (H) 07/26/2023             ASSESSMENT:       48 y.o. year old female with abdominal pain, consistent with     1. Chronic abdominal pain  Ambulatory referral/consult to Pain Clinic    Case Request-RAD/Other Procedure Area: Left TRANSVERSUS ABDOMINIS PLANE Block under Ultrasound    HYDROcodone-acetaminophen (NORCO) 5-325 mg per tablet      2. Pain following surgery or procedure        3. History of double lobectomy of  right lung        4. Chronic pain disorder          Chronic abdominal pain  -     Ambulatory referral/consult to Pain Clinic  -     Case Request-RAD/Other Procedure Area: Left TRANSVERSUS ABDOMINIS PLANE Block under Ultrasound  -     HYDROcodone-acetaminophen (NORCO) 5-325 mg per tablet; Take 1 tablet by mouth every 8 (eight) hours as needed for Pain.  Dispense: 21 tablet; Refill: 0    Pain following surgery or procedure    History of double lobectomy of  right lung    Chronic pain disorder             PLAN:   - Interventions:   We will schedule patient for left transverse abdominis plane block for chronic left-sided abdominal pain secondary to multiple abdominal surgeries and left lobectomy.  Patient has undergone EGD and colonoscopy with no significant abnormality noted.    Can consider quadratus lumborum block versus peripheral nerve stimulation versus dorsal root ganglion stimulation if pain continues  - Anticoagulation use:   No no anticoagulation    -  Medications:   Continue gabapentin 600 mg 3 times a day   Continue tizanidine 4 mg 3 times a day p.r.n.   Start Norco 5mg PO q8h PRN, #21, no refills, 10/3/23. Patient understands that this medication is for breakthrough pain only, and should not be taken regularly.      - Therapy:    Continue abdominal therapy exercises from general surgery    - Imaging/Diagnostic:   CT of abdomen and pelvis    - Consults:   Reviewed continue follow up with Gastroenterology and General surgery for abdominal pain in patient with history of multiple previous abdominal surgeries        - Patient Questions: Answered all of the patient's questions regarding diagnosis, therapy, and treatment     This condition does not require this patient to take time off of work, and the primary goal of our Pain Management services is to improve the patient's functional capacity.     - Follow up visit: return to clinic 4 weeks after procedure        The above plan and management options were discussed at length with patient. Patient is in agreement with the above and verbalized understanding.    I discussed the goals of interventional chronic pain management with the patient on today's visit.  I explained the utility of injections for diagnostic and therapeutic purposes.  We discussed a multimodal approach to pain including treating the patient's given worst pain at any given time.  We will use a systematic approach to addressing pain.  We will also adopt a multimodal approach that includes injections, adjuvant medications, physical therapy, at times psychiatry.  There may be a limited role for opioid use intermittently in the treatment of pain, more particularly for acute pain although no one approach can be used as a sole treatment modality.    I emphasized the importance of regular exercise, core strengthening and stretching, diet and weight loss as a cornerstone of long-term pain management.      Jose De Luna MD  Interventional Pain  Management  [facility]    Disclaimer:  This note was prepared using voice recognition system and is likely to have sound alike errors that may have been overlooked even after proof reading.  Please call me with any questions

## 2023-10-04 NOTE — PRE-PROCEDURE INSTRUCTIONS
Spoke with patient regarding procedure scheduled on 10.9     Arrival time 0830     Has patient been sick with fever or on antibiotics within the last 7 days? No     Does the patient have any open wounds, sores or rashes? No     Does the patient have any recent fractures? no     Has patient received a vaccination within the last 7 days? No     Received the COVID vaccination? yes     Has the patient stopped all medications as directed? na     Does patient have a pacemaker, defibrillator, or implantable stimulator? No     Does the patient have a ride to and from procedure and someone reliable to remain with patient? coordinating transportation. Aware of policy.      Is the patient diabetic? pre     Does the patient have sleep apnea? Or use O2 at home? lj      Is the patient receiving sedation? yes     Is the patient instructed to remain NPO beginning at midnight the night before their procedure? yes     Procedure location confirmed with patient? Yes     Covid- Denies signs/symptoms. Instructed to notify PAT/MD if any changes.

## 2023-10-09 ENCOUNTER — HOSPITAL ENCOUNTER (OUTPATIENT)
Facility: HOSPITAL | Age: 48
Discharge: HOME | End: 2023-10-09
Attending: ANESTHESIOLOGY | Admitting: ANESTHESIOLOGY
Payer: MEDICARE

## 2023-10-09 VITALS
HEIGHT: 72 IN | TEMPERATURE: 97 F | SYSTOLIC BLOOD PRESSURE: 105 MMHG | OXYGEN SATURATION: 100 % | DIASTOLIC BLOOD PRESSURE: 59 MMHG | RESPIRATION RATE: 16 BRPM | WEIGHT: 210.44 LBS | HEART RATE: 57 BPM | BODY MASS INDEX: 28.5 KG/M2

## 2023-10-09 DIAGNOSIS — G89.29 CHRONIC ABDOMINAL PAIN: Primary | ICD-10-CM

## 2023-10-09 DIAGNOSIS — R10.9 CHRONIC ABDOMINAL PAIN: Primary | ICD-10-CM

## 2023-10-09 PROCEDURE — 64488 PR TAP BLOCK BILAT; BY INJECTION(S) INCL IMAG GUIDANCE: ICD-10-PCS | Mod: ,,, | Performed by: ANESTHESIOLOGY

## 2023-10-09 PROCEDURE — 64488 TAP BLOCK BI INJECTION: CPT | Mod: LT | Performed by: ANESTHESIOLOGY

## 2023-10-09 PROCEDURE — 63600175 PHARM REV CODE 636 W HCPCS: Performed by: ANESTHESIOLOGY

## 2023-10-09 PROCEDURE — 64488 TAP BLOCK BI INJECTION: CPT | Mod: ,,, | Performed by: ANESTHESIOLOGY

## 2023-10-09 RX ORDER — FENTANYL CITRATE 50 UG/ML
INJECTION, SOLUTION INTRAMUSCULAR; INTRAVENOUS
Status: DISCONTINUED | OUTPATIENT
Start: 2023-10-09 | End: 2023-10-09 | Stop reason: HOSPADM

## 2023-10-09 RX ORDER — DEXAMETHASONE SODIUM PHOSPHATE 10 MG/ML
INJECTION INTRAMUSCULAR; INTRAVENOUS
Status: DISCONTINUED | OUTPATIENT
Start: 2023-10-09 | End: 2023-10-09 | Stop reason: HOSPADM

## 2023-10-09 RX ORDER — BUPIVACAINE HYDROCHLORIDE 2.5 MG/ML
INJECTION, SOLUTION EPIDURAL; INFILTRATION; INTRACAUDAL
Status: DISCONTINUED | OUTPATIENT
Start: 2023-10-09 | End: 2023-10-09 | Stop reason: HOSPADM

## 2023-10-09 RX ORDER — MIDAZOLAM HYDROCHLORIDE 1 MG/ML
INJECTION, SOLUTION INTRAMUSCULAR; INTRAVENOUS
Status: DISCONTINUED | OUTPATIENT
Start: 2023-10-09 | End: 2023-10-09 | Stop reason: HOSPADM

## 2023-10-09 RX ORDER — ONDANSETRON 2 MG/ML
4 INJECTION INTRAMUSCULAR; INTRAVENOUS ONCE AS NEEDED
Status: DISCONTINUED | OUTPATIENT
Start: 2023-10-09 | End: 2023-10-09 | Stop reason: HOSPADM

## 2023-10-09 NOTE — OP NOTE
herapeutic Left Transversus Abdominis Plane Block with Ultrasound Guidance    The procedure, risks, benefits, and options were discussed with the patient. There are no contraindications to the procedure. The patent expressed understanding and agreed to the procedure. Informed written consent was obtained prior to the start of the procedure and can be found in the patient's chart.    PATIENT NAME: Viri Carlson   MRN: [id number]     DATE OF PROCEDURE: 10/09/2023    PROCEDURE: Diagnostic/Therapeutic Left Transversus Abdominis Plane Block with Ultrasound Guidance    PRE-OP DIAGNOSIS: Chronic abdominal pain [R10.9, G89.29]    POST-OP DIAGNOSIS: Same    PHYSICIAN: Jose De Luna MD       MEDICATIONS INJECTED: Preservative-free 1ml decadron 10mg/ml PF with 9cc of Bupivacaine 0.25%     LOCAL ANESTHETIC INJECTED: Xylocaine 2%     Sedation: Conscious sedation provided by M.D    SEDATION MEDICATIONS: local/IV sedation: Versed 2 mg and fentanyl 50 mcg IV.  Conscious sedation ordered by MD.  Patient reevaluated and sedation administered by MD and monitored by RN.  Total sedation time was less than 10 min.      ESTIMATED BLOOD LOSS: None    COMPLICATIONS: None    TECHNIQUE: Time-out was performed to identify the patient and procedure to be performed. With the patient laying in a lateral decubitus position on the stretcher, the surgical area was prepped and draped in the usual sterile fashion using ChloraPrep and a fenestrated drape. Ultrasound guidance was used to locate bowel, external oblique, internal oblique, and transversus abdominis muscles.  Skin anesthesia was achieved by injecting Lidocaine 2% over the injection site. A 22 gauge, 5 inch spinal quinke needle was used to engage the abdominal wall muscles. After negative pressure was applied to confirm no intravascular placement, 10 mL of the medication mixture listed above was injected slowly into the fascial plane between the transversus abdominis and internal oblique  muscles. The needle was removed and bleeding was nil. A sterile dressing was applied. No specimens collected. The patient tolerated the procedure well.     The patient was monitored after the procedure in the recovery area. They were given post-procedure and discharge instructions to follow at home. The patient was discharged in a stable condition.

## 2023-10-09 NOTE — DISCHARGE SUMMARY
Discharge Note  Short Stay      SUMMARY     Admit Date: 10/9/2023    Attending Physician: Jose De Luna MD        Discharge Physician: Jose De Luna MD        Discharge Date: 10/9/2023 9:33 AM    Procedure(s) (LRB):  Left TRANSVERSUS ABDOMINIS PLANE Block under Ultrasound (Left)    Final Diagnosis: Chronic abdominal pain [R10.9, G89.29]    Disposition: Home or self care    Patient Instructions:   Current Discharge Medication List        CONTINUE these medications which have NOT CHANGED    Details   bumetanide (BUMEX) 1 MG tablet Take 1 tablet (1 mg total) by mouth once daily.  Qty: 90 tablet, Refills: 3    Comments: .  Associated Diagnoses: Venous insufficiency      calcium carbonate (OS-ESE) 600 mg calcium (1,500 mg) Tab Take 600 mg by mouth.      cyanocobalamin 1,000 mcg/mL injection Inject 1 mL (1,000 mcg total) into the muscle every 30 days.  Qty: 3 mL, Refills: 3    Associated Diagnoses: B12 deficiency      ferrous sulfate (FEOSOL) 325 mg (65 mg iron) Tab tablet Take 325 mg by mouth.      gabapentin (NEURONTIN) 600 MG tablet Take 1 tablet (600 mg total) by mouth 3 (three) times daily.  Qty: 270 tablet, Refills: 3    Associated Diagnoses: Chronic abdominal pain      HYDROcodone-acetaminophen (NORCO) 5-325 mg per tablet Take 1 tablet by mouth every 8 (eight) hours as needed for Pain.  Qty: 21 tablet, Refills: 0    Comments: Quantity prescribed more than 7 day supply? Yes, quantity medically necessary  Associated Diagnoses: Chronic abdominal pain      iron,carb/vit C/vit B12/folic (IRON 100 PLUS ORAL)       pantoprazole (PROTONIX) 40 MG tablet Take 1 tablet (40 mg total) by mouth once daily.  Qty: 30 tablet, Refills: 11      potassium chloride (KLOR-CON) 20 mEq Pack Take 40 mEq by mouth once daily.  Qty: 90 packet, Refills: 3    Associated Diagnoses: Venous insufficiency      promethazine (PHENERGAN) 12.5 MG Supp Place 1 suppository (12.5 mg total) rectally every 6 (six) hours as needed.  Qty: 10  suppository, Refills: 0    Associated Diagnoses: Nausea and vomiting, unspecified vomiting type      promethazine (PHENERGAN) 25 MG tablet Take 1 tablet (25 mg total) by mouth every 6 (six) hours as needed for Nausea.  Qty: 20 tablet, Refills: 0    Associated Diagnoses: Nausea and vomiting, unspecified vomiting type      spironolactone (ALDACTONE) 25 MG tablet Take 1 tablet (25 mg total) by mouth once daily.  Qty: 90 tablet, Refills: 3    Comments: .  Associated Diagnoses: Essential (primary) hypertension      tiZANidine (ZANAFLEX) 4 MG tablet Take 2 tablets (8 mg total) by mouth 3 (three) times daily.  Qty: 540 tablet, Refills: 3    Associated Diagnoses: Chronic abdominal pain      traZODone (DESYREL) 150 MG tablet Take 1 tablet (150 mg total) by mouth every evening.  Qty: 90 tablet, Refills: 3    Associated Diagnoses: Anxiety and depression; Sleep difficulties      venlafaxine (EFFEXOR-XR) 75 MG 24 hr capsule Take 1 capsule (75 mg total) by mouth once daily.  Qty: 90 capsule, Refills: 3    Associated Diagnoses: Anxiety and depression      ergocalciferol, vitamin D2, (VITAMIN D ORAL)                  Discharge Diagnosis: Chronic abdominal pain [R10.9, G89.29]  Condition on Discharge: Stable with no complications to procedure   Diet on Discharge: Same as before.  Activity: as per instruction sheet.  Discharge to: Home with a responsible adult.  Follow up: 2-4 weeks       Please call the office at [phone] if you experience any weakness or loss of sensation, fever > 101.5, pain uncontrolled with oral medications, persistent nausea/vomiting/or diarrhea, redness or drainage from the incisions, or any other worrisome concerns. If physician on call was not reached or could not communicate with our office for any reason please go to the nearest emergency department

## 2023-10-09 NOTE — DISCHARGE INSTRUCTIONS

## 2023-10-18 ENCOUNTER — OFFICE VISIT (OUTPATIENT)
Dept: PRIMARY CARE CLINIC | Facility: CLINIC | Age: 48
End: 2023-10-18
Payer: MEDICARE

## 2023-10-18 VITALS
DIASTOLIC BLOOD PRESSURE: 72 MMHG | HEIGHT: 72 IN | WEIGHT: 210.13 LBS | SYSTOLIC BLOOD PRESSURE: 126 MMHG | TEMPERATURE: 98 F | HEART RATE: 63 BPM | BODY MASS INDEX: 28.46 KG/M2

## 2023-10-18 DIAGNOSIS — R79.9 ABNORMAL FINDING OF BLOOD CHEMISTRY, UNSPECIFIED: ICD-10-CM

## 2023-10-18 DIAGNOSIS — R10.9 CHRONIC ABDOMINAL PAIN: ICD-10-CM

## 2023-10-18 DIAGNOSIS — F41.9 ANXIETY AND DEPRESSION: ICD-10-CM

## 2023-10-18 DIAGNOSIS — I10 ESSENTIAL (PRIMARY) HYPERTENSION: Primary | ICD-10-CM

## 2023-10-18 DIAGNOSIS — Z23 NEED FOR PNEUMOCOCCAL VACCINE: ICD-10-CM

## 2023-10-18 DIAGNOSIS — F32.A ANXIETY AND DEPRESSION: ICD-10-CM

## 2023-10-18 DIAGNOSIS — G89.29 CHRONIC ABDOMINAL PAIN: ICD-10-CM

## 2023-10-18 DIAGNOSIS — G47.9 SLEEP DIFFICULTIES: ICD-10-CM

## 2023-10-18 PROCEDURE — 99999PBSHW FLU VACCINE (QUAD) GREATER THAN OR EQUAL TO 3YO PRESERVATIVE FREE IM: Mod: PBBFAC,,,

## 2023-10-18 PROCEDURE — 99215 PR OFFICE/OUTPT VISIT, EST, LEVL V, 40-54 MIN: ICD-10-PCS | Mod: S$PBB,,, | Performed by: FAMILY MEDICINE

## 2023-10-18 PROCEDURE — 99999 PR PBB SHADOW E&M-EST. PATIENT-LVL V: CPT | Mod: PBBFAC,,, | Performed by: FAMILY MEDICINE

## 2023-10-18 PROCEDURE — 99999PBSHW PNEUMOCOCCAL CONJUGATE VACCINE 20-VALENT: Mod: PBBFAC,,,

## 2023-10-18 PROCEDURE — 99999 PR PBB SHADOW E&M-EST. PATIENT-LVL V: ICD-10-PCS | Mod: PBBFAC,,, | Performed by: FAMILY MEDICINE

## 2023-10-18 PROCEDURE — 90677 PCV20 VACCINE IM: CPT | Mod: PBBFAC,PN

## 2023-10-18 PROCEDURE — 99999PBSHW PNEUMOCOCCAL CONJUGATE VACCINE 20-VALENT: ICD-10-PCS | Mod: PBBFAC,,,

## 2023-10-18 PROCEDURE — 99215 OFFICE O/P EST HI 40 MIN: CPT | Mod: PBBFAC,PN | Performed by: FAMILY MEDICINE

## 2023-10-18 PROCEDURE — 99215 OFFICE O/P EST HI 40 MIN: CPT | Mod: S$PBB,,, | Performed by: FAMILY MEDICINE

## 2023-10-18 PROCEDURE — G0008 ADMIN INFLUENZA VIRUS VAC: HCPCS | Mod: PBBFAC,PN
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
## 2023-10-18 NOTE — PATIENT INSTRUCTIONS
Physically, everything looks pretty good today.      I would like to get some screening blood work done.  Prefer you to be fasting.  I will place orders today.  Feel free to come by the clinic one day this week, or next week, at your convenience to get the blood drawn.  We will contact you with results soon as they are available.    Flu shot and pneumonia shot updated today.      Continue taking all of your medications, as prescribed.      Keep your follow-up appointment with the counselor next week.      Continue to eat a healthy diet.  Be careful with portion sizes.  Includes lots of fresh fruits, vegetables, whole grains, lean proteins.  See info below.    Keep hydrated.  Be sure to drink at least 8-10, 8 oz, glasses of water every day.    Stay active.  Try to do some sort of physical activity every day.  Nothing outrageous, just try walking for 10-15 minutes each day.

## 2023-10-25 ENCOUNTER — PATIENT MESSAGE (OUTPATIENT)
Dept: PSYCHIATRY | Facility: CLINIC | Age: 48
End: 2023-10-25
Payer: MEDICARE

## 2023-10-30 ENCOUNTER — OFFICE VISIT (OUTPATIENT)
Dept: PSYCHIATRY | Facility: CLINIC | Age: 48
End: 2023-10-30
Payer: MEDICARE

## 2023-10-30 DIAGNOSIS — F32.A ANXIETY AND DEPRESSION: Primary | ICD-10-CM

## 2023-10-30 DIAGNOSIS — F41.9 ANXIETY AND DEPRESSION: Primary | ICD-10-CM

## 2023-10-30 DIAGNOSIS — T74.91XA VICTIM OF INTIMATE PARTNER ABUSE: ICD-10-CM

## 2023-10-30 PROCEDURE — 90834 PSYTX W PT 45 MINUTES: CPT | Mod: 95,,, | Performed by: SOCIAL WORKER

## 2023-10-30 PROCEDURE — 90834 PR PSYCHOTHERAPY W/PATIENT, 45 MIN: ICD-10-PCS | Mod: 95,,, | Performed by: SOCIAL WORKER
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
## 2023-11-22 ENCOUNTER — PATIENT MESSAGE (OUTPATIENT)
Dept: PSYCHIATRY | Facility: CLINIC | Age: 48
End: 2023-11-22
Payer: MEDICARE

## 2023-11-29 ENCOUNTER — PATIENT MESSAGE (OUTPATIENT)
Dept: ADMINISTRATIVE | Facility: OTHER | Age: 48
End: 2023-11-29
Payer: MEDICARE

## 2023-12-05 ENCOUNTER — TELEPHONE (OUTPATIENT)
Dept: PAIN MEDICINE | Facility: CLINIC | Age: 48
End: 2023-12-05
Payer: MEDICARE

## 2023-12-05 ENCOUNTER — OFFICE VISIT (OUTPATIENT)
Dept: PAIN MEDICINE | Facility: CLINIC | Age: 48
End: 2023-12-05
Payer: MEDICARE

## 2023-12-05 VITALS
SYSTOLIC BLOOD PRESSURE: 130 MMHG | DIASTOLIC BLOOD PRESSURE: 84 MMHG | WEIGHT: 211.88 LBS | RESPIRATION RATE: 18 BRPM | BODY MASS INDEX: 28.7 KG/M2 | HEART RATE: 71 BPM | HEIGHT: 72 IN

## 2023-12-05 DIAGNOSIS — G89.29 CHRONIC ABDOMINAL PAIN: ICD-10-CM

## 2023-12-05 DIAGNOSIS — R10.9 CHRONIC ABDOMINAL PAIN: ICD-10-CM

## 2023-12-05 PROCEDURE — 99214 OFFICE O/P EST MOD 30 MIN: CPT | Mod: S$PBB,,, | Performed by: ANESTHESIOLOGY

## 2023-12-05 PROCEDURE — 99213 OFFICE O/P EST LOW 20 MIN: CPT | Mod: PBBFAC,PN | Performed by: ANESTHESIOLOGY

## 2023-12-05 PROCEDURE — 99214 PR OFFICE/OUTPT VISIT, EST, LEVL IV, 30-39 MIN: ICD-10-PCS | Mod: S$PBB,,, | Performed by: ANESTHESIOLOGY

## 2023-12-05 PROCEDURE — 99999 PR PBB SHADOW E&M-EST. PATIENT-LVL III: CPT | Mod: PBBFAC,,, | Performed by: ANESTHESIOLOGY

## 2023-12-05 PROCEDURE — 99999 PR PBB SHADOW E&M-EST. PATIENT-LVL III: ICD-10-PCS | Mod: PBBFAC,,, | Performed by: ANESTHESIOLOGY

## 2023-12-05 RX ORDER — HYDROCODONE BITARTRATE AND ACETAMINOPHEN 5; 325 MG/1; MG/1
1 TABLET ORAL EVERY 8 HOURS PRN
Qty: 21 TABLET | Refills: 0 | Status: SHIPPED | OUTPATIENT
Start: 2023-12-05

## 2023-12-06 NOTE — PROGRESS NOTES
Interventional Pain Progress Note       Referring Physician: No ref. provider found    PCP: Scott Escobar MD    Chief Complaint:     Chief Complaint   Patient presents with    Abdominal Pain     Patient received 75% relief from injection.  Patient has lower abdominal pain not severe.  Pain scale 3/10        SUBJECTIVE:    Interval History (12/06/2023):  Patient returns to clinic after procedure.  Patient reports 75% relief in abdominal pain after left transversus abdominis plane block on 10/19/2023.  Patient reports that sharp pain over left abdomen has significantly improved.  Patient reports that she is now able to eat more foods after this injection.  Patient reports she still can not eat rice without significant abdominal pain.  Pain is currently rated a 2/10.      Initial HPI:     Viri Carlson is a 48 y.o. female who presents to the clinic for the evaluation of abdominal pain.  Patient reports over 10 year history of abdominal pain.  Patient has undergone multiple abdominal surgeries after complications from gastric sleeve surgery including appendectomy, cholecystectomy, and multiple lysis of adhesions.  Patient has also undergone left lobe lobectomy.  Most recent lysis of adhesions was then 2019.  Pain is described as a aching throbbing pain primarily in the left upper quadrant of the abdomen.  Patient reports that as pain worsens this pain will radiate to the left lower quadrant and occasionally radiates to her right abdomen as well.  Patient reports pain is worse with certain foods including rice and with medications that lead to bowel changes, pain is typically better  with hydration.  Pain is currently rated a 5/10, but increase to a 10/10 with exacerbating activity. Denies any fevers, chills, changes in gait, saddle anesthesia, or bowel and bladder incontinence    Non-Pharmacologic Treatments:  Physical Therapy/Home Exercise: yes  Ice/Heat:yes  TENS: no  Acupuncture: no  Massage:  yes  Chiropractic: no        Previous Pain Medications:  NSAIDs, Tylenol, muscle relaxers, neuropathics, opioids, topicals       report:  Reviewed and consistent with medication use as prescribed.    Pain Procedures:   -10/19/2023:  Left transversus abdominis plane block, 75% relief    Pain Disability Index Review:         12/5/2023     2:00 PM 10/3/2023     1:09 PM   Last 3 PDI Scores   Pain Disability Index (PDI) 42 43       Imaging:   CTA CHEST ABDOMEN PELVIS  Order: [id number]  Impression    Preliminary impression:   1.  Negative for intramural hematoma or aortic dissection/aneurysm.   2.  Status post CABG, cholecystectomy, gastric bypass surgery, and   bowel anastomoses which are without change.   3.  Left common iliac and external vein stent is unchanged.   Colonic diverticulosis without diverticulitis.     ------THE FOLLOWING REPRESENTS THE FINAL ATTENDING REPORT------       Provided history:   45 years old Female with provided history of Thoracic aortic aneurysm   suspected, initial exam, Patient with a history of Marfan syndrome,   chest pain radiating towards back, negative troponins, pain not   relieved with medication.     Comparison:   CT chest 4/20/2020, CT abdomen pelvis 9/22/2019.     Technique:   Routine nonenhanced CT through the chest was performed. Subsequently,   enhanced CT through the chest, abdomen, and pelvis was performed in   the arterial phase of contrast per protocol. Multiplanar   reconstructions were provided. 3-D reconstructions were performed on   an independent workstation.     Dose reduction techniques were utilized for this exam including   automated exposure control, adjustments to mA and/or kV according to   patient's size, and the use of iterative reconstruction techniques.     Findings:     There is no thoracic aortic intramural hematoma, dissection, or   penetrating ulcer. The thoracic aorta and pulmonary artery are normal   in caliber. There is no aortic calcific  atherosclerosis. Prior   sternotomy and coronary artery bypass grafting The proximal   supraaortic vessels are patent. There are no central pulmonary   arterial filling defects.     The abdominal aorta is normal in course and caliber without dissection   or penetrating ulcer. There is no calcific atherosclerosis of the   abdominal aorta and its proximal branches. A left iliac/common iliac   stent is present. Luminal evaluation of this stent is limited given   the arterial phase of contrast. The celiac, SMA, single right and   duplicated left renal, and HAZEL are patent.     The heart is normal in size. The pericardium is unremarkable. There is   no mediastinal or hilar lymphadenopathy.     Bilateral lungs are clear of focal consolidation. There are no pleural   effusions or pneumothorax. There is minimal right basilar linear   parenchymal scarring versus atelectasis.     Previous gastric bypass and multiple small/large bowel anastomoses   with unremarkable-appearing suture beds and no associated obstruction.   Scattered colonic diverticulosis without diverticulitis.     Prior cholecystectomy with mild expected prominence of the biliary   system.     Liver, spleen, pancreas, adrenal glands, and kidneys are unremarkable.     Urinary bladder is unremarkable. Stable mildly enlarged multifibroid   uterus.     No free air or free fluid.     No acute osseous abnormalities.     Impression:   1.  Negative for acute aortic pathology.   2.  No acute findings of the chest, abdomen, or pelvis.      Past Medical History:   Diagnosis Date    Anemia 02/01/1990    Anxiety 08/01/2010    Depression 2008    Encounter for blood transfusion 01/15/2008    Endometriosis of uterus 05/01/1999    Fibroid 2010    Gestational diabetes     Heart disease     Heart murmur 01/14/2008    Hypertension 2009    Marfan syndrome     Varicose veins - Both legs      Past Surgical History:   Procedure Laterality Date    ABDOMINAL SURGERY  06/04/2006     several-had abscesses and complications after wt loss surgery; repair of small bowl injury; subsequent colon resection 6/14/19    APPENDECTOMY  2009    Rupture    BREAST BIOPSY Left 2019    CERVIX LESION DESTRUCTION      cryo for cervical dysplasia    CHOLECYSTECTOMY      COLON SURGERY  2006    COLONOSCOPY  2018    Normal    COLONOSCOPY N/A 12/13/2022    Procedure: COLONOSCOPY 10/29-card clearance received;  Surgeon: Ruben Chavez MD;  Location: [facility];  Service: Endoscopy;  Laterality: N/A;    COLONOSCOPY N/A 07/11/2023    Procedure: COLONOSCOPY;  Surgeon: Angy Ellis MD;  Location: [facility];  Service: Endoscopy;  Laterality: N/A;    CORONARY ARTERY BYPASS GRAFT (CABG)  2008    DILATION AND CURETTAGE OF UTERUS  2008    Miscarriage    ESOPHAGOGASTRODUODENOSCOPY N/A 07/11/2023    Procedure: EGD (ESOPHAGOGASTRODUODENOSCOPY);  Surgeon: Angy Ellis MD;  Location: [facility];  Service: Endoscopy;  Laterality: N/A;    INJECTION OF ANESTHETIC AGENT INTO TISSUE PLANE DEFINED BY TRANSVERSUS ABDOMINIS MUSCLE Left 10/9/2023    Procedure: Left TRANSVERSUS ABDOMINIS PLANE Block under Ultrasound;  Surgeon: Jose De Luna MD;  Location: [facility];  Service: Pain Management;  Laterality: Left;    INSERTION OF VENOUS ACCESS PORT Left     Power Port 2012- Left Chestwall placed in [place]    LUNG REMOVAL, PARTIAL Right     2/3 of right lung removed; thought to be lung cancer, final path equals histoplasmosis    CHANDA-EN-Y PROCEDURE       Social History     Socioeconomic History    Marital status: Other   Tobacco Use    Smoking status: Never    Smokeless tobacco: Never    Tobacco comments:     Mother was a amoker.  I have used chewing tobacco   Substance and Sexual Activity    Alcohol use: Not Currently     Comment: occasion    Drug use: Never    Sexual activity: Not Currently     Partners: Male     Birth control/protection: Abstinence, None     Comment:       Social Determinants of Health      Financial Resource Strain: Medium Risk (9/19/2023)    Overall Financial Resource Strain (CARDIA)     Difficulty of Paying Living Expenses: Somewhat hard   Food Insecurity: No Food Insecurity (9/19/2023)    Hunger Vital Sign     Worried About Running Out of Food in the Last Year: Never true     Ran Out of Food in the Last Year: Never true   Transportation Needs: No Transportation Needs (9/19/2023)    PRAPARE - Transportation     Lack of Transportation (Medical): No     Lack of Transportation (Non-Medical): No   Physical Activity: Sufficiently Active (9/19/2023)    Exercise Vital Sign     Days of Exercise per Week: 5 days     Minutes of Exercise per Session: 30 min   Recent Concern: Physical Activity - Insufficiently Active (8/21/2023)    Exercise Vital Sign     Days of Exercise per Week: 3 days     Minutes of Exercise per Session: 30 min   Stress: Stress Concern Present (9/19/2023)    [employer] - Occupational Stress Questionnaire     Feeling of Stress : To some extent   Social Connections: Moderately Integrated (9/19/2023)    Social Connection and Isolation Panel [NHANES]     Frequency of Communication with Friends and Family: More than three times a week     Frequency of Social Gatherings with Friends and Family: Once a week     Attends Religion Services: More than 4 times per year     Active Member of Clubs or Organizations: No     Attends Club or Organization Meetings: More than 4 times per year     Marital Status:    Housing Stability: Low Risk  (9/19/2023)    Housing Stability Vital Sign     Unable to Pay for Housing in the Last Year: No     Number of Places Lived in the Last Year: 1     Unstable Housing in the Last Year: No     Family History   Problem Relation Age of Onset    Hypertension Mother     Rheum arthritis Mother     Cancer Mother     Diabetes Mother         Type 2 w/insulin    Lung cancer Mother         Lung cancer complications at 63    Heart attack  Maternal Grandmother     Diabetes Maternal Grandmother         Brother Type 2    Hyperlipidemia Maternal Grandfather     Heart failure Maternal Grandfather     Heart failure Paternal Grandmother          from Heart Attack    Rheum arthritis Maternal Uncle         Blood sepsis    Cancer Maternal Aunt         Ovarian cancer    Cancer Maternal Aunt         Brain cancer    Migraines Brother     Diabetes Brother         Oral meds       Review of patient's allergies indicates:   Allergen Reactions    Aspirin Anaphylaxis and Swelling     Tongue swelling      Cephalexin Anaphylaxis, Nausea And Vomiting, Hives and Other (See Comments)    Iodine and iodide containing products Other (See Comments)     Pt is not allergic to contrast dye IV     Levofloxacin Anaphylaxis    Sulfa (sulfonamide antibiotics) Anaphylaxis, Blisters, Dermatitis, Itching, Shortness Of Breath and Other (See Comments)    Sulfamethoxazole-trimethoprim Anaphylaxis     3rd degree skin burning when given IV      Sulfasalazine Anaphylaxis    Tramadol Nausea And Vomiting    Adhesive Dermatitis, Hives, Itching and Other (See Comments)     bandaids and adhesive on electrodes  bandaids and adhesive on electrodes, whelps      Adhesive tape-silicones Itching    Barium iodide Nausea And Vomiting     Oral only causes vomiting       Current Outpatient Medications   Medication Sig    bumetanide (BUMEX) 1 MG tablet Take 1 tablet (1 mg total) by mouth once daily.    calcium carbonate (OS-ESE) 600 mg calcium (1,500 mg) Tab Take 600 mg by mouth.    cyanocobalamin 1,000 mcg/mL injection Inject 1 mL (1,000 mcg total) into the muscle every 30 days.    ergocalciferol, vitamin D2, (VITAMIN D ORAL)     ferrous sulfate (FEOSOL) 325 mg (65 mg iron) Tab tablet Take 325 mg by mouth.    gabapentin (NEURONTIN) 600 MG tablet Take 1 tablet (600 mg total) by mouth 3 (three) times daily.    iron,carb/vit C/vit B12/folic (IRON 100 PLUS ORAL)     pantoprazole (PROTONIX) 40 MG tablet Take  "1 tablet (40 mg total) by mouth once daily.    potassium chloride (KLOR-CON) 20 mEq Pack Take 40 mEq by mouth once daily.    promethazine (PHENERGAN) 12.5 MG Supp Place 1 suppository (12.5 mg total) rectally every 6 (six) hours as needed.    promethazine (PHENERGAN) 25 MG tablet Take 1 tablet (25 mg total) by mouth every 6 (six) hours as needed for Nausea.    spironolactone (ALDACTONE) 25 MG tablet Take 1 tablet (25 mg total) by mouth once daily.    tiZANidine (ZANAFLEX) 4 MG tablet Take 2 tablets (8 mg total) by mouth 3 (three) times daily.    traZODone (DESYREL) 150 MG tablet Take 1 tablet (150 mg total) by mouth every evening.    venlafaxine (EFFEXOR-XR) 75 MG 24 hr capsule Take 1 capsule (75 mg total) by mouth once daily.    HYDROcodone-acetaminophen (NORCO) 5-325 mg per tablet Take 1 tablet by mouth every 8 (eight) hours as needed for Pain.     No current facility-administered medications for this visit.         ROS  Review of Systems   Constitutional:  Negative for chills, diaphoresis, fatigue and fever.   Respiratory:  Negative for chest tightness, shortness of breath, wheezing and stridor.    Cardiovascular:  Negative for chest pain and leg swelling.   Gastrointestinal:  Positive for abdominal distention, abdominal pain and nausea. Negative for blood in stool, diarrhea and vomiting.   Endocrine: Negative for cold intolerance and heat intolerance.   Genitourinary:  Negative for dysuria, hematuria and urgency.   Musculoskeletal:  Positive for arthralgias and myalgias. Negative for back pain, gait problem, joint swelling, neck pain and neck stiffness.   Skin:  Negative for rash.   Neurological:  Negative for tremors, seizures, speech difficulty, weakness, light-headedness, numbness and headaches.   Hematological:  Does not bruise/bleed easily.   Psychiatric/Behavioral:  Negative for agitation, confusion and suicidal ideas.             OBJECTIVE:  /84   Pulse 71   Resp 18   Ht 6' 4" (1.93 m)   Wt 96.1 " kg (211 lb 13.8 oz)   BMI 25.79 kg/m²         Physical Exam  Constitutional:       General: She is not in acute distress.     Appearance: Normal appearance. She is not ill-appearing.   HENT:      Head: Normocephalic and atraumatic.      Nose: No congestion or rhinorrhea.   Eyes:      Extraocular Movements: Extraocular movements intact.      Pupils: Pupils are equal, round, and reactive to light.   Pulmonary:      Effort: Pulmonary effort is normal.   Abdominal:      Comments: Midline laparotomy in incision is clean dry and intact.  Tenderness over left abdomen improved compared to prior exam.  No significant tenderness over the right upper and lower quadrant.  No significant rebound tenderness   Skin:     General: Skin is warm and dry.      Capillary Refill: Capillary refill takes less than 2 seconds.   Neurological:      General: No focal deficit present.      Mental Status: She is alert and oriented to person, place, and time.      Sensory: No sensory deficit.   Psychiatric:         Mood and Affect: Mood normal.         Behavior: Behavior normal.         Thought Content: Thought content normal.           LABS:  Lab Results   Component Value Date    WBC 6.32 01/31/2023    HGB 11.4 (L) 01/31/2023    HCT 36.8 (L) 01/31/2023    MCV 82 01/31/2023     01/31/2023       CMP  Sodium   Date Value Ref Range Status   01/31/2023 141 136 - 145 mmol/L Final     Potassium   Date Value Ref Range Status   01/31/2023 3.7 3.5 - 5.1 mmol/L Final     Chloride   Date Value Ref Range Status   01/31/2023 107 95 - 110 mmol/L Final     CO2   Date Value Ref Range Status   01/31/2023 25 23 - 29 mmol/L Final     Glucose   Date Value Ref Range Status   01/31/2023 148 (H) 70 - 110 mg/dL Final     BUN   Date Value Ref Range Status   01/31/2023 9 6 - 20 mg/dL Final     Creatinine   Date Value Ref Range Status   01/31/2023 0.7 0.5 - 1.4 mg/dL Final     Calcium   Date Value Ref Range Status   01/31/2023 9.1 8.7 - 10.5 mg/dL Final     Total  Protein   Date Value Ref Range Status   01/31/2023 6.8 6.0 - 8.4 g/dL Final     Albumin   Date Value Ref Range Status   01/31/2023 3.8 3.5 - 5.2 g/dL Final     Total Bilirubin   Date Value Ref Range Status   01/31/2023 0.5 0.1 - 1.0 mg/dL Final     Comment:     For infants and newborns, interpretation of results should be based  on gestational age, weight and in agreement with clinical  observations.    Premature Infant recommended reference ranges:  Up to 24 hours.............<8.0 mg/dL  Up to 48 hours............<12.0 mg/dL  3-5 days..................<15.0 mg/dL  6-29 days.................<15.0 mg/dL       Alkaline Phosphatase   Date Value Ref Range Status   01/31/2023 142 (H) 55 - 135 U/L Final     AST   Date Value Ref Range Status   01/31/2023 18 10 - 40 U/L Final     ALT   Date Value Ref Range Status   01/31/2023 11 10 - 44 U/L Final     Anion Gap   Date Value Ref Range Status   01/31/2023 9 8 - 16 mmol/L Final     eGFR if    Date Value Ref Range Status   03/18/2022 >60.0 >60 mL/min/1.73 m^2 Final     eGFR if non    Date Value Ref Range Status   03/18/2022 >60.0 >60 mL/min/1.73 m^2 Final     Comment:     Calculation used to obtain the estimated glomerular filtration  rate (eGFR) is the CKD-EPI equation.          Lab Results   Component Value Date    HGBA1C 5.9 (H) 07/26/2023             ASSESSMENT:       48 y.o. year old female with abdominal pain, consistent with     1. Chronic abdominal pain  HYDROcodone-acetaminophen (NORCO) 5-325 mg per tablet        Chronic abdominal pain  -     HYDROcodone-acetaminophen (NORCO) 5-325 mg per tablet; Take 1 tablet by mouth every 8 (eight) hours as needed for Pain.  Dispense: 21 tablet; Refill: 0             PLAN:   - Interventions:    None at this time.  Can consider repeat transverse abdominis plane block if pain returns.  -10/19/2023:  Left transversus abdominis plane block, 75% relief  Can consider quadratus lumborum block versus  peripheral nerve stimulation versus dorsal root ganglion stimulation if pain continues  - Anticoagulation use:   No no anticoagulation    - Medications:   Continue gabapentin 600 mg 3 times a day   Continue tizanidine 4 mg 3 times a day p.r.n.   Refill Norco 5mg PO q8h PRN, #21, no refills, 12/6/23/23. Patient understands that this medication is for breakthrough pain only, and should not be taken regularly.      - Therapy:    Continue abdominal therapy exercises from general surgery    - Imaging/Diagnostic:   CT of abdomen and pelvis    - Consults:   Reviewed continue follow up with Gastroenterology and General surgery for abdominal pain in patient with history of multiple previous abdominal surgeries        - Patient Questions: Answered all of the patient's questions regarding diagnosis, therapy, and treatment     This condition does not require this patient to take time off of work, and the primary goal of our Pain Management services is to improve the patient's functional capacity.     - Follow up visit: return to clinic in 10 weeks or sooner if needed        The above plan and management options were discussed at length with patient. Patient is in agreement with the above and verbalized understanding.    I discussed the goals of interventional chronic pain management with the patient on today's visit.  I explained the utility of injections for diagnostic and therapeutic purposes.  We discussed a multimodal approach to pain including treating the patient's given worst pain at any given time.  We will use a systematic approach to addressing pain.  We will also adopt a multimodal approach that includes injections, adjuvant medications, physical therapy, at times psychiatry.  There may be a limited role for opioid use intermittently in the treatment of pain, more particularly for acute pain although no one approach can be used as a sole treatment modality.    I emphasized the importance of regular exercise, core  strengthening and stretching, diet and weight loss as a cornerstone of long-term pain management.      Jose De Luna MD  Interventional Pain Management  [facility]    Disclaimer:  This note was prepared using voice recognition system and is likely to have sound alike errors that may have been overlooked even after proof reading.  Please call me with any questions

## 2023-12-13 ENCOUNTER — TELEPHONE (OUTPATIENT)
Dept: PSYCHIATRY | Facility: CLINIC | Age: 48
End: 2023-12-13
Payer: MEDICARE

## 2023-12-14 ENCOUNTER — OFFICE VISIT (OUTPATIENT)
Dept: PSYCHIATRY | Facility: CLINIC | Age: 48
End: 2023-12-14
Payer: MEDICARE

## 2023-12-14 ENCOUNTER — TELEPHONE (OUTPATIENT)
Dept: PSYCHIATRY | Facility: CLINIC | Age: 48
End: 2023-12-14
Payer: MEDICARE

## 2023-12-14 DIAGNOSIS — F41.9 ANXIETY AND DEPRESSION: Primary | ICD-10-CM

## 2023-12-14 DIAGNOSIS — F32.A ANXIETY AND DEPRESSION: Primary | ICD-10-CM

## 2023-12-14 PROCEDURE — 99499 UNLISTED E&M SERVICE: CPT | Mod: 95,,, | Performed by: SOCIAL WORKER

## 2023-12-14 PROCEDURE — 99499 NO LOS: ICD-10-PCS | Mod: 95,,, | Performed by: SOCIAL WORKER

## 2023-12-14 NOTE — TELEPHONE ENCOUNTER
Called to offer pt an appt tomorrow, 12/15/23 with Deysi Zuluaga LCSW at 3 pm virtually. Pt was unable to complete visit today due to being out of state. LVM.

## 2023-12-22 NOTE — PROGRESS NOTES
At the start of the visit, pt informed me that she was located in [place], [place]. I explained to pt that since I am not a licensed provider in that state, we would not be able to continue the visit. She denied SI/HI, plan or intent and verbalized understanding. I will see her when she is back in [place]. No LOS.

## 2024-01-18 ENCOUNTER — TELEPHONE (OUTPATIENT)
Dept: PSYCHIATRY | Facility: CLINIC | Age: 49
End: 2024-01-18
Payer: MEDICARE

## 2024-01-22 ENCOUNTER — OFFICE VISIT (OUTPATIENT)
Dept: PSYCHIATRY | Facility: CLINIC | Age: 49
End: 2024-01-22
Payer: MEDICARE

## 2024-01-22 DIAGNOSIS — F41.9 ANXIETY AND DEPRESSION: Primary | ICD-10-CM

## 2024-01-22 DIAGNOSIS — F32.A ANXIETY AND DEPRESSION: Primary | ICD-10-CM

## 2024-01-22 DIAGNOSIS — T74.91XA VICTIM OF INTIMATE PARTNER ABUSE: ICD-10-CM

## 2024-01-22 PROCEDURE — 90837 PSYTX W PT 60 MINUTES: CPT | Mod: ,,, | Performed by: SOCIAL WORKER
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
## 2024-02-15 ENCOUNTER — PATIENT MESSAGE (OUTPATIENT)
Dept: PRIMARY CARE CLINIC | Facility: CLINIC | Age: 49
End: 2024-02-15
Payer: MEDICARE

## 2024-02-19 ENCOUNTER — LAB VISIT (OUTPATIENT)
Dept: LAB | Facility: HOSPITAL | Age: 49
End: 2024-02-19
Attending: FAMILY MEDICINE
Payer: MEDICARE

## 2024-02-19 DIAGNOSIS — G47.9 SLEEP DIFFICULTIES: ICD-10-CM

## 2024-02-19 DIAGNOSIS — G89.29 CHRONIC ABDOMINAL PAIN: ICD-10-CM

## 2024-02-19 DIAGNOSIS — R10.9 CHRONIC ABDOMINAL PAIN: ICD-10-CM

## 2024-02-19 DIAGNOSIS — R79.9 ABNORMAL FINDING OF BLOOD CHEMISTRY, UNSPECIFIED: ICD-10-CM

## 2024-02-19 DIAGNOSIS — I10 ESSENTIAL (PRIMARY) HYPERTENSION: ICD-10-CM

## 2024-02-19 DIAGNOSIS — F32.A ANXIETY AND DEPRESSION: ICD-10-CM

## 2024-02-19 DIAGNOSIS — F41.9 ANXIETY AND DEPRESSION: ICD-10-CM

## 2024-02-19 LAB
ALBUMIN SERPL BCP-MCNC: 3.7 G/DL (ref 3.5–5.2)
ALP SERPL-CCNC: 117 U/L (ref 55–135)
ALT SERPL W/O P-5'-P-CCNC: 15 U/L (ref 10–44)
ANION GAP SERPL CALC-SCNC: 6 MMOL/L (ref 8–16)
AST SERPL-CCNC: 19 U/L (ref 10–40)
BASOPHILS # BLD AUTO: 0.02 K/UL (ref 0–0.2)
BASOPHILS NFR BLD: 0.5 % (ref 0–1.9)
BILIRUB SERPL-MCNC: 0.4 MG/DL (ref 0.1–1)
BUN SERPL-MCNC: 10 MG/DL (ref 6–20)
CALCIUM SERPL-MCNC: 9.6 MG/DL (ref 8.7–10.5)
CHLORIDE SERPL-SCNC: 103 MMOL/L (ref 95–110)
CHOLEST SERPL-MCNC: 140 MG/DL (ref 120–199)
CHOLEST/HDLC SERPL: 2.3 {RATIO} (ref 2–5)
CO2 SERPL-SCNC: 27 MMOL/L (ref 23–29)
CREAT SERPL-MCNC: 0.7 MG/DL (ref 0.5–1.4)
DIFFERENTIAL METHOD BLD: ABNORMAL
EOSINOPHIL # BLD AUTO: 0 K/UL (ref 0–0.5)
EOSINOPHIL NFR BLD: 0.5 % (ref 0–8)
ERYTHROCYTE [DISTWIDTH] IN BLOOD BY AUTOMATED COUNT: 13.2 % (ref 11.5–14.5)
EST. GFR  (NO RACE VARIABLE): >60 ML/MIN/1.73 M^2
ESTIMATED AVG GLUCOSE: 126 MG/DL (ref 68–131)
GLUCOSE SERPL-MCNC: 92 MG/DL (ref 70–110)
HBA1C MFR BLD: 6 % (ref 4–5.6)
HCT VFR BLD AUTO: 42.8 % (ref 37–48.5)
HDLC SERPL-MCNC: 60 MG/DL (ref 40–75)
HDLC SERPL: 42.9 % (ref 20–50)
HGB BLD-MCNC: 12.9 G/DL (ref 12–16)
IMM GRANULOCYTES # BLD AUTO: 0.01 K/UL (ref 0–0.04)
IMM GRANULOCYTES NFR BLD AUTO: 0.2 % (ref 0–0.5)
LDLC SERPL CALC-MCNC: 67.4 MG/DL (ref 63–159)
LYMPHOCYTES # BLD AUTO: 1.9 K/UL (ref 1–4.8)
LYMPHOCYTES NFR BLD: 46.4 % (ref 18–48)
MCH RBC QN AUTO: 25.3 PG (ref 27–31)
MCHC RBC AUTO-ENTMCNC: 30.1 G/DL (ref 32–36)
MCV RBC AUTO: 84 FL (ref 82–98)
MONOCYTES # BLD AUTO: 0.4 K/UL (ref 0.3–1)
MONOCYTES NFR BLD: 10.7 % (ref 4–15)
NEUTROPHILS # BLD AUTO: 1.7 K/UL (ref 1.8–7.7)
NEUTROPHILS NFR BLD: 41.7 % (ref 38–73)
NONHDLC SERPL-MCNC: 80 MG/DL
NRBC BLD-RTO: 0 /100 WBC
PLATELET # BLD AUTO: 211 K/UL (ref 150–450)
PMV BLD AUTO: 12.6 FL (ref 9.2–12.9)
POTASSIUM SERPL-SCNC: 4.4 MMOL/L (ref 3.5–5.1)
PROT SERPL-MCNC: 7.1 G/DL (ref 6–8.4)
RBC # BLD AUTO: 5.1 M/UL (ref 4–5.4)
SODIUM SERPL-SCNC: 136 MMOL/L (ref 136–145)
TRIGL SERPL-MCNC: 63 MG/DL (ref 30–150)
TSH SERPL DL<=0.005 MIU/L-ACNC: 1.78 UIU/ML (ref 0.4–4)
WBC # BLD AUTO: 4.12 K/UL (ref 3.9–12.7)

## 2024-02-19 PROCEDURE — 85025 COMPLETE CBC W/AUTO DIFF WBC: CPT | Performed by: FAMILY MEDICINE

## 2024-02-19 PROCEDURE — 83036 HEMOGLOBIN GLYCOSYLATED A1C: CPT | Performed by: FAMILY MEDICINE

## 2024-02-19 PROCEDURE — 80061 LIPID PANEL: CPT | Performed by: FAMILY MEDICINE

## 2024-02-19 PROCEDURE — 84443 ASSAY THYROID STIM HORMONE: CPT | Performed by: FAMILY MEDICINE

## 2024-02-19 PROCEDURE — 80053 COMPREHEN METABOLIC PANEL: CPT | Performed by: FAMILY MEDICINE

## 2024-02-19 PROCEDURE — 36415 COLL VENOUS BLD VENIPUNCTURE: CPT | Mod: PN | Performed by: FAMILY MEDICINE

## 2024-02-21 NOTE — PROGRESS NOTES
Labs all look good.      Blood counts look fine.  Electrolytes, kidney function, liver function, and thyroid function were all normal.  Cholesterol levels are perfect.  A1c is holding steady at 6%. Vaccine status unknown

## 2024-02-23 ENCOUNTER — TELEPHONE (OUTPATIENT)
Dept: PAIN MEDICINE | Facility: CLINIC | Age: 49
End: 2024-02-23
Payer: MEDICARE

## 2024-02-23 DIAGNOSIS — G89.29 CHRONIC ABDOMINAL PAIN: Primary | ICD-10-CM

## 2024-02-23 DIAGNOSIS — G89.18 PAIN FOLLOWING SURGERY OR PROCEDURE: ICD-10-CM

## 2024-02-23 DIAGNOSIS — R10.9 CHRONIC ABDOMINAL PAIN: Primary | ICD-10-CM

## 2024-02-23 NOTE — TELEPHONE ENCOUNTER
Patient reports return of abdominal pain that is previously relieved with left transversus abdominis plane block on 10/09/2023.  We will schedule patient for repeat  procedure

## 2024-02-26 ENCOUNTER — TELEPHONE (OUTPATIENT)
Dept: PAIN MEDICINE | Facility: CLINIC | Age: 49
End: 2024-02-26
Payer: MEDICARE

## 2024-02-26 ENCOUNTER — PATIENT MESSAGE (OUTPATIENT)
Dept: PAIN MEDICINE | Facility: CLINIC | Age: 49
End: 2024-02-26
Payer: MEDICARE

## 2024-02-26 NOTE — TELEPHONE ENCOUNTER
----- Message from Jose De Luna MD sent at 2/23/2024  8:54 AM CST -----  Pain Provider: Annamarie  Patient Name: Viri Carlson  MRN: [id number]  Case:  Transverse abdominis plane block  Laterality: left      Patient can follow up 4 weeks after procedure

## 2024-02-27 NOTE — PRE-PROCEDURE INSTRUCTIONS
Spoke with patient regarding procedure scheduled on 3.14     Arrival time 1015     Has patient been sick with fever or on antibiotics within the last 7 days? No     Does the patient have any open wounds, sores or rashes? No     Does the patient have any recent fractures? no     Has patient received a vaccination within the last 7 days? No     Received the COVID vaccination? yes     Has the patient stopped all medications as directed? na     Does patient have a pacemaker, defibrillator, or implantable stimulator? No     Does the patient have a ride to and from procedure and someone reliable to remain with patient?       Is the patient diabetic? no     Does the patient have sleep apnea? Or use O2 at home? no     Is the patient receiving sedation? yes     Is the patient instructed to remain NPO beginning at midnight the night before their procedure? yes     Procedure location confirmed with patient? Yes     Covid- Denies signs/symptoms. Instructed to notify PAT/MD if any changes.

## 2024-03-05 ENCOUNTER — PATIENT MESSAGE (OUTPATIENT)
Dept: PRIMARY CARE CLINIC | Facility: CLINIC | Age: 49
End: 2024-03-05
Payer: MEDICARE

## 2024-03-14 ENCOUNTER — HOSPITAL ENCOUNTER (OUTPATIENT)
Facility: HOSPITAL | Age: 49
Discharge: HOME | End: 2024-03-14
Attending: ANESTHESIOLOGY | Admitting: ANESTHESIOLOGY
Payer: MEDICARE

## 2024-03-14 VITALS
WEIGHT: 209.44 LBS | RESPIRATION RATE: 14 BRPM | DIASTOLIC BLOOD PRESSURE: 56 MMHG | HEIGHT: 72 IN | HEART RATE: 53 BPM | OXYGEN SATURATION: 100 % | BODY MASS INDEX: 28.37 KG/M2 | SYSTOLIC BLOOD PRESSURE: 115 MMHG | TEMPERATURE: 97 F

## 2024-03-14 DIAGNOSIS — G89.29 CHRONIC ABDOMINAL PAIN: Primary | ICD-10-CM

## 2024-03-14 DIAGNOSIS — R10.9 CHRONIC ABDOMINAL PAIN: Primary | ICD-10-CM

## 2024-03-14 PROCEDURE — 63600175 PHARM REV CODE 636 W HCPCS: Performed by: ANESTHESIOLOGY

## 2024-03-14 PROCEDURE — 64486 TAP BLOCK UNIL BY INJECTION: CPT | Performed by: ANESTHESIOLOGY

## 2024-03-14 PROCEDURE — 64488 TAP BLOCK BI INJECTION: CPT | Mod: ,,, | Performed by: ANESTHESIOLOGY

## 2024-03-14 PROCEDURE — 64488 TAP BLOCK BI INJECTION: CPT | Mod: LT | Performed by: ANESTHESIOLOGY

## 2024-03-14 RX ORDER — DEXAMETHASONE SODIUM PHOSPHATE 10 MG/ML
INJECTION INTRAMUSCULAR; INTRAVENOUS
Status: DISCONTINUED | OUTPATIENT
Start: 2024-03-14 | End: 2024-03-14 | Stop reason: HOSPADM

## 2024-03-14 RX ORDER — FENTANYL CITRATE 50 UG/ML
INJECTION, SOLUTION INTRAMUSCULAR; INTRAVENOUS
Status: DISCONTINUED | OUTPATIENT
Start: 2024-03-14 | End: 2024-03-14 | Stop reason: HOSPADM

## 2024-03-14 RX ORDER — BUPIVACAINE HYDROCHLORIDE 2.5 MG/ML
INJECTION, SOLUTION EPIDURAL; INFILTRATION; INTRACAUDAL
Status: DISCONTINUED | OUTPATIENT
Start: 2024-03-14 | End: 2024-03-14 | Stop reason: HOSPADM

## 2024-03-14 RX ORDER — ONDANSETRON HYDROCHLORIDE 2 MG/ML
4 INJECTION, SOLUTION INTRAVENOUS ONCE AS NEEDED
Status: DISCONTINUED | OUTPATIENT
Start: 2024-03-14 | End: 2024-03-14 | Stop reason: HOSPADM

## 2024-03-14 RX ORDER — MIDAZOLAM HYDROCHLORIDE 1 MG/ML
INJECTION, SOLUTION INTRAMUSCULAR; INTRAVENOUS
Status: DISCONTINUED | OUTPATIENT
Start: 2024-03-14 | End: 2024-03-14 | Stop reason: HOSPADM

## 2024-03-14 NOTE — OP NOTE
Diagnostic/Therapeutic Left Transversus Abdominis Plane Block with Ultrasound Guidance    The procedure, risks, benefits, and options were discussed with the patient. There are no contraindications to the procedure. The patent expressed understanding and agreed to the procedure. Informed written consent was obtained prior to the start of the procedure and can be found in the patient's chart.    PATIENT NAME: Viri Carlson   MRN: [id number]     DATE OF PROCEDURE: 03/14/2024    PROCEDURE: Diagnostic/Therapeutic Left Transversus Abdominis Plane Block with Ultrasound Guidance    PRE-OP DIAGNOSIS: Chronic abdominal pain [R10.9, G89.29]  Pain following surgery or procedure [G89.18]    POST-OP DIAGNOSIS: Same    PHYSICIAN: Jose De Luna MD        MEDICATIONS INJECTED: 1ml Preservative-free Decadron 10mg with 19cc of Bupivacaine 0.25%     LOCAL ANESTHETIC INJECTED: Xylocaine 2%     Sedation: Conscious sedation provided by MLAURENT    SEDATION MEDICATIONS: local/IV sedation: Versed 2 mg and fentanyl 50 mcg IV.  Conscious sedation ordered by MD.  Patient reevaluated and sedation administered by MD and monitored by RN.  Total sedation time was less than 10 min.      ESTIMATED BLOOD LOSS: None    COMPLICATIONS: None    TECHNIQUE: Time-out was performed to identify the patient and procedure to be performed. With the patient laying in a lateral decubitus position on the stretcher, the surgical area was prepped and draped in the usual sterile fashion using ChloraPrep and a fenestrated drape. Ultrasound guidance was used to locate bowel, external oblique, internal oblique, and transversus abdominis muscles.  Skin anesthesia was achieved by injecting Lidocaine 2% over the injection site. A 22 gauge, 5 inch spinal quinke needle was used to engage the abdominal wall muscles. After negative pressure was applied to confirm no intravascular placement, 20ml of the medication mixture listed above was injected slowly into the fascial plane  between the transversus abdominis and internal oblique muscles. The needle was removed and bleeding was nil. A sterile dressing was applied. No specimens collected. The patient tolerated the procedure well.     The patient was monitored after the procedure in the recovery area. They were given post-procedure and discharge instructions to follow at home. The patient was discharged in a stable condition.

## 2024-03-14 NOTE — DISCHARGE INSTRUCTIONS

## 2024-03-14 NOTE — DISCHARGE SUMMARY
Discharge Note  Short Stay      SUMMARY     Admit Date: 3/14/2024    Attending Physician: Jose De Luna MD        Discharge Physician: Jose De Luna MD        Discharge Date: 3/14/2024 11:25 AM    Procedure(s) (LRB):  Ultrasound-Guided Left TRANSVERSUS ABDOMINIS PLANE Block (Left)    Final Diagnosis: Chronic abdominal pain [R10.9, G89.29]  Pain following surgery or procedure [G89.18]    Disposition: Home or self care    Patient Instructions:   Current Discharge Medication List        CONTINUE these medications which have NOT CHANGED    Details   bumetanide (BUMEX) 1 MG tablet Take 1 tablet (1 mg total) by mouth once daily.  Qty: 90 tablet, Refills: 3    Comments: .  Associated Diagnoses: Venous insufficiency      calcium carbonate (OS-ESE) 600 mg calcium (1,500 mg) Tab Take 600 mg by mouth.      cyanocobalamin 1,000 mcg/mL injection Inject 1 mL (1,000 mcg total) into the muscle every 30 days.  Qty: 3 mL, Refills: 3    Associated Diagnoses: B12 deficiency      ergocalciferol, vitamin D2, (VITAMIN D ORAL)       ferrous sulfate (FEOSOL) 325 mg (65 mg iron) Tab tablet Take 325 mg by mouth.      gabapentin (NEURONTIN) 600 MG tablet Take 1 tablet (600 mg total) by mouth 3 (three) times daily.  Qty: 270 tablet, Refills: 3    Associated Diagnoses: Chronic abdominal pain      HYDROcodone-acetaminophen (NORCO) 5-325 mg per tablet Take 1 tablet by mouth every 8 (eight) hours as needed for Pain.  Qty: 21 tablet, Refills: 0    Comments: Quantity prescribed more than 7 day supply? Yes, quantity medically necessary  Associated Diagnoses: Chronic abdominal pain      iron,carb/vit C/vit B12/folic (IRON 100 PLUS ORAL)       pantoprazole (PROTONIX) 40 MG tablet Take 1 tablet (40 mg total) by mouth once daily.  Qty: 30 tablet, Refills: 11      potassium chloride (KLOR-CON) 20 mEq Pack Take 40 mEq by mouth once daily.  Qty: 90 packet, Refills: 3    Associated Diagnoses: Venous insufficiency      promethazine (PHENERGAN) 12.5 MG  Supp Place 1 suppository (12.5 mg total) rectally every 6 (six) hours as needed.  Qty: 10 suppository, Refills: 0    Associated Diagnoses: Nausea and vomiting, unspecified vomiting type      promethazine (PHENERGAN) 25 MG tablet Take 1 tablet (25 mg total) by mouth every 6 (six) hours as needed for Nausea.  Qty: 20 tablet, Refills: 0    Associated Diagnoses: Nausea and vomiting, unspecified vomiting type      spironolactone (ALDACTONE) 25 MG tablet Take 1 tablet (25 mg total) by mouth once daily.  Qty: 90 tablet, Refills: 3    Comments: .  Associated Diagnoses: Essential (primary) hypertension      tiZANidine (ZANAFLEX) 4 MG tablet Take 2 tablets (8 mg total) by mouth 3 (three) times daily.  Qty: 540 tablet, Refills: 3    Associated Diagnoses: Chronic abdominal pain      traZODone (DESYREL) 150 MG tablet Take 1 tablet (150 mg total) by mouth every evening.  Qty: 90 tablet, Refills: 3    Associated Diagnoses: Anxiety and depression; Sleep difficulties      venlafaxine (EFFEXOR-XR) 75 MG 24 hr capsule Take 1 capsule (75 mg total) by mouth once daily.  Qty: 90 capsule, Refills: 3    Associated Diagnoses: Anxiety and depression                 Discharge Diagnosis: Chronic abdominal pain [R10.9, G89.29]  Pain following surgery or procedure [G89.18]  Condition on Discharge: Stable with no complications to procedure   Diet on Discharge: Same as before.  Activity: as per instruction sheet.  Discharge to: Home with a responsible adult.  Follow up: 2-4 weeks       Please call the office at [phone] if you experience any weakness or loss of sensation, fever > 101.5, pain uncontrolled with oral medications, persistent nausea/vomiting/or diarrhea, redness or drainage from the incisions, or any other worrisome concerns. If physician on call was not reached or could not communicate with our office for any reason please go to the nearest emergency department

## 2024-03-14 NOTE — H&P
HPI  Patient presenting for Procedure(s) (LRB):  Ultrasound-Guided Left TRANSVERSUS ABDOMINIS PLANE Block (Left)     Patient on Anti-coagulation No    No health changes since previous encounter    Past Medical History:   Diagnosis Date    Anemia 02/01/1990    Anxiety 08/01/2010    Depression 2008    Encounter for blood transfusion 01/15/2008    Endometriosis of uterus 05/01/1999    Fibroid 2010    Gestational diabetes     Heart disease     Heart murmur 01/14/2008    Hypertension 2009    Marfan syndrome     Varicose veins - Both legs      Past Surgical History:   Procedure Laterality Date    ABDOMINAL SURGERY  06/04/2006    several-had abscesses and complications after wt loss surgery; repair of small bowl injury; subsequent colon resection 6/14/19    APPENDECTOMY  2009    Rupture    BREAST BIOPSY Left 2019    CERVIX LESION DESTRUCTION      cryo for cervical dysplasia    CHOLECYSTECTOMY      COLON SURGERY  2006    COLONOSCOPY  2018    Normal    COLONOSCOPY N/A 12/13/2022    Procedure: COLONOSCOPY 10/29-card clearance received;  Surgeon: Ruben Chavez MD;  Location: [facility];  Service: Endoscopy;  Laterality: N/A;    COLONOSCOPY N/A 07/11/2023    Procedure: COLONOSCOPY;  Surgeon: Angy Ellis MD;  Location: [facility];  Service: Endoscopy;  Laterality: N/A;    CORONARY ARTERY BYPASS GRAFT (CABG)  2008    DILATION AND CURETTAGE OF UTERUS  2008    Miscarriage    ESOPHAGOGASTRODUODENOSCOPY N/A 07/11/2023    Procedure: EGD (ESOPHAGOGASTRODUODENOSCOPY);  Surgeon: Angy Ellis MD;  Location: [facility];  Service: Endoscopy;  Laterality: N/A;    INJECTION OF ANESTHETIC AGENT INTO TISSUE PLANE DEFINED BY TRANSVERSUS ABDOMINIS MUSCLE Left 10/9/2023    Procedure: Left TRANSVERSUS ABDOMINIS PLANE Block under Ultrasound;  Surgeon: Jose De Luna MD;  Location: [facility];  Service: Pain Management;  Laterality: Left;    INSERTION OF VENOUS ACCESS PORT Left     Power Port 2012- Left Chestwall placed in  [place]    LUNG REMOVAL, PARTIAL Right     2/3 of right lung removed; thought to be lung cancer, final path equals histoplasmosis    CHANDA-EN-Y PROCEDURE       Review of patient's allergies indicates:   Allergen Reactions    Aspirin Anaphylaxis and Swelling     Tongue swelling      Cephalexin Anaphylaxis, Nausea And Vomiting, Hives and Other (See Comments)    Iodine and iodide containing products Other (See Comments)     Pt is not allergic to contrast dye IV     Levofloxacin Anaphylaxis    Sulfa (sulfonamide antibiotics) Anaphylaxis, Blisters, Dermatitis, Itching, Shortness Of Breath and Other (See Comments)    Sulfamethoxazole-trimethoprim Anaphylaxis     3rd degree skin burning when given IV      Sulfasalazine Anaphylaxis    Tramadol Nausea And Vomiting    Adhesive Dermatitis, Hives, Itching and Other (See Comments)     bandaids and adhesive on electrodes  bandaids and adhesive on electrodes, whelps      Adhesive tape-silicones Itching    Barium iodide Nausea And Vomiting     Oral only causes vomiting        No current facility-administered medications on file prior to encounter.     Current Outpatient Medications on File Prior to Encounter   Medication Sig Dispense Refill    bumetanide (BUMEX) 1 MG tablet Take 1 tablet (1 mg total) by mouth once daily. 90 tablet 3    calcium carbonate (OS-ESE) 600 mg calcium (1,500 mg) Tab Take 600 mg by mouth.      cyanocobalamin 1,000 mcg/mL injection Inject 1 mL (1,000 mcg total) into the muscle every 30 days. 3 mL 3    ergocalciferol, vitamin D2, (VITAMIN D ORAL)       ferrous sulfate (FEOSOL) 325 mg (65 mg iron) Tab tablet Take 325 mg by mouth.      gabapentin (NEURONTIN) 600 MG tablet Take 1 tablet (600 mg total) by mouth 3 (three) times daily. 270 tablet 3    HYDROcodone-acetaminophen (NORCO) 5-325 mg per tablet Take 1 tablet by mouth every 8 (eight) hours as needed for Pain. 21 tablet 0    iron,carb/vit C/vit B12/folic (IRON 100 PLUS ORAL)       pantoprazole  "(PROTONIX) 40 MG tablet Take 1 tablet (40 mg total) by mouth once daily. 30 tablet 11    potassium chloride (KLOR-CON) 20 mEq Pack Take 40 mEq by mouth once daily. 90 packet 3    promethazine (PHENERGAN) 12.5 MG Supp Place 1 suppository (12.5 mg total) rectally every 6 (six) hours as needed. 10 suppository 0    promethazine (PHENERGAN) 25 MG tablet Take 1 tablet (25 mg total) by mouth every 6 (six) hours as needed for Nausea. 20 tablet 0    spironolactone (ALDACTONE) 25 MG tablet Take 1 tablet (25 mg total) by mouth once daily. 90 tablet 3    tiZANidine (ZANAFLEX) 4 MG tablet Take 2 tablets (8 mg total) by mouth 3 (three) times daily. 540 tablet 3    traZODone (DESYREL) 150 MG tablet Take 1 tablet (150 mg total) by mouth every evening. 90 tablet 3    venlafaxine (EFFEXOR-XR) 75 MG 24 hr capsule Take 1 capsule (75 mg total) by mouth once daily. 90 capsule 3        PMHx, PSHx, Allergies, Medications reviewed in epic    ROS negative except pain complaints in HPI    OBJECTIVE:    BP (!) 120/58 (BP Location: Right arm, Patient Position: Sitting)   Pulse (!) 50   Temp 96.6 °F (35.9 °C)   Resp 15   Ht 6' 4" (1.93 m)   Wt 95 kg (209 lb 7 oz)   SpO2 98%   Breastfeeding No   BMI 25.49 kg/m²     PHYSICAL EXAMINATION:    GENERAL: Well appearing, in no acute distress, alert and oriented x3.  PSYCH:  Mood and affect appropriate.  SKIN: Skin color, texture, turgor normal, no rashes or lesions which will impact the procedure.  CV: RRR with palpation of the radial artery.  PULM: No evidence of respiratory difficulty, symmetric chest rise. Clear to auscultation.  NEURO: Cranial nerves grossly intact.    Plan:    Proceed with procedure as planned Procedure(s) (LRB):  Ultrasound-Guided Left TRANSVERSUS ABDOMINIS PLANE Block (Left)    Jose De Luna MD  03/14/2024            "

## 2024-03-27 ENCOUNTER — TELEPHONE (OUTPATIENT)
Dept: CARDIOLOGY | Facility: CLINIC | Age: 49
End: 2024-03-27
Payer: MEDICARE

## 2024-03-27 DIAGNOSIS — Z76.89 ENCOUNTER TO ESTABLISH CARE: Primary | ICD-10-CM

## 2024-03-28 ENCOUNTER — HOSPITAL ENCOUNTER (OUTPATIENT)
Dept: CARDIOLOGY | Facility: HOSPITAL | Age: 49
Discharge: HOME | End: 2024-03-28
Attending: INTERNAL MEDICINE
Payer: MEDICARE

## 2024-03-28 ENCOUNTER — OFFICE VISIT (OUTPATIENT)
Dept: CARDIOLOGY | Facility: CLINIC | Age: 49
End: 2024-03-28
Payer: MEDICARE

## 2024-03-28 VITALS
SYSTOLIC BLOOD PRESSURE: 120 MMHG | WEIGHT: 215.38 LBS | HEART RATE: 59 BPM | BODY MASS INDEX: 26.22 KG/M2 | OXYGEN SATURATION: 99 % | DIASTOLIC BLOOD PRESSURE: 72 MMHG

## 2024-03-28 DIAGNOSIS — Z76.89 ENCOUNTER TO ESTABLISH CARE: ICD-10-CM

## 2024-03-28 DIAGNOSIS — I36.1 NONRHEUMATIC TRICUSPID VALVE REGURGITATION: ICD-10-CM

## 2024-03-28 DIAGNOSIS — I25.118 CORONARY ARTERY DISEASE OF NATIVE ARTERY OF NATIVE HEART WITH STABLE ANGINA PECTORIS: Primary | ICD-10-CM

## 2024-03-28 DIAGNOSIS — Q87.40 MARFAN'S SYNDROME: ICD-10-CM

## 2024-03-28 DIAGNOSIS — I27.20 PULMONARY HYPERTENSION: ICD-10-CM

## 2024-03-28 DIAGNOSIS — Z95.1 HX OF CABG: ICD-10-CM

## 2024-03-28 DIAGNOSIS — I34.0 NONRHEUMATIC MITRAL VALVE REGURGITATION: ICD-10-CM

## 2024-03-28 DIAGNOSIS — I87.2 VENOUS INSUFFICIENCY: ICD-10-CM

## 2024-03-28 DIAGNOSIS — R73.03 PRE-DIABETES: ICD-10-CM

## 2024-03-28 DIAGNOSIS — I10 ESSENTIAL (PRIMARY) HYPERTENSION: ICD-10-CM

## 2024-03-28 DIAGNOSIS — R94.31 ABNORMAL ECG: ICD-10-CM

## 2024-03-28 PROCEDURE — 99213 OFFICE O/P EST LOW 20 MIN: CPT | Mod: PBBFAC | Performed by: INTERNAL MEDICINE

## 2024-03-28 PROCEDURE — 99999 PR PBB SHADOW E&M-EST. PATIENT-LVL III: CPT | Mod: PBBFAC,,, | Performed by: INTERNAL MEDICINE

## 2024-03-28 PROCEDURE — 93005 ELECTROCARDIOGRAM TRACING: CPT

## 2024-03-28 PROCEDURE — 99215 OFFICE O/P EST HI 40 MIN: CPT | Mod: S$PBB,,, | Performed by: INTERNAL MEDICINE

## 2024-03-28 PROCEDURE — 93010 ELECTROCARDIOGRAM REPORT: CPT | Mod: ,,, | Performed by: INTERNAL MEDICINE

## 2024-03-28 NOTE — PROGRESS NOTES
Subjective   Patient ID:  Viri Carlson is a 49 y.o. female who presents for evaluation of Coronary Artery Disease          HPI  Pt presents for eval.  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  asa allergy- hives.   F/u with Dr. Aleksandr Oviedo, Cardiology in 2022.  Past details as below per Dr. Oviedo's note:  The patient's initial cardiac event was in 2008 when she presented w an AMI 2/2 SCAD. This resulted in a CABGx1. Pt was curiously asymptomatic at that time of event. Pt unclear on the details, but reports that she was getting a physical exam and due to a test abnormality was rushed to the emergency room, where she ended up undergoing cardiac catheterization and emergent bypass? This is where her diagnosis of Marfan's comes from. Negative genetic testing in [place].  Since then, no repeat operations. She's underwent multiple cardiac catheterizations and CT angiograms that have been unremarkable for a chronic intermittent chest pain syndrome that sometimes results in ED visits.   Echo March 2022: normal EF, LAE, mild MR, mild-mod TR, PAP 36 mmHg, normal aorta.  Labs reviewed.  Ecg today 3/28/24 personally reviewed: sinus florencia 58 bpm nonspecific T wave abnl.  No angina.  Dyspnea w stress.  On diuretics for leg edema.  Exercises 3 days/week on ex bike x 30 minutes, walks daily.  LDL < 70 on no med tx.  HGAIC low.   She states her last heart cath was 3 years ago in [place] and all was ok.        Past Medical History:   Diagnosis Date    Anemia 02/01/1990    Anxiety 08/01/2010    Depression 2008    Encounter for blood transfusion 01/15/2008    Endometriosis of uterus 05/01/1999    Fibroid 2010    Gestational diabetes     Heart disease     Heart murmur 01/14/2008    Hypertension 2009    Marfan syndrome     Varicose veins - Both legs         Current Outpatient Medications:     bumetanide (BUMEX) 1 MG tablet, Take 1 tablet (1 mg total) by mouth once daily., Disp: 90 tablet, Rfl: 3    calcium carbonate (OS-ESE) 600 mg calcium (1,500 mg) Tab, Take 600 mg by mouth., Disp: , Rfl:     cyanocobalamin 1,000 mcg/mL injection, Inject 1 mL (1,000 mcg total) into the muscle every 30 days., Disp: 3 mL, Rfl: 3    ergocalciferol, vitamin D2, (VITAMIN D ORAL), , Disp: , Rfl:     ferrous sulfate (FEOSOL) 325 mg (65 mg iron) Tab tablet, Take 325 mg by mouth., Disp: , Rfl:     gabapentin (NEURONTIN) 600 MG tablet, Take 1 tablet (600 mg total) by mouth 3 (three) times daily., Disp: 270 tablet, Rfl: 3    HYDROcodone-acetaminophen (NORCO) 5-325 mg per tablet, Take 1 tablet by mouth every 8 (eight) hours as needed for Pain., Disp: 21 tablet, Rfl: 0    iron,carb/vit C/vit B12/folic (IRON 100 PLUS ORAL), , Disp: , Rfl:     pantoprazole (PROTONIX) 40 MG tablet, Take 1 tablet (40 mg total) by mouth once daily., Disp: 30 tablet, Rfl: 11    potassium chloride (KLOR-CON) 20 mEq Pack, Take 40 mEq by mouth once daily., Disp: 90 packet, Rfl: 3    promethazine (PHENERGAN) 12.5 MG Supp, Place 1 suppository (12.5 mg total) rectally every 6 (six) hours as needed., Disp: 10 suppository, Rfl: 0    promethazine (PHENERGAN) 25 MG tablet, Take 1 tablet (25 mg total) by mouth every 6 (six) hours as needed for Nausea., Disp: 20 tablet, Rfl: 0    spironolactone (ALDACTONE) 25 MG tablet, Take 1 tablet (25 mg total) by mouth once daily., Disp: 90 tablet, Rfl: 3    tiZANidine (ZANAFLEX) 4 MG tablet, Take 2 tablets (8 mg total) by mouth 3 (three) times daily., Disp: 540 tablet, Rfl: 3    traZODone (DESYREL) 150 MG tablet, Take 1 tablet (150 mg total) by mouth every evening., Disp: 90 tablet, Rfl: 3    venlafaxine (EFFEXOR-XR) 75 MG 24 hr capsule, Take 1 capsule (75 mg total) by mouth once daily., Disp: 90 capsule, Rfl: 3      Review of Systems   Constitutional: Negative.   HENT: Negative.      Eyes: Negative.    Cardiovascular:  Positive for leg swelling.   Respiratory:  Positive for shortness of breath.    Endocrine: Negative.    Hematologic/Lymphatic: Negative.    Skin: Negative.    Musculoskeletal:  Positive for muscle cramps.   Gastrointestinal: Negative.    Genitourinary: Negative.    Neurological: Negative.    Psychiatric/Behavioral: Negative.     Allergic/Immunologic: Negative.           Objective     /72 (BP Location: Left arm, Patient Position: Sitting, BP Method: Small (Automatic))   Pulse (!) 59   Wt 97.7 kg (215 lb 6.2 oz)   SpO2 99%   BMI 26.22 kg/m²     Wt Readings from Last 3 Encounters:   03/28/24 97.7 kg (215 lb 6.2 oz)   03/14/24 95 kg (209 lb 7 oz)   12/05/23 96.1 kg (211 lb 13.8 oz)     Temp Readings from Last 3 Encounters:   03/14/24 96.6 °F (35.9 °C)   10/18/23 98.1 °F (36.7 °C)   10/09/23 97.1 °F (36.2 °C) (Temporal)     BP Readings from Last 3 Encounters:   03/28/24 120/72   03/14/24 (!) 115/56   12/05/23 130/84     Pulse Readings from Last 3 Encounters:   03/28/24 (!) 59   03/14/24 (!) 53   12/05/23 71         Physical Exam  Vitals and nursing note reviewed.   Constitutional:       General: She is not in acute distress.     Appearance: Normal appearance. She is well-developed. She is not ill-appearing or diaphoretic.   HENT:      Head: Normocephalic.   Neck:      Thyroid: No thyromegaly.      Vascular: Normal carotid pulses. No carotid bruit, hepatojugular reflux or JVD.   Cardiovascular:      Rate and Rhythm: Normal rate and regular rhythm.      Chest Wall: PMI is not displaced.      Pulses: Normal pulses.           Radial pulses are 2+ on the right side and 2+ on the left side.      Heart sounds: Normal heart sounds, S1 normal and S2 normal. No murmur heard.     No friction rub. No gallop.   Pulmonary:      Effort: Pulmonary effort is normal.      Breath sounds: Normal breath sounds. No wheezing or rales.   Abdominal:      General: Bowel sounds are normal. There is  no abdominal bruit.      Palpations: Abdomen is soft. There is no hepatomegaly, splenomegaly or mass.      Tenderness: There is no abdominal tenderness.   Musculoskeletal:      Cervical back: Neck supple.      Right lower leg: Edema present.      Left lower leg: Edema present.   Lymphadenopathy:      Cervical: No cervical adenopathy.   Skin:     General: Skin is warm.   Neurological:      Mental Status: She is alert and oriented to person, place, and time.   Psychiatric:         Behavior: Behavior normal. Behavior is cooperative.     I have reviewed all pertinent labs and cardiac studies.        Chemistry        Component Value Date/Time     02/19/2024 0732    K 4.4 02/19/2024 0732     02/19/2024 0732    CO2 27 02/19/2024 0732    BUN 10 02/19/2024 0732    CREATININE 0.7 02/19/2024 0732    GLU 92 02/19/2024 0732        Component Value Date/Time    CALCIUM 9.6 02/19/2024 0732    ALKPHOS 117 02/19/2024 0732    AST 19 02/19/2024 0732    ALT 15 02/19/2024 0732    BILITOT 0.4 02/19/2024 0732    ESTGFRAFRICA >60.0 03/18/2022 0711    EGFRNONAA >60.0 03/18/2022 0711        Lab Results   Component Value Date    WBC 4.12 02/19/2024    HGB 12.9 02/19/2024    HCT 42.8 02/19/2024    MCV 84 02/19/2024     02/19/2024       Lab Results   Component Value Date    HGBA1C 6.0 (H) 02/19/2024       Lab Results   Component Value Date    CHOL 140 02/19/2024    CHOL 141 09/13/2022    CHOL 126 03/18/2022     Lab Results   Component Value Date    HDL 60 02/19/2024    HDL 47 09/13/2022    HDL 50 03/18/2022     Lab Results   Component Value Date    LDLCALC 67.4 02/19/2024    LDLCALC 76.6 09/13/2022    LDLCALC 59.4 (L) 03/18/2022     Lab Results   Component Value Date    TRIG 63 02/19/2024    TRIG 87 09/13/2022    TRIG 83 03/18/2022       Lab Results   Component Value Date    CHOLHDL 42.9 02/19/2024    CHOLHDL 33.3 09/13/2022    CHOLHDL 39.7 03/18/2022         Results for orders placed during the hospital encounter of  03/24/22    Echo    Interpretation Summary  · The left ventricle is normal in size with normal systolic function.  · The estimated ejection fraction is 65%.  · Mild left atrial enlargement.  · Normal right ventricular size with normal right ventricular systolic function.  · Mild mitral regurgitation.  · Mild to moderate tricuspid regurgitation.  · Normal central venous pressure (3 mmHg).  · The estimated PA systolic pressure is 36 mmHg.         Assessment and Plan     1. Coronary artery disease of native artery of native heart with stable angina pectoris    2. Pre-diabetes    3. Venous insufficiency    4. Pulmonary hypertension    5. Essential (primary) hypertension     6. Hx of CABG    7. Marfan's syndrome    8. Abnormal ECG    9. Nonrheumatic mitral valve regurgitation    10. Nonrheumatic tricuspid valve regurgitation        Plan:    CV risk factor modification discussed.  CVT surgery (Dr. Colon/Dr. Roth) consultation for Marfan's and aorta evaluation.  Continue current meds.  Echocardiogram.  Cardiac diet.  Daily exercise.  HTN control.  Diuretics.  HGAIC control.    F/u 6 months.        I have reviewed all pertinent labs and cardiac studies independently. Plans and recommendations have been formulated under my direct supervision. All questions answered and patient voiced understanding.

## 2024-04-01 LAB
OHS QRS DURATION: 76 MS
OHS QTC CALCULATION: 420 MS

## 2024-04-10 ENCOUNTER — OFFICE VISIT (OUTPATIENT)
Dept: CARDIOTHORACIC SURGERY | Facility: CLINIC | Age: 49
End: 2024-04-10
Payer: MEDICARE

## 2024-04-10 ENCOUNTER — LAB VISIT (OUTPATIENT)
Dept: LAB | Facility: HOSPITAL | Age: 49
End: 2024-04-10
Attending: THORACIC SURGERY (CARDIOTHORACIC VASCULAR SURGERY)
Payer: MEDICARE

## 2024-04-10 VITALS
BODY MASS INDEX: 29.32 KG/M2 | WEIGHT: 216.5 LBS | SYSTOLIC BLOOD PRESSURE: 122 MMHG | HEIGHT: 72 IN | DIASTOLIC BLOOD PRESSURE: 70 MMHG | HEART RATE: 76 BPM

## 2024-04-10 DIAGNOSIS — R94.31 ABNORMAL ECG: ICD-10-CM

## 2024-04-10 DIAGNOSIS — R73.03 PRE-DIABETES: ICD-10-CM

## 2024-04-10 DIAGNOSIS — I36.1 NONRHEUMATIC TRICUSPID VALVE REGURGITATION: ICD-10-CM

## 2024-04-10 DIAGNOSIS — I10 ESSENTIAL (PRIMARY) HYPERTENSION: ICD-10-CM

## 2024-04-10 DIAGNOSIS — Z95.1 HX OF CABG: Primary | ICD-10-CM

## 2024-04-10 DIAGNOSIS — Q87.40 MARFAN'S SYNDROME: ICD-10-CM

## 2024-04-10 DIAGNOSIS — I25.118 CORONARY ARTERY DISEASE OF NATIVE ARTERY OF NATIVE HEART WITH STABLE ANGINA PECTORIS: ICD-10-CM

## 2024-04-10 DIAGNOSIS — I27.20 PULMONARY HYPERTENSION: ICD-10-CM

## 2024-04-10 DIAGNOSIS — R73.03 PREDIABETES: ICD-10-CM

## 2024-04-10 DIAGNOSIS — R91.1 SOLITARY PULMONARY NODULE: ICD-10-CM

## 2024-04-10 DIAGNOSIS — Z95.1 HX OF CABG: ICD-10-CM

## 2024-04-10 DIAGNOSIS — I87.2 VENOUS INSUFFICIENCY: ICD-10-CM

## 2024-04-10 DIAGNOSIS — I34.0 NONRHEUMATIC MITRAL VALVE REGURGITATION: ICD-10-CM

## 2024-04-10 LAB
BNP SERPL-MCNC: 113 PG/ML (ref 0–99)
MAGNESIUM SERPL-MCNC: 2.1 MG/DL (ref 1.6–2.6)

## 2024-04-10 PROCEDURE — 83735 ASSAY OF MAGNESIUM: CPT | Performed by: THORACIC SURGERY (CARDIOTHORACIC VASCULAR SURGERY)

## 2024-04-10 PROCEDURE — 99999 PR PBB SHADOW E&M-EST. PATIENT-LVL III: CPT | Mod: PBBFAC,,, | Performed by: THORACIC SURGERY (CARDIOTHORACIC VASCULAR SURGERY)

## 2024-04-10 PROCEDURE — 83880 ASSAY OF NATRIURETIC PEPTIDE: CPT | Performed by: THORACIC SURGERY (CARDIOTHORACIC VASCULAR SURGERY)

## 2024-04-10 PROCEDURE — 99205 OFFICE O/P NEW HI 60 MIN: CPT | Mod: S$PBB,,, | Performed by: THORACIC SURGERY (CARDIOTHORACIC VASCULAR SURGERY)

## 2024-04-10 PROCEDURE — 99213 OFFICE O/P EST LOW 20 MIN: CPT | Mod: PBBFAC | Performed by: THORACIC SURGERY (CARDIOTHORACIC VASCULAR SURGERY)

## 2024-04-10 PROCEDURE — 36415 COLL VENOUS BLD VENIPUNCTURE: CPT | Performed by: THORACIC SURGERY (CARDIOTHORACIC VASCULAR SURGERY)

## 2024-04-10 NOTE — PROGRESS NOTES
Subjective:      Patient ID: Viri Carlson is a 49 y.o. female.    Chief Complaint: Marfan's - referral    HPI:  The patient is a 49-year-old female with complicated past surgical and past medical history which include diagnosis of Marfan syndrome complicated by (spontaneous coronary artery dissection) SCAD and status post CABG x1 in 2008.  It is of note that patient was pregnant when she developed SCAD.      The patient denies any family history of Marfan syndrome.  The patient states that she does not have a good paternal family history.  However, patient states the her father was 7 ft tall and her son is 6 ft 9 in tall.  Genetic testing on her son and her brothers for Marfan's have been negative.  The patient states that she thinks that genetic testing was done on her in 2008 which were also negative for Marfan's.  In addition,  patient has had multiple eye exams by ophthalmologist which were negative for ectopia lentis as per patient..    In addition, the patient has mitral regurgitation, tricuspid regurgitation, hypertension, gastric bypass complicated with abdominal abscess and multiple abdominal surgeries, right upper lobe lung resection.    The patient's main complaints there musculoskeletal spasms.  The patient denies any chest pain or pain radiating into the back.  She denies any orthopnea or PND.  However patient has dyspnea on exertion.  In addition, patient has occasional palpitations.  The patient complains of ankle swelling for which she takes Bumex.    Review of patient's allergies indicates:   Allergen Reactions    Aspirin Anaphylaxis and Swelling     Tongue swelling      Cephalexin Anaphylaxis, Nausea And Vomiting, Hives and Other (See Comments)    Iodine and iodide containing products Other (See Comments)     Pt is not allergic to contrast dye IV     Levofloxacin Anaphylaxis    Sulfa (sulfonamide antibiotics) Anaphylaxis, Blisters, Dermatitis, Itching, Shortness Of Breath and Other (See  "Comments)    Sulfamethoxazole-trimethoprim Anaphylaxis     3rd degree skin burning when given IV      Sulfasalazine Anaphylaxis    Tramadol Nausea And Vomiting    Adhesive Dermatitis, Hives, Itching and Other (See Comments)     bandaids and adhesive on electrodes  bandaids and adhesive on electrodes, whelps      Adhesive tape-silicones Itching    Barium iodide Nausea And Vomiting     Oral only causes vomiting            Objective:   /70 (BP Location: Left arm, Patient Position: Sitting)   Pulse 76   Ht 6' 4" (1.93 m)   Wt 98.2 kg (216 lb 7.9 oz)   BMI 26.35 kg/m²     Mammo Digital Diagnostic Left with Loc  Narrative: Result:   Mammo Digital Diagnostic Left with Loc     History:  Patient is 48 y.o. and is seen for diagnostic imaging.    Films Compared:  Compared to: 07/06/2023 Mammo Digital Screening Bilat w/ Loc, 07/18/2022   US Breast Right Limited, 06/29/2022 Mammo Digital Screening Bilat,   12/03/2018 Mammo Previous, and 02/14/2014 Mammo Digital Screening Bilat     Findings:  This procedure was performed using tomosynthesis. Computer-aided detection   was utilized in the interpretation of this examination.  Left  The left breast has scattered areas of fibroglandular density. There is no   evidence of suspicious masses, calcifications, or other abnormal findings   in the left breAsymmetry compresses out on additional views.  [place]   of normal breast parenchyma is suspected.  Impression: There is no mammographic evidence of malignancy in the left breast.    BI-RADS Category:   Overall: 2 - Benign       Recommendation:  Routine screening mammogram in 1 year is recommended.         Physical Exam  Constitutional:       Appearance: Normal appearance. She is obese.   HENT:      Head: Normocephalic and atraumatic.      Nose: Nose normal.   Cardiovascular:      Rate and Rhythm: Normal rate and regular rhythm.      Heart sounds: Normal heart sounds.   Pulmonary:      Effort: Pulmonary effort is normal.    "   Breath sounds: Normal breath sounds.   Abdominal:      General: Abdomen is flat.      Palpations: Abdomen is soft.   Musculoskeletal:      Right lower leg: No edema.      Left lower leg: No edema.      Comments: Examination of patient's hands shows patient has long fingers but a formal testing for arachnodactyly was negative.   Skin:     General: Skin is warm and dry.   Neurological:      Mental Status: She is alert and oriented to person, place, and time.   Psychiatric:         Behavior: Behavior normal.         Assessment:     1. Marfan's syndrome    2. Coronary artery disease of native artery of native heart with stable angina pectoris    3. Pre-diabetes    4. Venous insufficiency    5. Pulmonary hypertension    6. Essential (primary) hypertension     7. Hx of CABG    8. Abnormal ECG    9. Nonrheumatic mitral valve regurgitation    10. Nonrheumatic tricuspid valve regurgitation          Plan   The patient is a 49-year-old female with a diagnosis of Marfan syndrome.  However as per patient genetic testing of family members and herself have been negative, in addition as per patient, testing for ectopia lentis has also been negative.   Patient has an enlarged ascending aorta with a maximum diameter 4.1 cm in 2023.  Repeat CT scan of the chest to assess current diameter the aorta  Repeat echocardiogram to assess patient's mitral and tricuspid valves.  In light of patient's shortness of breath and ankle swelling. Will check BNP.  Return to clinic in 4 weeks.          Mark Awolesi, [facility]

## 2024-04-11 ENCOUNTER — TELEPHONE (OUTPATIENT)
Dept: PAIN MEDICINE | Facility: CLINIC | Age: 49
End: 2024-04-11
Payer: MEDICARE

## 2024-04-12 ENCOUNTER — HOSPITAL ENCOUNTER (OUTPATIENT)
Dept: CARDIOLOGY | Facility: HOSPITAL | Age: 49
Discharge: HOME | End: 2024-04-12
Attending: THORACIC SURGERY (CARDIOTHORACIC VASCULAR SURGERY)
Payer: MEDICARE

## 2024-04-15 ENCOUNTER — TELEPHONE (OUTPATIENT)
Dept: CARDIOTHORACIC SURGERY | Facility: CLINIC | Age: 49
End: 2024-04-15
Payer: MEDICARE

## 2024-04-15 ENCOUNTER — OFFICE VISIT (OUTPATIENT)
Dept: PAIN MEDICINE | Facility: CLINIC | Age: 49
End: 2024-04-15
Payer: MEDICARE

## 2024-04-15 VITALS
DIASTOLIC BLOOD PRESSURE: 71 MMHG | HEIGHT: 72 IN | RESPIRATION RATE: 17 BRPM | BODY MASS INDEX: 29.26 KG/M2 | HEART RATE: 56 BPM | WEIGHT: 216.06 LBS | SYSTOLIC BLOOD PRESSURE: 116 MMHG

## 2024-04-15 DIAGNOSIS — R10.9 CHRONIC ABDOMINAL PAIN: ICD-10-CM

## 2024-04-15 DIAGNOSIS — G89.29 CHRONIC ABDOMINAL PAIN: ICD-10-CM

## 2024-04-15 PROCEDURE — 99213 OFFICE O/P EST LOW 20 MIN: CPT | Mod: PBBFAC | Performed by: ANESTHESIOLOGY

## 2024-04-15 PROCEDURE — 99999 PR PBB SHADOW E&M-EST. PATIENT-LVL III: CPT | Mod: PBBFAC,,, | Performed by: ANESTHESIOLOGY

## 2024-04-15 PROCEDURE — 99214 OFFICE O/P EST MOD 30 MIN: CPT | Mod: S$PBB,,, | Performed by: ANESTHESIOLOGY

## 2024-04-15 RX ORDER — DIPHENHYDRAMINE HCL 25 MG
25 CAPSULE ORAL ONCE
Qty: 1 CAPSULE | Refills: 0 | Status: SHIPPED | OUTPATIENT
Start: 2024-04-15 | End: 2024-04-15

## 2024-04-15 RX ORDER — PREDNISONE 10 MG/1
50 TABLET ORAL 3 TIMES DAILY
Qty: 15 TABLET | Refills: 0 | Status: SHIPPED | OUTPATIENT
Start: 2024-04-15 | End: 2024-04-16

## 2024-04-15 RX ORDER — FAMOTIDINE 40 MG/1
40 TABLET, FILM COATED ORAL ONCE
Qty: 1 TABLET | Refills: 0 | Status: SHIPPED | OUTPATIENT
Start: 2024-04-15 | End: 2024-04-15

## 2024-04-15 RX ORDER — PREDNISONE 20 MG/1
20 TABLET ORAL DAILY
Status: CANCELLED | OUTPATIENT
Start: 2024-04-15

## 2024-04-15 RX ORDER — TIZANIDINE 4 MG/1
8 TABLET ORAL 3 TIMES DAILY
Qty: 90 TABLET | Refills: 3 | Status: SHIPPED | OUTPATIENT
Start: 2024-04-15 | End: 2024-06-12 | Stop reason: SDUPTHER

## 2024-04-15 RX ORDER — HYDROCODONE BITARTRATE AND ACETAMINOPHEN 5; 325 MG/1; MG/1
1 TABLET ORAL EVERY 8 HOURS PRN
Qty: 21 TABLET | Refills: 0 | Status: SHIPPED | OUTPATIENT
Start: 2024-04-15 | End: 2024-06-04

## 2024-04-15 NOTE — TELEPHONE ENCOUNTER
Staff message sent, provider will discuss with radiology.  This patient is scheduled for a ct tomorrow with contrast and is allergic to the dye. I wanted to know if the dr wanted to do it without contrast or give her the premedication protocol of benadryl and steroids.

## 2024-04-15 NOTE — PROGRESS NOTES
Interventional Pain Progress Note       Referring Physician: No ref. provider found    PCP: Scott Escobar MD    Chief Complaint:   Abdominal Pain       SUBJECTIVE:    Interval History (04/15/2024):  Patient returns to clinic after procedure.  Patient reports 65% relief after left ultrasound-guided transverse abdominis plane block on 03/14/2024.  Patient reports that she has not having as many episodes of left-sided abdominal pain, but reports that the pain intensity has remained constant throughout this time.  Pain described as stabbing aching pain in the left lower quadrant of the abdomen.  Patient denies any significant radiation of the pain.  Pain is typically worse at night, better during the day.  Pain is currently rated a 4/10. Denies any fevers, chills, changes in gait, saddle anesthesia, or bowel and bladder incontinence        Interval History (12/05/2023):  Patient returns to clinic after procedure.  Patient reports 75% relief in abdominal pain after left transversus abdominis plane block on 10/19/2023.  Patient reports that sharp pain over left abdomen has significantly improved.  Patient reports that she is now able to eat more foods after this injection.  Patient reports she still can not eat rice without significant abdominal pain.  Pain is currently rated a 2/10.      Initial HPI:     Viri Carlson is a 49 y.o. female who presents to the clinic for the evaluation of abdominal pain.  Patient reports over 10 year history of abdominal pain.  Patient has undergone multiple abdominal surgeries after complications from gastric sleeve surgery including appendectomy, cholecystectomy, and multiple lysis of adhesions.  Patient has also undergone left lobe lobectomy.  Most recent lysis of adhesions was then 2019.  Pain is described as a aching throbbing pain primarily in the left upper quadrant of the abdomen.  Patient reports that as pain worsens this pain will radiate to the left lower  quadrant and occasionally radiates to her right abdomen as well.  Patient reports pain is worse with certain foods including rice and with medications that lead to bowel changes, pain is typically better  with hydration.  Pain is currently rated a 5/10, but increase to a 10/10 with exacerbating activity. Denies any fevers, chills, changes in gait, saddle anesthesia, or bowel and bladder incontinence    Non-Pharmacologic Treatments:  Physical Therapy/Home Exercise: yes  Ice/Heat:yes  TENS: no  Acupuncture: no  Massage: yes  Chiropractic: no        Previous Pain Medications:  NSAIDs, Tylenol, muscle relaxers, neuropathics, opioids, topicals       report:  Reviewed and consistent with medication use as prescribed.    Pain Procedures:   -03/14/2024:  Left transversus abdominis plane block, 65% relief  -10/19/2023:  Left transversus abdominis plane block, 75% relief    Pain Disability Index Review:         4/15/2024    11:57 AM 12/5/2023     2:00 PM 10/3/2023     1:09 PM   Last 3 PDI Scores   Pain Disability Index (PDI) 25 42 43       Imaging:   CTA CHEST ABDOMEN PELVIS  Order: [id number]  Impression    Preliminary impression:   1.  Negative for intramural hematoma or aortic dissection/aneurysm.   2.  Status post CABG, cholecystectomy, gastric bypass surgery, and   bowel anastomoses which are without change.   3.  Left common iliac and external vein stent is unchanged.   Colonic diverticulosis without diverticulitis.     ------THE FOLLOWING REPRESENTS THE FINAL ATTENDING REPORT------       Provided history:   45 years old Female with provided history of Thoracic aortic aneurysm   suspected, initial exam, Patient with a history of Marfan syndrome,   chest pain radiating towards back, negative troponins, pain not   relieved with medication.     Comparison:   CT chest 4/20/2020, CT abdomen pelvis 9/22/2019.     Technique:   Routine nonenhanced CT through the chest was performed. Subsequently,   enhanced CT through the  chest, abdomen, and pelvis was performed in   the arterial phase of contrast per protocol. Multiplanar   reconstructions were provided. 3-D reconstructions were performed on   an independent workstation.     Dose reduction techniques were utilized for this exam including   automated exposure control, adjustments to mA and/or kV according to   patient's size, and the use of iterative reconstruction techniques.     Findings:     There is no thoracic aortic intramural hematoma, dissection, or   penetrating ulcer. The thoracic aorta and pulmonary artery are normal   in caliber. There is no aortic calcific atherosclerosis. Prior   sternotomy and coronary artery bypass grafting The proximal   supraaortic vessels are patent. There are no central pulmonary   arterial filling defects.     The abdominal aorta is normal in course and caliber without dissection   or penetrating ulcer. There is no calcific atherosclerosis of the   abdominal aorta and its proximal branches. A left iliac/common iliac   stent is present. Luminal evaluation of this stent is limited given   the arterial phase of contrast. The celiac, SMA, single right and   duplicated left renal, and HAZEL are patent.     The heart is normal in size. The pericardium is unremarkable. There is   no mediastinal or hilar lymphadenopathy.     Bilateral lungs are clear of focal consolidation. There are no pleural   effusions or pneumothorax. There is minimal right basilar linear   parenchymal scarring versus atelectasis.     Previous gastric bypass and multiple small/large bowel anastomoses   with unremarkable-appearing suture beds and no associated obstruction.   Scattered colonic diverticulosis without diverticulitis.     Prior cholecystectomy with mild expected prominence of the biliary   system.     Liver, spleen, pancreas, adrenal glands, and kidneys are unremarkable.     Urinary bladder is unremarkable. Stable mildly enlarged multifibroid   uterus.     No free air or  free fluid.     No acute osseous abnormalities.     Impression:   1.  Negative for acute aortic pathology.   2.  No acute findings of the chest, abdomen, or pelvis.      Past Medical History:   Diagnosis Date    Anemia 02/01/1990    Anxiety 08/01/2010    Depression 2008    Encounter for blood transfusion 01/15/2008    Endometriosis of uterus 05/01/1999    Fibroid 2010    Gestational diabetes     Heart disease     Heart murmur 01/14/2008    Hypertension 2009    Marfan syndrome     Varicose veins - Both legs      Past Surgical History:   Procedure Laterality Date    ABDOMINAL SURGERY  06/04/2006    several-had abscesses and complications after wt loss surgery; repair of small bowl injury; subsequent colon resection 6/14/19    APPENDECTOMY  2009    Rupture    BREAST BIOPSY Left 2019    CERVIX LESION DESTRUCTION      cryo for cervical dysplasia    CHOLECYSTECTOMY      COLON SURGERY  2006    COLONOSCOPY  2018    Normal    COLONOSCOPY N/A 12/13/2022    Procedure: COLONOSCOPY 10/29-card clearance received;  Surgeon: Ruben Chavez MD;  Location: [facility];  Service: Endoscopy;  Laterality: N/A;    COLONOSCOPY N/A 07/11/2023    Procedure: COLONOSCOPY;  Surgeon: Angy Ellis MD;  Location: [facility];  Service: Endoscopy;  Laterality: N/A;    CORONARY ARTERY BYPASS GRAFT (CABG)  2008    DILATION AND CURETTAGE OF UTERUS  2008    Miscarriage    ESOPHAGOGASTRODUODENOSCOPY N/A 07/11/2023    Procedure: EGD (ESOPHAGOGASTRODUODENOSCOPY);  Surgeon: Angy Ellis MD;  Location: [facility];  Service: Endoscopy;  Laterality: N/A;    INJECTION OF ANESTHETIC AGENT INTO TISSUE PLANE DEFINED BY TRANSVERSUS ABDOMINIS MUSCLE Left 10/9/2023    Procedure: Left TRANSVERSUS ABDOMINIS PLANE Block under Ultrasound;  Surgeon: Jose De Luna MD;  Location: [facility];  Service: Pain Management;  Laterality: Left;    INJECTION OF ANESTHETIC AGENT INTO TISSUE PLANE DEFINED BY TRANSVERSUS ABDOMINIS MUSCLE Left 3/14/2024     Procedure: Ultrasound-Guided Left TRANSVERSUS ABDOMINIS PLANE Block;  Surgeon: Jose De Luna MD;  Location: [facility];  Service: Pain Management;  Laterality: Left;    INSERTION OF VENOUS ACCESS PORT Left     Power Port 2012- Left Chestwall placed in [place]    LUNG REMOVAL, PARTIAL Right     2/3 of right lung removed; thought to be lung cancer, final path equals histoplasmosis    CHANDA-EN-Y PROCEDURE       Social History     Socioeconomic History    Marital status: Legally    Tobacco Use    Smoking status: Never    Smokeless tobacco: Never    Tobacco comments:     Mother was a amoker.  I have used chewing tobacco   Substance and Sexual Activity    Alcohol use: Not Currently     Comment: occasion    Drug use: Never    Sexual activity: Not Currently     Partners: Male     Birth control/protection: Abstinence, None     Comment:       Social Determinants of Health     Financial Resource Strain: Low Risk  (3/28/2024)    Overall Financial Resource Strain (CARDIA)     Difficulty of Paying Living Expenses: Not very hard   Food Insecurity: No Food Insecurity (3/28/2024)    Hunger Vital Sign     Worried About Running Out of Food in the Last Year: Never true     Ran Out of Food in the Last Year: Never true   Transportation Needs: No Transportation Needs (3/28/2024)    PRAPARE - Transportation     Lack of Transportation (Medical): No     Lack of Transportation (Non-Medical): No   Physical Activity: Insufficiently Active (3/28/2024)    Exercise Vital Sign     Days of Exercise per Week: 3 days     Minutes of Exercise per Session: 30 min   Stress: Stress Concern Present (3/28/2024)    [employer] [employer] [place] [employer] - Occupational Stress Questionnaire     Feeling of Stress : To some extent   Social Connections: Moderately Integrated (3/28/2024)    Social Connection and Isolation Panel [NHANES]     Frequency of Communication with Friends and Family: More than three times a week     Frequency  of Social Gatherings with Friends and Family: Patient declined     Attends Taoism Services: More than 4 times per year     Active Member of Clubs or Organizations: Yes     Attends Club or Organization Meetings: 1 to 4 times per year     Marital Status:    Housing Stability: High Risk (3/28/2024)    Housing Stability Vital Sign     Unable to Pay for Housing in the Last Year: Yes     Number of Places Lived in the Last Year: 1     Unstable Housing in the Last Year: No     Family History   Problem Relation Name Age of Onset    Hypertension Mother  from Lung Cancer     Rheum arthritis Mother  from Lung Cancer     Cancer Mother  from Lung Cancer     Diabetes Mother  from Lung Cancer         Type 2 w/insulin    Lung cancer Mother  from Lung Cancer         Lung cancer complications at 63    Heart attack Maternal Grandmother Rick Bird     Diabetes Maternal Grandmother Rick Bird         Brother Type 2    Hyperlipidemia Maternal Grandfather Harvinder Bird     Heart failure Maternal Grandfather Harvinder Bird     Heart failure Paternal Grandmother Myrna Bird          from Heart Attack    Rheum arthritis Maternal Uncle Jose D Bird         Blood sepsis    Cancer Maternal Aunt Janae Jasmine         Ovarian cancer    Cancer Maternal Aunt Teresa Block         Brain cancer    Migraines Brother Mamadou Rodriguez     Diabetes Brother Rick Bird         Oral meds       Review of patient's allergies indicates:   Allergen Reactions    Aspirin Anaphylaxis and Swelling     Tongue swelling      Cephalexin Anaphylaxis, Nausea And Vomiting, Hives and Other (See Comments)    Iodine and iodide containing products Other (See Comments)     Pt is not allergic to contrast dye IV     Levofloxacin Anaphylaxis    Sulfa (sulfonamide antibiotics) Anaphylaxis, Blisters, Dermatitis, Itching, Shortness Of Breath and Other (See Comments)    Sulfamethoxazole-trimethoprim Anaphylaxis     3rd degree skin  burning when given IV      Sulfasalazine Anaphylaxis    Tramadol Nausea And Vomiting    Adhesive Dermatitis, Hives, Itching and Other (See Comments)     bandaids and adhesive on electrodes  bandaids and adhesive on electrodes, whelps      Adhesive tape-silicones Itching    Barium iodide Nausea And Vomiting     Oral only causes vomiting       Current Outpatient Medications   Medication Sig Dispense Refill    bumetanide (BUMEX) 1 MG tablet Take 1 tablet (1 mg total) by mouth once daily. 90 tablet 3    calcium carbonate (OS-ESE) 600 mg calcium (1,500 mg) Tab Take 600 mg by mouth.      cyanocobalamin 1,000 mcg/mL injection Inject 1 mL (1,000 mcg total) into the muscle every 30 days. 3 mL 3    ergocalciferol, vitamin D2, (VITAMIN D ORAL)       ferrous sulfate (FEOSOL) 325 mg (65 mg iron) Tab tablet Take 325 mg by mouth.      gabapentin (NEURONTIN) 600 MG tablet Take 1 tablet (600 mg total) by mouth 3 (three) times daily. 270 tablet 3    iron,carb/vit C/vit B12/folic (IRON 100 PLUS ORAL)       pantoprazole (PROTONIX) 40 MG tablet Take 1 tablet (40 mg total) by mouth once daily. 30 tablet 11    potassium chloride (KLOR-CON) 20 mEq Pack Take 40 mEq by mouth once daily. 90 packet 3    promethazine (PHENERGAN) 12.5 MG Supp Place 1 suppository (12.5 mg total) rectally every 6 (six) hours as needed. 10 suppository 0    promethazine (PHENERGAN) 25 MG tablet Take 1 tablet (25 mg total) by mouth every 6 (six) hours as needed for Nausea. 20 tablet 0    spironolactone (ALDACTONE) 25 MG tablet Take 1 tablet (25 mg total) by mouth once daily. 90 tablet 3    HYDROcodone-acetaminophen (NORCO) 5-325 mg per tablet Take 1 tablet by mouth every 8 (eight) hours as needed for Pain. 21 tablet 0    tiZANidine (ZANAFLEX) 4 MG tablet Take 2 tablets (8 mg total) by mouth 3 (three) times daily. 90 tablet 3    traZODone (DESYREL) 150 MG tablet Take 1 tablet (150 mg total) by mouth every evening. 90 tablet 3    venlafaxine (EFFEXOR-XR) 75 MG 24 hr  "capsule Take 1 capsule (75 mg total) by mouth once daily. 90 capsule 3     No current facility-administered medications for this visit.         ROS  Review of Systems   Constitutional:  Negative for chills, diaphoresis, fatigue and fever.   Respiratory:  Negative for chest tightness, shortness of breath, wheezing and stridor.    Cardiovascular:  Negative for chest pain and leg swelling.   Gastrointestinal:  Positive for abdominal distention, abdominal pain and nausea. Negative for blood in stool, diarrhea and vomiting.   Endocrine: Negative for cold intolerance and heat intolerance.   Genitourinary:  Negative for dysuria, hematuria and urgency.   Musculoskeletal:  Positive for arthralgias and myalgias. Negative for back pain, gait problem, joint swelling, neck pain and neck stiffness.   Skin:  Negative for rash.   Neurological:  Negative for tremors, seizures, speech difficulty, weakness, light-headedness, numbness and headaches.   Hematological:  Does not bruise/bleed easily.   Psychiatric/Behavioral:  Negative for agitation, confusion and suicidal ideas.             OBJECTIVE:  /71   Pulse (!) 56   Resp 17   Ht 6' 4" (1.93 m)   Wt 98 kg (216 lb 0.8 oz)   BMI 26.30 kg/m²         Physical Exam  Constitutional:       General: She is not in acute distress.     Appearance: Normal appearance. She is not ill-appearing.   HENT:      Head: Normocephalic and atraumatic.      Nose: No congestion or rhinorrhea.   Eyes:      Extraocular Movements: Extraocular movements intact.      Pupils: Pupils are equal, round, and reactive to light.   Pulmonary:      Effort: Pulmonary effort is normal.   Abdominal:      Comments: Midline laparotomy in incision is clean dry and intact.  Tenderness over left abdomen improved compared to prior exam.  No significant tenderness over the right upper and lower quadrant.  No significant rebound tenderness   Skin:     General: Skin is warm and dry.      Capillary Refill: Capillary refill " takes less than 2 seconds.   Neurological:      General: No focal deficit present.      Mental Status: She is alert and oriented to person, place, and time.      Sensory: No sensory deficit.   Psychiatric:         Mood and Affect: Mood normal.         Behavior: Behavior normal.         Thought Content: Thought content normal.           LABS:  Lab Results   Component Value Date    WBC 4.12 02/19/2024    HGB 12.9 02/19/2024    HCT 42.8 02/19/2024    MCV 84 02/19/2024     02/19/2024       CMP  Sodium   Date Value Ref Range Status   02/19/2024 136 136 - 145 mmol/L Final     Potassium   Date Value Ref Range Status   02/19/2024 4.4 3.5 - 5.1 mmol/L Final     Chloride   Date Value Ref Range Status   02/19/2024 103 95 - 110 mmol/L Final     CO2   Date Value Ref Range Status   02/19/2024 27 23 - 29 mmol/L Final     Glucose   Date Value Ref Range Status   02/19/2024 92 70 - 110 mg/dL Final     BUN   Date Value Ref Range Status   02/19/2024 10 6 - 20 mg/dL Final     Creatinine   Date Value Ref Range Status   02/19/2024 0.7 0.5 - 1.4 mg/dL Final     Calcium   Date Value Ref Range Status   02/19/2024 9.6 8.7 - 10.5 mg/dL Final     Total Protein   Date Value Ref Range Status   02/19/2024 7.1 6.0 - 8.4 g/dL Final     Albumin   Date Value Ref Range Status   02/19/2024 3.7 3.5 - 5.2 g/dL Final     Total Bilirubin   Date Value Ref Range Status   02/19/2024 0.4 0.1 - 1.0 mg/dL Final     Comment:     For infants and newborns, interpretation of results should be based  on gestational age, weight and in agreement with clinical  observations.    Premature Infant recommended reference ranges:  Up to 24 hours.............<8.0 mg/dL  Up to 48 hours............<12.0 mg/dL  3-5 days..................<15.0 mg/dL  6-29 days.................<15.0 mg/dL       Alkaline Phosphatase   Date Value Ref Range Status   02/19/2024 117 55 - 135 U/L Final     AST   Date Value Ref Range Status   02/19/2024 19 10 - 40 U/L Final     ALT   Date Value Ref  Range Status   02/19/2024 15 10 - 44 U/L Final     Anion Gap   Date Value Ref Range Status   02/19/2024 6 (L) 8 - 16 mmol/L Final     eGFR if    Date Value Ref Range Status   03/18/2022 >60.0 >60 mL/min/1.73 m^2 Final     eGFR if non    Date Value Ref Range Status   03/18/2022 >60.0 >60 mL/min/1.73 m^2 Final     Comment:     Calculation used to obtain the estimated glomerular filtration  rate (eGFR) is the CKD-EPI equation.          Lab Results   Component Value Date    HGBA1C 6.0 (H) 02/19/2024             ASSESSMENT:       49 y.o. year old female with abdominal pain, consistent with     1. Chronic abdominal pain  HYDROcodone-acetaminophen (NORCO) 5-325 mg per tablet    tiZANidine (ZANAFLEX) 4 MG tablet        Chronic abdominal pain  -     HYDROcodone-acetaminophen (NORCO) 5-325 mg per tablet; Take 1 tablet by mouth every 8 (eight) hours as needed for Pain.  Dispense: 21 tablet; Refill: 0  -     tiZANidine (ZANAFLEX) 4 MG tablet; Take 2 tablets (8 mg total) by mouth 3 (three) times daily.  Dispense: 90 tablet; Refill: 3             PLAN:   - Interventions:    None at this time.  -03/14/2024:  Left transversus abdominis plane block, 65% relief  -10/19/2023:  Left transversus abdominis plane block, 75% relief  Can consider quadratus lumborum block versus peripheral nerve stimulation versus dorsal root ganglion stimulation if pain continues  - Anticoagulation use:   No no anticoagulation    - Medications:   Continue gabapentin 600 mg 3 times a day   Continue tizanidine 4 mg 3 times a day p.r.n.   Refill Norco 5mg PO q8h PRN, #21, no refills, 04/15/2024  . Patient understands that this medication is for breakthrough pain only, and should not be taken regularly.      - Therapy:    Continue abdominal therapy exercises from general surgery    - Imaging/Diagnostic:   CT of abdomen and pelvis    - Consults:   Reviewed continue follow up with Gastroenterology and General surgery for abdominal  pain in patient with history of multiple previous abdominal surgeries        - Patient Questions: Answered all of the patient's questions regarding diagnosis, therapy, and treatment     This condition does not require this patient to take time off of work, and the primary goal of our Pain Management services is to improve the patient's functional capacity.     - Follow up visit: return to clinic in 8 weeks        The above plan and management options were discussed at length with patient. Patient is in agreement with the above and verbalized understanding.    I discussed the goals of interventional chronic pain management with the patient on today's visit.  I explained the utility of injections for diagnostic and therapeutic purposes.  We discussed a multimodal approach to pain including treating the patient's given worst pain at any given time.  We will use a systematic approach to addressing pain.  We will also adopt a multimodal approach that includes injections, adjuvant medications, physical therapy, at times psychiatry.  There may be a limited role for opioid use intermittently in the treatment of pain, more particularly for acute pain although no one approach can be used as a sole treatment modality.    I emphasized the importance of regular exercise, core strengthening and stretching, diet and weight loss as a cornerstone of long-term pain management.      Jose De Luna MD  Interventional Pain Management  [facility]    Disclaimer:  This note was prepared using voice recognition system and is likely to have sound alike errors that may have been overlooked even after proof reading.  Please call me with any questions

## 2024-04-16 ENCOUNTER — HOSPITAL ENCOUNTER (OUTPATIENT)
Dept: RADIOLOGY | Facility: HOSPITAL | Age: 49
Discharge: HOME | End: 2024-04-16
Attending: THORACIC SURGERY (CARDIOTHORACIC VASCULAR SURGERY)
Payer: MEDICARE

## 2024-04-16 DIAGNOSIS — R91.1 SOLITARY PULMONARY NODULE: ICD-10-CM

## 2024-04-16 PROCEDURE — 71260 CT THORAX DX C+: CPT | Mod: 26,,, | Performed by: RADIOLOGY

## 2024-04-16 PROCEDURE — 71260 CT THORAX DX C+: CPT | Mod: TC,PN

## 2024-04-16 PROCEDURE — 25500020 PHARM REV CODE 255: Mod: PN | Performed by: THORACIC SURGERY (CARDIOTHORACIC VASCULAR SURGERY)

## 2024-04-16 RX ADMIN — IOHEXOL 75 ML: 350 INJECTION, SOLUTION INTRAVENOUS at 02:04

## 2024-04-18 ENCOUNTER — OFFICE VISIT (OUTPATIENT)
Dept: PRIMARY CARE CLINIC | Facility: CLINIC | Age: 49
End: 2024-04-18
Payer: MEDICARE

## 2024-04-18 VITALS
BODY MASS INDEX: 29.41 KG/M2 | HEART RATE: 54 BPM | HEIGHT: 72 IN | WEIGHT: 217.13 LBS | SYSTOLIC BLOOD PRESSURE: 120 MMHG | DIASTOLIC BLOOD PRESSURE: 80 MMHG

## 2024-04-18 DIAGNOSIS — I10 ESSENTIAL (PRIMARY) HYPERTENSION: Primary | ICD-10-CM

## 2024-04-18 DIAGNOSIS — F41.9 ANXIETY AND DEPRESSION: ICD-10-CM

## 2024-04-18 DIAGNOSIS — G47.9 SLEEP DIFFICULTIES: ICD-10-CM

## 2024-04-18 DIAGNOSIS — I87.2 VENOUS INSUFFICIENCY: ICD-10-CM

## 2024-04-18 DIAGNOSIS — F32.A ANXIETY AND DEPRESSION: ICD-10-CM

## 2024-04-18 DIAGNOSIS — Q87.40 MARFAN'S SYNDROME: ICD-10-CM

## 2024-04-18 PROBLEM — F33.0 MILD EPISODE OF RECURRENT MAJOR DEPRESSIVE DISORDER: Status: RESOLVED | Noted: 2023-08-21 | Resolved: 2024-04-18

## 2024-04-18 PROCEDURE — 99999 PR PBB SHADOW E&M-EST. PATIENT-LVL IV: CPT | Mod: PBBFAC,,, | Performed by: FAMILY MEDICINE

## 2024-04-18 PROCEDURE — G2211 COMPLEX E/M VISIT ADD ON: HCPCS | Mod: S$PBB,,, | Performed by: FAMILY MEDICINE

## 2024-04-18 PROCEDURE — 99214 OFFICE O/P EST MOD 30 MIN: CPT | Mod: PBBFAC,PN | Performed by: FAMILY MEDICINE

## 2024-04-18 PROCEDURE — 99215 OFFICE O/P EST HI 40 MIN: CPT | Mod: S$PBB,,, | Performed by: FAMILY MEDICINE

## 2024-04-18 RX ORDER — VENLAFAXINE HYDROCHLORIDE 150 MG/1
150 CAPSULE, EXTENDED RELEASE ORAL DAILY
Qty: 90 CAPSULE | Refills: 3 | Status: SHIPPED | OUTPATIENT
Start: 2024-04-18 | End: 2025-04-18

## 2024-04-18 RX ORDER — POTASSIUM CHLORIDE 1.5 G/1.58G
40 POWDER, FOR SOLUTION ORAL DAILY
Qty: 90 PACKET | Refills: 3 | Status: SHIPPED | OUTPATIENT
Start: 2024-04-18

## 2024-04-18 RX ORDER — BUMETANIDE 1 MG/1
1 TABLET ORAL DAILY
Qty: 90 TABLET | Refills: 3 | Status: SHIPPED | OUTPATIENT
Start: 2024-04-18

## 2024-04-18 RX ORDER — DOXEPIN HYDROCHLORIDE 150 MG/1
150 CAPSULE ORAL NIGHTLY
Qty: 90 CAPSULE | Refills: 3 | Status: SHIPPED | OUTPATIENT
Start: 2024-04-18 | End: 2025-04-18

## 2024-04-18 RX ORDER — SPIRONOLACTONE 25 MG/1
25 TABLET ORAL DAILY
Qty: 90 TABLET | Refills: 3 | Status: SHIPPED | OUTPATIENT
Start: 2024-04-18

## 2024-04-18 NOTE — PATIENT INSTRUCTIONS
Overall, everything looks pretty good today.      Refills of your medicines have been sent to the pharmacy.  Please continue taking them, as directed.      Let us go ahead and increase the Effexor to 150 mg daily.  We can also stop the trazodone and go back to doxepin.  That should help the Effexor work better.  It should also help with sleep.      I sent the [facility]in [facility].  Please go to the website below to create an account and they will ship it to you.      [email or web address]    If you would like to get the Marfan question resolved, let us just go see a .  They can do some genetic testing to confirm whether or not you have Marfan.  Referral placed today.    Continue to eat a healthy diet.  Be careful with portion sizes.  Includes lots of fresh fruits, vegetables, whole grains, lean proteins.  See info below.    Keep hydrated.  Be sure to drink at least 8-10, 8 oz, glasses of water every day.    Stay active.  Try to do some sort of physical activity every day.  Nothing outrageous, just try walking for 10-15 minutes each day.
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
## 2024-04-25 ENCOUNTER — HOSPITAL ENCOUNTER (OUTPATIENT)
Dept: CARDIOLOGY | Facility: HOSPITAL | Age: 49
Discharge: HOME | End: 2024-04-25
Attending: THORACIC SURGERY (CARDIOTHORACIC VASCULAR SURGERY)
Payer: MEDICARE

## 2024-04-25 VITALS
BODY MASS INDEX: 29.39 KG/M2 | SYSTOLIC BLOOD PRESSURE: 120 MMHG | DIASTOLIC BLOOD PRESSURE: 80 MMHG | HEIGHT: 72 IN | WEIGHT: 217 LBS | HEART RATE: 55 BPM

## 2024-04-25 DIAGNOSIS — I25.118 CORONARY ARTERY DISEASE OF NATIVE ARTERY OF NATIVE HEART WITH STABLE ANGINA PECTORIS: ICD-10-CM

## 2024-04-25 DIAGNOSIS — Z95.1 HX OF CABG: ICD-10-CM

## 2024-04-25 DIAGNOSIS — Q87.40 MARFAN'S SYNDROME: ICD-10-CM

## 2024-04-25 LAB
AORTIC ROOT ANNULUS: 3.34 CM
ASCENDING AORTA: 2.97 CM
AV INDEX (PROSTH): 0.7
AV MEAN GRADIENT: 8 MMHG
AV PEAK GRADIENT: 14 MMHG
AV VALVE AREA BY VELOCITY RATIO: 2.08 CM²
AV VALVE AREA: 2.04 CM²
AV VELOCITY RATIO: 0.71
BSA FOR ECHO PROCEDURE: 2.3 M2
CV ECHO LV RWT: 0.4 CM
DOP CALC AO PEAK VEL: 1.88 M/S
DOP CALC AO VTI: 46.5 CM
DOP CALC LVOT AREA: 2.9 CM2
DOP CALC LVOT DIAMETER: 1.93 CM
DOP CALC LVOT PEAK VEL: 1.34 M/S
DOP CALC LVOT STROKE VOLUME: 95.03 CM3
DOP CALC RVOT PEAK VEL: 0.73 M/S
DOP CALC RVOT VTI: 19 CM
DOP CALCLVOT PEAK VEL VTI: 32.5 CM
E WAVE DECELERATION TIME: 233.18 MSEC
E/A RATIO: 1.89
E/E' RATIO: 15.16 M/S
ECHO LV POSTERIOR WALL: 0.99 CM (ref 0.6–1.1)
EJECTION FRACTION: 65 %
FRACTIONAL SHORTENING: 38 % (ref 28–44)
INTERVENTRICULAR SEPTUM: 1.29 CM (ref 0.6–1.1)
IVC DIAMETER: 1.88 CM
IVRT: 36.16 MSEC
LA MAJOR: 6.07 CM
LA MINOR: 5.97 CM
LA WIDTH: 4 CM
LEFT ATRIUM SIZE: 4.67 CM
LEFT ATRIUM VOLUME INDEX MOD: 42.8 ML/M2
LEFT ATRIUM VOLUME INDEX: 41.7 ML/M2
LEFT ATRIUM VOLUME MOD: 97.96 CM3
LEFT ATRIUM VOLUME: 95.58 CM3
LEFT INTERNAL DIMENSION IN SYSTOLE: 3.04 CM (ref 2.1–4)
LEFT VENTRICLE DIASTOLIC VOLUME INDEX: 48.92 ML/M2
LEFT VENTRICLE DIASTOLIC VOLUME: 112.03 ML
LEFT VENTRICLE MASS INDEX: 92 G/M2
LEFT VENTRICLE SYSTOLIC VOLUME INDEX: 15.7 ML/M2
LEFT VENTRICLE SYSTOLIC VOLUME: 36.02 ML
LEFT VENTRICULAR INTERNAL DIMENSION IN DIASTOLE: 4.89 CM (ref 3.5–6)
LEFT VENTRICULAR MASS: 209.99 G
LV LATERAL E/E' RATIO: 11.08 M/S
LV SEPTAL E/E' RATIO: 24 M/S
LVOT MG: 3.93 MMHG
LVOT MV: 0.94 CM/S
MV PEAK A VEL: 0.76 M/S
MV PEAK E VEL: 1.44 M/S
MV STENOSIS PRESSURE HALF TIME: 67.62 MS
MV VALVE AREA P 1/2 METHOD: 3.25 CM2
OHS CV RV/LV RATIO: 0.98 CM
PISA TR MAX VEL: 3.47 M/S
PULM VEIN S/D RATIO: 0.66
PV MEAN GRADIENT: 1 MMHG
PV PEAK D VEL: 0.87 M/S
PV PEAK GRADIENT: 7 MMHG
PV PEAK S VEL: 0.57 M/S
PV PEAK VELOCITY: 1.31 M/S
RA MAJOR: 5.07 CM
RA PRESSURE ESTIMATED: 3 MMHG
RA WIDTH: 3.02 CM
RIGHT VENTRICULAR END-DIASTOLIC DIMENSION: 4.77 CM
RV TB RVSP: 6 MMHG
SINUS: 3.18 CM
STJ: 3.21 CM
TDI LATERAL: 0.13 M/S
TDI SEPTAL: 0.06 M/S
TDI: 0.1 M/S
TR MAX PG: 48 MMHG
TRICUSPID ANNULAR PLANE SYSTOLIC EXCURSION: 1.68 CM
TV REST PULMONARY ARTERY PRESSURE: 51 MMHG
Z-SCORE OF LEFT VENTRICULAR DIMENSION IN END DIASTOLE: -5.82
Z-SCORE OF LEFT VENTRICULAR DIMENSION IN END SYSTOLE: -4.37

## 2024-04-25 PROCEDURE — 93306 TTE W/DOPPLER COMPLETE: CPT

## 2024-04-25 PROCEDURE — 93306 TTE W/DOPPLER COMPLETE: CPT | Mod: 26,,, | Performed by: INTERNAL MEDICINE

## 2024-05-08 ENCOUNTER — OFFICE VISIT (OUTPATIENT)
Dept: CARDIOTHORACIC SURGERY | Facility: CLINIC | Age: 49
End: 2024-05-08
Payer: MEDICARE

## 2024-05-08 VITALS
DIASTOLIC BLOOD PRESSURE: 78 MMHG | RESPIRATION RATE: 16 BRPM | SYSTOLIC BLOOD PRESSURE: 138 MMHG | OXYGEN SATURATION: 97 % | HEIGHT: 72 IN | BODY MASS INDEX: 29.62 KG/M2 | HEART RATE: 61 BPM | WEIGHT: 218.69 LBS

## 2024-05-08 DIAGNOSIS — I27.20 PULMONARY HYPERTENSION: ICD-10-CM

## 2024-05-08 DIAGNOSIS — Q87.40 MARFAN'S SYNDROME: Primary | ICD-10-CM

## 2024-05-08 PROCEDURE — 99999 PR PBB SHADOW E&M-EST. PATIENT-LVL IV: CPT | Mod: PBBFAC,,, | Performed by: THORACIC SURGERY (CARDIOTHORACIC VASCULAR SURGERY)

## 2024-05-08 PROCEDURE — 99214 OFFICE O/P EST MOD 30 MIN: CPT | Mod: S$PBB,,, | Performed by: THORACIC SURGERY (CARDIOTHORACIC VASCULAR SURGERY)

## 2024-05-08 PROCEDURE — 99214 OFFICE O/P EST MOD 30 MIN: CPT | Mod: PBBFAC | Performed by: THORACIC SURGERY (CARDIOTHORACIC VASCULAR SURGERY)

## 2024-05-08 NOTE — PROGRESS NOTES
"Subjective:      Patient ID: Viri Carlson is a 49 y.o. female.    Chief Complaint: No chief complaint on file.    HPI:   The patient is a 49-year-old female who was seen in clinic with a history of Marfan syndrome and coronary artery bypass grafting in 2008.  The patient is now in clinic for follow-up review of echocardiogram and CT scan of the chest.  The patient denies any new symptomatology.  Review of patient's allergies indicates:   Allergen Reactions    Aspirin Anaphylaxis and Swelling     Tongue swelling      Cephalexin Anaphylaxis, Nausea And Vomiting, Hives and Other (See Comments)    Iodine and iodide containing products Other (See Comments)     Pt is not allergic to contrast dye IV     Levofloxacin Anaphylaxis    Sulfa (sulfonamide antibiotics) Anaphylaxis, Blisters, Dermatitis, Itching, Shortness Of Breath and Other (See Comments)    Sulfamethoxazole-trimethoprim Anaphylaxis     3rd degree skin burning when given IV      Sulfasalazine Anaphylaxis    Tramadol Nausea And Vomiting    Adhesive Dermatitis, Hives, Itching and Other (See Comments)     bandaids and adhesive on electrodes  bandaids and adhesive on electrodes, whelps      Adhesive tape-silicones Itching    Barium iodide Nausea And Vomiting     Oral only causes vomiting            Objective:   /78   Pulse 61   Resp 16   Ht 6' 4" (1.93 m)   Wt 99.2 kg (218 lb 11.1 oz)   SpO2 97%   BMI 26.62 kg/m²     Echo    Left Ventricle: The left ventricle is normal in size. Normal wall   thickness. Regional wall motion abnormalities present. Septal motion is   consistent with post-operative status. There is normal systolic function   with a visually estimated ejection fraction of 55 - 70%. Ejection fraction   by visual approximation is 65%. Grade II diastolic dysfunction.    Right Ventricle: Normal right ventricular cavity size. Wall thickness   is normal. Systolic function is normal.    Aortic Valve: The aortic valve is a trileaflet valve. " Mildly restricted   motion.    Mitral Valve: There is mild regurgitation.    Tricuspid Valve: There is moderate regurgitation. There is moderate   pulmonary hypertension.    Pulmonary Artery: The estimated pulmonary artery systolic pressure is   51 mmHg.    IVC/SVC: Normal venous pressure at 3 mmHg.         Physical Exam  Constitutional:       Appearance: Normal appearance.   HENT:      Head: Normocephalic and atraumatic.      Nose: Nose normal.   Cardiovascular:      Rate and Rhythm: Normal rate and regular rhythm.   Pulmonary:      Effort: Pulmonary effort is normal.      Breath sounds: Normal breath sounds.   Abdominal:      General: Abdomen is flat.      Palpations: Abdomen is soft.   Musculoskeletal:      Right lower leg: No edema.      Left lower leg: No edema.   Neurological:      General: No focal deficit present.      Mental Status: She is alert and oriented to person, place, and time.   Psychiatric:         Behavior: Behavior normal.         Assessment:     1. Pulmonary hypertension          Plan   The patient is a 49-year-old female with a history of dilated ascending aorta.  However, on current CT scan of the chest patient's ascending aorta measures 34 mm at the level of the main PA artery.  In addition, patient has aortic root measures 35 mm at its largest extent.  Hence patient does not appear to have an aortic root or ascending aorta aneurysm.    Echocardiogram shows patient has moderate tricuspid regurgitation with pulmonary hypertension.  Will refer patients to outpatient pulmonary clinic for follow-up.    Patient also carries a diagnosis of Marfan syndrome.  However, However as per patient genetic testing of family members and herself have been negative, in addition as per patient, testing for ectopia lentis has also been negative.  Hence, the diagnosis of Marfan's has now been definitively established.  Patient has a pending consult to genetics.        Mark Awolesi, [facility]

## 2024-05-09 ENCOUNTER — OFFICE VISIT (OUTPATIENT)
Dept: PULMONOLOGY | Facility: CLINIC | Age: 49
End: 2024-05-09
Payer: MEDICARE

## 2024-05-09 VITALS
BODY MASS INDEX: 29.97 KG/M2 | SYSTOLIC BLOOD PRESSURE: 118 MMHG | WEIGHT: 221.31 LBS | RESPIRATION RATE: 18 BRPM | HEART RATE: 60 BPM | HEIGHT: 72 IN | OXYGEN SATURATION: 97 % | DIASTOLIC BLOOD PRESSURE: 78 MMHG

## 2024-05-09 DIAGNOSIS — I27.20 PULMONARY HYPERTENSION: ICD-10-CM

## 2024-05-09 PROCEDURE — 99213 OFFICE O/P EST LOW 20 MIN: CPT | Mod: S$PBB,,, | Performed by: INTERNAL MEDICINE

## 2024-05-09 PROCEDURE — 99214 OFFICE O/P EST MOD 30 MIN: CPT | Mod: PBBFAC | Performed by: INTERNAL MEDICINE

## 2024-05-09 PROCEDURE — 99999 PR PBB SHADOW E&M-EST. PATIENT-LVL IV: CPT | Mod: PBBFAC,,, | Performed by: INTERNAL MEDICINE

## 2024-05-09 NOTE — PROGRESS NOTES
Subjective:      Patient ID: Viri Carlson is a 49 y.o. female.    Chief Complaint: Pulmonary Hypertension    HPI    49-year-old female with prior coronary artery bypass in 2008 as well as aneurism repair emergently while she was living in [place].  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  Since that time she has been followed by Cardiology in his had heart catheterization every year to as well as frequent CTA of the chest.  Most recently she met with CV surgery and had an echocardiogram done which showed grade 2 diastolic dysfunction and elevated PA pressure at 53.  She was referred to me for that reason.  No history of sleep disordered breathing.  No other diseases which are associated with primary pulmonary hypertension.  She has not had a right heart catheterization that I can find in the records.  Symptom why she is unchanged over a number of years and has just minimal exercise intolerance.    Past Medical History:   Diagnosis Date    Anemia 02/01/1990    Anxiety 08/01/2010    Depression 2008    Encounter for blood transfusion 01/15/2008    Endometriosis of uterus 05/01/1999    Fibroid 2010    Gestational diabetes     Heart disease     Heart murmur 01/14/2008    Hypertension 2009    Marfan syndrome     Varicose veins - Both legs      Past Surgical History:   Procedure Laterality Date    ABDOMINAL SURGERY  06/04/2006    several-had abscesses and complications after wt loss surgery; repair of small bowl injury; subsequent colon resection 6/14/19    APPENDECTOMY  2009    Rupture    BREAST BIOPSY Left 2019    CERVIX LESION DESTRUCTION      cryo for cervical dysplasia    CHOLECYSTECTOMY      COLON SURGERY  2006    COLONOSCOPY  2018    Normal    COLONOSCOPY N/A 12/13/2022    Procedure: COLONOSCOPY 10/29-card clearance received;  Surgeon: Ruben Chavez MD;  Location: [facility];  Service: Endoscopy;   Laterality: N/A;    COLONOSCOPY N/A 2023    Procedure: COLONOSCOPY;  Surgeon: Angy Ellis MD;  Location: [facility] ENDO;  Service: Endoscopy;  Laterality: N/A;    CORONARY ARTERY BYPASS GRAFT (CABG)      DILATION AND CURETTAGE OF UTERUS  2008    Miscarriage    ESOPHAGOGASTRODUODENOSCOPY N/A 2023    Procedure: EGD (ESOPHAGOGASTRODUODENOSCOPY);  Surgeon: Angy Ellis MD;  Location: [facility] ENDO;  Service: Endoscopy;  Laterality: N/A;    INJECTION OF ANESTHETIC AGENT INTO TISSUE PLANE DEFINED BY TRANSVERSUS ABDOMINIS MUSCLE Left 10/9/2023    Procedure: Left TRANSVERSUS ABDOMINIS PLANE Block under Ultrasound;  Surgeon: Jose De Luna MD;  Location: [facility] PAIN MGT;  Service: Pain Management;  Laterality: Left;    INJECTION OF ANESTHETIC AGENT INTO TISSUE PLANE DEFINED BY TRANSVERSUS ABDOMINIS MUSCLE Left 3/14/2024    Procedure: Ultrasound-Guided Left TRANSVERSUS ABDOMINIS PLANE Block;  Surgeon: Jose De Luna MD;  Location: [facility] PAIN MGT;  Service: Pain Management;  Laterality: Left;    INSERTION OF VENOUS ACCESS PORT Left     Power Port 2012- Left Chestwall [place]    LUNG REMOVAL, PARTIAL Right     2/3 of right lung removed; thought to be lung cancer, final path equals histoplasmosis    CHANDA-EN-Y PROCEDURE       Social History     Tobacco Use    Smoking status: Never    Smokeless tobacco: Never    Tobacco comments:     Mother was a amoker.  I have used chewing tobacco   Substance Use Topics    Alcohol use: Not Currently     Comment: occasion    Drug use: Never     Family History   Problem Relation Name Age of Onset    Hypertension Mother  from Lung Cancer     Rheum arthritis Mother  from Lung Cancer     Cancer Mother  from Lung Cancer     Diabetes Mother  from Lung Cancer         Type 2 w/insulin    Lung cancer Mother  from Lung Cancer         Lung cancer complications at 63    Heart attack Maternal Grandmother Rick Bird     Diabetes Maternal  "Grandmother Rick Bird         Brother Type 2    Hyperlipidemia Maternal Grandfather Harvinder Bird     Heart failure Maternal Grandfather Harvinder Bird     Heart failure Paternal Grandmother Myrna Bird          from Heart Attack    Rheum arthritis Maternal Uncle Jose D Bird         Blood sepsis    Cancer Maternal Aunt Janae Jasmine         Ovarian cancer    Cancer Maternal Aunt Teresa Block         Brain cancer    Migraines Brother Mamadou Rodriguez     Diabetes Brother Rick Bird         Oral meds       Review of Systems as per hPI otherwise negative    Objective:     Physical Exam   Constitutional: She is oriented to person, place, and time. She appears well-developed. No distress.   HENT:   Head: Normocephalic.   Cardiovascular: Normal rate and regular rhythm.   Pulmonary/Chest: Symmetric chest wall expansion, effort normal and breath sounds normal.   Neurological: She is alert and oriented to person, place, and time.   Psychiatric: She has a normal mood and affect.   Nursing note and vitals reviewed.          2024     1:52 PM 2024    12:35 PM 2024     3:43 PM 2024    12:04 PM 4/15/2024    11:58 AM 4/10/2024     9:33 AM 3/28/2024     3:57 PM   Pulmonary Function Tests   SpO2 97 % 97 %     99 %   Height 6' 4" (1.93 m) 6' 4" (1.93 m) 6' 4" (1.93 m) 6' 4" (1.93 m) 6' 4" (1.93 m) 6' 4" (1.93 m)    Weight 100.4 kg (221 lb 5.5 oz) 99.2 kg (218 lb 11.1 oz) 98.4 kg (217 lb) 98.5 kg (217 lb 2.5 oz) 98 kg (216 lb 0.8 oz) 98.2 kg (216 lb 7.9 oz) 97.7 kg (215 lb 6.2 oz)   BMI (Calculated) 27 26.6 26.4 26.4 26.3 26.4         Assessment:     1. Pulmonary hypertension        Plan:     I reviewed echo findings with the patient  She has no right ventricular or right atrial dysfunction on echo  Elevated PA pressure most likely due to left ventricular abnormality seen on echo specifically wall motion abnormalities and diastolic dysfunction  No evidence for primary pulmonary hypertension  Should " this be a concern then would defer to Cardiology for right heart catheterization with vasodilator study  Reassured patient  Otherwise I will see back fiordaliza

## 2024-05-28 ENCOUNTER — PATIENT MESSAGE (OUTPATIENT)
Dept: ADMINISTRATIVE | Facility: OTHER | Age: 49
End: 2024-05-28
Payer: MEDICARE

## 2024-05-28 ENCOUNTER — PATIENT MESSAGE (OUTPATIENT)
Dept: PRIMARY CARE CLINIC | Facility: CLINIC | Age: 49
End: 2024-05-28
Payer: MEDICARE

## 2024-06-03 ENCOUNTER — PATIENT MESSAGE (OUTPATIENT)
Dept: ADMINISTRATIVE | Facility: OTHER | Age: 49
End: 2024-06-03
Payer: MEDICARE

## 2024-06-03 DIAGNOSIS — R10.9 CHRONIC ABDOMINAL PAIN: ICD-10-CM

## 2024-06-03 DIAGNOSIS — G89.29 CHRONIC ABDOMINAL PAIN: ICD-10-CM

## 2024-06-04 RX ORDER — HYDROCODONE BITARTRATE AND ACETAMINOPHEN 5; 325 MG/1; MG/1
1 TABLET ORAL EVERY 8 HOURS PRN
Qty: 21 TABLET | Refills: 0 | Status: SHIPPED | OUTPATIENT
Start: 2024-06-04 | End: 2024-06-12 | Stop reason: SDUPTHER

## 2024-06-12 ENCOUNTER — OFFICE VISIT (OUTPATIENT)
Dept: PAIN MEDICINE | Facility: CLINIC | Age: 49
End: 2024-06-12
Payer: MEDICARE

## 2024-06-12 VITALS
WEIGHT: 224.88 LBS | RESPIRATION RATE: 17 BRPM | BODY MASS INDEX: 30.46 KG/M2 | DIASTOLIC BLOOD PRESSURE: 58 MMHG | HEIGHT: 72 IN | SYSTOLIC BLOOD PRESSURE: 117 MMHG

## 2024-06-12 DIAGNOSIS — R10.9 CHRONIC ABDOMINAL PAIN: Primary | ICD-10-CM

## 2024-06-12 DIAGNOSIS — G89.29 CHRONIC ABDOMINAL PAIN: Primary | ICD-10-CM

## 2024-06-12 DIAGNOSIS — G89.18 PAIN FOLLOWING SURGERY OR PROCEDURE: ICD-10-CM

## 2024-06-12 DIAGNOSIS — G89.4 CHRONIC PAIN DISORDER: ICD-10-CM

## 2024-06-12 PROCEDURE — 99999 PR PBB SHADOW E&M-EST. PATIENT-LVL II: CPT | Mod: PBBFAC,,, | Performed by: ANESTHESIOLOGY

## 2024-06-12 PROCEDURE — 99212 OFFICE O/P EST SF 10 MIN: CPT | Mod: PBBFAC | Performed by: ANESTHESIOLOGY

## 2024-06-12 PROCEDURE — 99215 OFFICE O/P EST HI 40 MIN: CPT | Mod: S$PBB,,, | Performed by: ANESTHESIOLOGY

## 2024-06-12 RX ORDER — HYDROCODONE BITARTRATE AND ACETAMINOPHEN 5; 325 MG/1; MG/1
1 TABLET ORAL EVERY 8 HOURS PRN
Qty: 21 TABLET | Refills: 0 | Status: SHIPPED | OUTPATIENT
Start: 2024-06-12

## 2024-06-12 RX ORDER — TIZANIDINE 4 MG/1
8 TABLET ORAL 3 TIMES DAILY
Qty: 270 TABLET | Refills: 3 | Status: SHIPPED | OUTPATIENT
Start: 2024-06-12

## 2024-06-12 NOTE — PROGRESS NOTES
Interventional Pain Progress Note       Referring Physician: No ref. provider found    PCP: Scott Escobar MD    Chief Complaint:   Abdominal Pain       SUBJECTIVE:    Interval History (06/12/2024):  Patient returns to clinic for evaluation of lower back pain.  Since last being seen, patient reports continued left abdominal pain.  Patient does report since last being seen she is issues having adequate bowel movements has a supplement with a laxative approximately once a week.  Pain is worse with p.o. intake and Valsalva, better with rest.  Pain is currently rated a 3 out 10, but can increase to a 7/10 with exacerbating activity. Denies any fevers, chills, changes in gait, saddle anesthesia, or bowel and bladder incontinence        Interval History (04/15/2024):  Patient returns to clinic after procedure.  Patient reports 65% relief after left ultrasound-guided transverse abdominis plane block on 03/14/2024.  Patient reports that she has not having as many episodes of left-sided abdominal pain, but reports that the pain intensity has remained constant throughout this time.  Pain described as stabbing aching pain in the left lower quadrant of the abdomen.  Patient denies any significant radiation of the pain.  Pain is typically worse at night, better during the day.  Pain is currently rated a 4/10. Denies any fevers, chills, changes in gait, saddle anesthesia, or bowel and bladder incontinence    Interval History (12/05/2023):  Patient returns to clinic after procedure.  Patient reports 75% relief in abdominal pain after left transversus abdominis plane block on 10/19/2023.  Patient reports that sharp pain over left abdomen has significantly improved.  Patient reports that she is now able to eat more foods after this injection.  Patient reports she still can not eat rice without significant abdominal pain.  Pain is currently rated a 2/10.      Initial HPI:     Viri Carlson is a 49 y.o. female who  presents to the clinic for the evaluation of abdominal pain.  Patient reports over 10 year history of abdominal pain.  Patient has undergone multiple abdominal surgeries after complications from gastric sleeve surgery including appendectomy, cholecystectomy, and multiple lysis of adhesions.  Patient has also undergone left lobe lobectomy.  Most recent lysis of adhesions was then 2019.  Pain is described as a aching throbbing pain primarily in the left upper quadrant of the abdomen.  Patient reports that as pain worsens this pain will radiate to the left lower quadrant and occasionally radiates to her right abdomen as well.  Patient reports pain is worse with certain foods including rice and with medications that lead to bowel changes, pain is typically better  with hydration.  Pain is currently rated a 5/10, but increase to a 10/10 with exacerbating activity. Denies any fevers, chills, changes in gait, saddle anesthesia, or bowel and bladder incontinence    Non-Pharmacologic Treatments:  Physical Therapy/Home Exercise: yes  Ice/Heat:yes  TENS: no  Acupuncture: no  Massage: yes  Chiropractic: no        Previous Pain Medications:  NSAIDs, Tylenol, muscle relaxers, neuropathics, opioids, topicals       report:  Reviewed and consistent with medication use as prescribed.    Pain Procedures:   -03/14/2024:  Left transversus abdominis plane block, 65% relief  -10/19/2023:  Left transversus abdominis plane block, 75% relief    Pain Disability Index Review:         4/15/2024    11:57 AM 12/5/2023     2:00 PM 10/3/2023     1:09 PM   Last 3 PDI Scores   Pain Disability Index (PDI) 25 42 43       Imaging:   CTA CHEST ABDOMEN PELVIS  Order: [id number]  Impression    Preliminary impression:   1.  Negative for intramural hematoma or aortic dissection/aneurysm.   2.  Status post CABG, cholecystectomy, gastric bypass surgery, and   bowel anastomoses which are without change.   3.  Left common iliac and external vein stent is  unchanged.   Colonic diverticulosis without diverticulitis.     ------THE FOLLOWING REPRESENTS THE FINAL ATTENDING REPORT------       Provided history:   45 years old Female with provided history of Thoracic aortic aneurysm   suspected, initial exam, Patient with a history of Marfan syndrome,   chest pain radiating towards back, negative troponins, pain not   relieved with medication.     Comparison:   CT chest 4/20/2020, CT abdomen pelvis 9/22/2019.     Technique:   Routine nonenhanced CT through the chest was performed. Subsequently,   enhanced CT through the chest, abdomen, and pelvis was performed in   the arterial phase of contrast per protocol. Multiplanar   reconstructions were provided. 3-D reconstructions were performed on   an independent workstation.     Dose reduction techniques were utilized for this exam including   automated exposure control, adjustments to mA and/or kV according to   patient's size, and the use of iterative reconstruction techniques.     Findings:     There is no thoracic aortic intramural hematoma, dissection, or   penetrating ulcer. The thoracic aorta and pulmonary artery are normal   in caliber. There is no aortic calcific atherosclerosis. Prior   sternotomy and coronary artery bypass grafting The proximal   supraaortic vessels are patent. There are no central pulmonary   arterial filling defects.     The abdominal aorta is normal in course and caliber without dissection   or penetrating ulcer. There is no calcific atherosclerosis of the   abdominal aorta and its proximal branches. A left iliac/common iliac   stent is present. Luminal evaluation of this stent is limited given   the arterial phase of contrast. The celiac, SMA, single right and   duplicated left renal, and HAZEL are patent.     The heart is normal in size. The pericardium is unremarkable. There is   no mediastinal or hilar lymphadenopathy.     Bilateral lungs are clear of focal consolidation. There are no pleural    effusions or pneumothorax. There is minimal right basilar linear   parenchymal scarring versus atelectasis.     Previous gastric bypass and multiple small/large bowel anastomoses   with unremarkable-appearing suture beds and no associated obstruction.   Scattered colonic diverticulosis without diverticulitis.     Prior cholecystectomy with mild expected prominence of the biliary   system.     Liver, spleen, pancreas, adrenal glands, and kidneys are unremarkable.     Urinary bladder is unremarkable. Stable mildly enlarged multifibroid   uterus.     No free air or free fluid.     No acute osseous abnormalities.     Impression:   1.  Negative for acute aortic pathology.   2.  No acute findings of the chest, abdomen, or pelvis.      Past Medical History:   Diagnosis Date    Anemia 02/01/1990    Anxiety 08/01/2010    Depression 2008    Encounter for blood transfusion 01/15/2008    Endometriosis of uterus 05/01/1999    Fibroid 2010    Gestational diabetes     Heart disease     Heart murmur 01/14/2008    Hypertension 2009    Marfan syndrome     Varicose veins - Both legs      Past Surgical History:   Procedure Laterality Date    ABDOMINAL SURGERY  06/04/2006    several-had abscesses and complications after wt loss surgery; repair of small bowl injury; subsequent colon resection 6/14/19    APPENDECTOMY  2009    Rupture    BREAST BIOPSY Left 2019    CERVIX LESION DESTRUCTION      cryo for cervical dysplasia    CHOLECYSTECTOMY      COLON SURGERY  2006    COLONOSCOPY  2018    Normal    COLONOSCOPY N/A 12/13/2022    Procedure: COLONOSCOPY 10/29-card clearance received;  Surgeon: Ruben Chavez MD;  Location: [facility] ENDO;  Service: Endoscopy;  Laterality: N/A;    COLONOSCOPY N/A 07/11/2023    Procedure: COLONOSCOPY;  Surgeon: Angy Ellis MD;  Location: [facility];  Service: Endoscopy;  Laterality: N/A;    CORONARY ARTERY BYPASS GRAFT (CABG)  2008    DILATION AND CURETTAGE OF UTERUS  2008    Miscarriage     ESOPHAGOGASTRODUODENOSCOPY N/A 07/11/2023    Procedure: EGD (ESOPHAGOGASTRODUODENOSCOPY);  Surgeon: Angy Ellis MD;  Location: [facility];  Service: Endoscopy;  Laterality: N/A;    INJECTION OF ANESTHETIC AGENT INTO TISSUE PLANE DEFINED BY TRANSVERSUS ABDOMINIS MUSCLE Left 10/9/2023    Procedure: Left TRANSVERSUS ABDOMINIS PLANE Block under Ultrasound;  Surgeon: Jose De Luna MD;  Location: [facility] PAIN MGT;  Service: Pain Management;  Laterality: Left;    INJECTION OF ANESTHETIC AGENT INTO TISSUE PLANE DEFINED BY TRANSVERSUS ABDOMINIS MUSCLE Left 3/14/2024    Procedure: Ultrasound-Guided Left TRANSVERSUS ABDOMINIS PLANE Block;  Surgeon: Jose De Luna MD;  Location: [facility] PAIN [facility]GT;  Service: Pain Management;  Laterality: Left;    INSERTION OF VENOUS ACCESS PORT Left     Power Port 2012- Left Chestwall [place] in [place]    LUNG REMOVAL, PARTIAL Right     2/3 of right lung removed; thought to be lung cancer, final path equals histoplasmosis    CHANDA-EN-Y PROCEDURE       Social History     Socioeconomic History    Marital status:    Tobacco Use    Smoking status: Never    Smokeless tobacco: Never    Tobacco comments:     Mother was a amoker.  I have used chewing tobacco   Substance and Sexual Activity    Alcohol use: Not Currently     Comment: occasion    Drug use: Never    Sexual activity: Not Currently     Partners: Male     Birth control/protection: Abstinence, None     Comment:       Social Determinants of Health     Financial Resource Strain: Low Risk  (3/28/2024)    Overall Financial Resource Strain (CARDIA)     Difficulty of Paying Living Expenses: Not very hard   Food Insecurity: No Food Insecurity (3/28/2024)    Hunger Vital Sign     Worried About Running Out of Food in the Last Year: Never true     Ran Out of Food in the Last Year: Never true   Transportation Needs: No Transportation Needs (3/28/2024)    PRAPARE - Transportation     Lack of Transportation (Medical): No      Lack of Transportation (Non-Medical): No   Physical Activity: Insufficiently Active (3/28/2024)    Exercise Vital Sign     Days of Exercise per Week: 3 days     Minutes of Exercise per Session: 30 min   Stress: Stress Concern Present (3/28/2024)    [employer] [place] [place] [employer] - Occupational Stress Questionnaire     Feeling of Stress : To some extent   Housing Stability: High Risk (3/28/2024)    Housing Stability Vital Sign     Unable to Pay for Housing in the Last Year: Yes     Number of Places Lived in the Last Year: 1     Unstable Housing in the Last Year: No     Family History   Problem Relation Name Age of Onset    Hypertension Mother  from Lung Cancer     Rheum arthritis Mother  from Lung Cancer     Cancer Mother  from Lung Cancer     Diabetes Mother  from Lung Cancer         Type 2 w/insulin    Lung cancer Mother  from Lung Cancer         Lung cancer complications at 63    Heart attack Maternal Grandmother Rick Bird     Diabetes Maternal Grandmother Rick Bird         Brother Type 2    Hyperlipidemia Maternal Grandfather Harvinder Bird     Heart failure Maternal Grandfather Harvinder Bird     Heart failure Paternal Grandmother Myrna Bird          from Heart Attack    Rheum arthritis Maternal Uncle Jose D Bird         Blood sepsis    Cancer Maternal Aunt Janae Jasmine         Ovarian cancer    Cancer Maternal Aunt Teresa Block         Brain cancer    Migraines Brother Mamadou Rodriguez     Diabetes Brother Rick Bird         Oral meds       Review of patient's allergies indicates:   Allergen Reactions    Aspirin Anaphylaxis and Swelling     Tongue swelling      Cephalexin Anaphylaxis, Nausea And Vomiting, Hives and Other (See Comments)    Iodine and iodide containing products Other (See Comments)     Pt is not allergic to contrast dye IV     Levofloxacin Anaphylaxis    Sulfa (sulfonamide antibiotics) Anaphylaxis, Blisters, Dermatitis, Itching, Shortness Of  Breath and Other (See Comments)    Sulfamethoxazole-trimethoprim Anaphylaxis     3rd degree skin burning when given IV      Sulfasalazine Anaphylaxis    Tramadol Nausea And Vomiting    Adhesive Dermatitis, Hives, Itching and Other (See Comments)     bandaids and adhesive on electrodes  bandaids and adhesive on electrodes, whelps      Adhesive tape-silicones Itching    Barium iodide Nausea And Vomiting     Oral only causes vomiting       Current Outpatient Medications   Medication Sig    bumetanide (BUMEX) 1 MG tablet Take 1 tablet (1 mg total) by mouth once daily.    calcium carbonate (OS-ESE) 600 mg calcium (1,500 mg) Tab Take 600 mg by mouth.    cyanocobalamin 1,000 mcg/mL injection Inject 1 mL (1,000 mcg total) into the muscle every 30 days.    doxepin (SINEQUAN) 150 MG Cap Take 1 capsule (150 mg total) by mouth every evening.    ergocalciferol, vitamin D2, (VITAMIN D ORAL)     ferrous sulfate (FEOSOL) 325 mg (65 mg iron) Tab tablet Take 325 mg by mouth.    gabapentin (NEURONTIN) 600 MG tablet Take 1 tablet (600 mg total) by mouth 3 (three) times daily.    iron,carb/vit C/vit B12/folic (IRON 100 PLUS ORAL)     potassium chloride (KLOR-CON) 20 mEq Pack Take 40 mEq by mouth once daily.    promethazine (PHENERGAN) 12.5 MG Supp Place 1 suppository (12.5 mg total) rectally every 6 (six) hours as needed.    promethazine (PHENERGAN) 25 MG tablet Take 1 tablet (25 mg total) by mouth every 6 (six) hours as needed for Nausea.    spironolactone (ALDACTONE) 25 MG tablet Take 1 tablet (25 mg total) by mouth once daily.    venlafaxine (EFFEXOR-XR) 150 MG Cp24 Take 1 capsule (150 mg total) by mouth once daily.    famotidine (PEPCID) 40 MG tablet Take 1 tablet (40 mg total) by mouth once. for 1 dose    HYDROcodone-acetaminophen (NORCO) 5-325 mg per tablet Take 1 tablet by mouth every 8 (eight) hours as needed for Pain.    pantoprazole (PROTONIX) 40 MG tablet Take 1 tablet (40 mg total) by mouth once daily.    tiZANidine  "(ZANAFLEX) 4 MG tablet Take 2 tablets (8 mg total) by mouth 3 (three) times daily.     No current facility-administered medications for this visit.         ROS  Review of Systems   Constitutional:  Negative for chills, diaphoresis, fatigue and fever.   Respiratory:  Negative for chest tightness, shortness of breath, wheezing and stridor.    Cardiovascular:  Negative for chest pain and leg swelling.   Gastrointestinal:  Positive for abdominal distention, abdominal pain and nausea. Negative for blood in stool, diarrhea and vomiting.   Endocrine: Negative for cold intolerance and heat intolerance.   Genitourinary:  Negative for dysuria, hematuria and urgency.   Musculoskeletal:  Positive for arthralgias and myalgias. Negative for back pain, gait problem, joint swelling, neck pain and neck stiffness.   Skin:  Negative for rash.   Neurological:  Negative for tremors, seizures, speech difficulty, weakness, light-headedness, numbness and headaches.   Hematological:  Does not bruise/bleed easily.   Psychiatric/Behavioral:  Negative for agitation, confusion and suicidal ideas.             OBJECTIVE:  BP (!) 117/58   Pulse (P) 60   Resp 17   Ht 6' 4" (1.93 m)   Wt 102 kg (224 lb 13.9 oz)   BMI 27.37 kg/m²         Physical Exam  Constitutional:       General: She is not in acute distress.     Appearance: Normal appearance. She is not ill-appearing.   HENT:      Head: Normocephalic and atraumatic.      Nose: No congestion or rhinorrhea.   Eyes:      Extraocular Movements: Extraocular movements intact.      Pupils: Pupils are equal, round, and reactive to light.   Pulmonary:      Effort: Pulmonary effort is normal.   Abdominal:      General: There is no distension.      Palpations: There is no mass.      Tenderness: There is abdominal tenderness. There is no guarding or rebound.      Comments: Midline laparotomy in incision is clean dry and intact.  Tenderness to palpation over left lower quadrant   Skin:     General: Skin " is warm and dry.      Capillary Refill: Capillary refill takes less than 2 seconds.   Neurological:      General: No focal deficit present.      Mental Status: She is alert and oriented to person, place, and time.      Sensory: No sensory deficit.   Psychiatric:         Mood and Affect: Mood normal.         Behavior: Behavior normal.         Thought Content: Thought content normal.           LABS:  Lab Results   Component Value Date    WBC 4.12 02/19/2024    HGB 12.9 02/19/2024    HCT 42.8 02/19/2024    MCV 84 02/19/2024     02/19/2024       CMP  Sodium   Date Value Ref Range Status   02/19/2024 136 136 - 145 mmol/L Final     Potassium   Date Value Ref Range Status   02/19/2024 4.4 3.5 - 5.1 mmol/L Final     Chloride   Date Value Ref Range Status   02/19/2024 103 95 - 110 mmol/L Final     CO2   Date Value Ref Range Status   02/19/2024 27 23 - 29 mmol/L Final     Glucose   Date Value Ref Range Status   02/19/2024 92 70 - 110 mg/dL Final     BUN   Date Value Ref Range Status   02/19/2024 10 6 - 20 mg/dL Final     Creatinine   Date Value Ref Range Status   02/19/2024 0.7 0.5 - 1.4 mg/dL Final     Calcium   Date Value Ref Range Status   02/19/2024 9.6 8.7 - 10.5 mg/dL Final     Total Protein   Date Value Ref Range Status   02/19/2024 7.1 6.0 - 8.4 g/dL Final     Albumin   Date Value Ref Range Status   02/19/2024 3.7 3.5 - 5.2 g/dL Final     Total Bilirubin   Date Value Ref Range Status   02/19/2024 0.4 0.1 - 1.0 mg/dL Final     Comment:     For infants and newborns, interpretation of results should be based  on gestational age, weight and in agreement with clinical  observations.    Premature Infant recommended reference ranges:  Up to 24 hours.............<8.0 mg/dL  Up to 48 hours............<12.0 mg/dL  3-5 days..................<15.0 mg/dL  6-29 days.................<15.0 mg/dL       Alkaline Phosphatase   Date Value Ref Range Status   02/19/2024 117 55 - 135 U/L Final     AST   Date Value Ref Range Status    02/19/2024 19 10 - 40 U/L Final     ALT   Date Value Ref Range Status   02/19/2024 15 10 - 44 U/L Final     Anion Gap   Date Value Ref Range Status   02/19/2024 6 (L) 8 - 16 mmol/L Final     eGFR if    Date Value Ref Range Status   03/18/2022 >60.0 >60 mL/min/1.73 m^2 Final     eGFR if non    Date Value Ref Range Status   03/18/2022 >60.0 >60 mL/min/1.73 m^2 Final     Comment:     Calculation used to obtain the estimated glomerular filtration  rate (eGFR) is the CKD-EPI equation.          Lab Results   Component Value Date    HGBA1C 6.0 (H) 02/19/2024             ASSESSMENT:       49 y.o. year old female with abdominal pain, consistent with     1. Chronic abdominal pain  tiZANidine (ZANAFLEX) 4 MG tablet    HYDROcodone-acetaminophen (NORCO) 5-325 mg per tablet      2. Pain following surgery or procedure        3. Chronic pain disorder          Chronic abdominal pain  -     tiZANidine (ZANAFLEX) 4 MG tablet; Take 2 tablets (8 mg total) by mouth 3 (three) times daily.  Dispense: 270 tablet; Refill: 3  -     HYDROcodone-acetaminophen (NORCO) 5-325 mg per tablet; Take 1 tablet by mouth every 8 (eight) hours as needed for Pain.  Dispense: 21 tablet; Refill: 0    Pain following surgery or procedure    Chronic pain disorder             PLAN:   - Interventions:    None at this time.  Can consider repeat transverse abdominis block versus dorsal root ganglion stimulation  -03/14/2024:  Left transversus abdominis plane block, 65% relief  -10/19/2023:  Left transversus abdominis plane block, 75% relief  Can consider quadratus lumborum block versus peripheral nerve stimulation versus dorsal root ganglion stimulation if pain continues    - Anticoagulation use:   No no anticoagulation    - Medications:   Continue gabapentin 600 mg 3 times a day   Refill tizanidine 4 mg 3 times a day p.r.n.   Refill Norco 5mg PO q8h PRN, #21, no refills, 06/12/2024  . Patient understands that this medication is for  breakthrough pain only, and should not be taken regularly.      - Therapy:    Continue abdominal therapy exercises from general surgery    - Imaging/Diagnostic:   CT of abdomen and pelvis    - Consults:    Patient reports problems having 4 bowel movements and having to take laxative approximately once per week.  We will let Gastroenterology know about this issue.  Reviewed continue follow up with Gastroenterology and General surgery for abdominal pain in patient with history of multiple previous abdominal surgeries        - Patient Questions: Answered all of the patient's questions regarding diagnosis, therapy, and treatment     This condition does not require this patient to take time off of work, and the primary goal of our Pain Management services is to improve the patient's functional capacity.     - Follow up visit: return to clinic p.r.n.      The above plan and management options were discussed at length with patient. Patient is in agreement with the above and verbalized understanding.    I discussed the goals of interventional chronic pain management with the patient on today's visit.  I explained the utility of injections for diagnostic and therapeutic purposes.  We discussed a multimodal approach to pain including treating the patient's given worst pain at any given time.  We will use a systematic approach to addressing pain.  We will also adopt a multimodal approach that includes injections, adjuvant medications, physical therapy, at times psychiatry.  There may be a limited role for opioid use intermittently in the treatment of pain, more particularly for acute pain although no one approach can be used as a sole treatment modality.    I emphasized the importance of regular exercise, core strengthening and stretching, diet and weight loss as a cornerstone of long-term pain management.      Jose De Luna MD  Interventional Pain Management  [facility]    Disclaimer:  This note was prepared  using voice recognition system and is likely to have sound alike errors that may have been overlooked even after proof reading.  Please call me with any questions    I spent a total of 40 minutes on the day of the visit.  This includes face to face time and non-face to face time preparing to see the patient (eg, review of tests), obtaining and/or reviewing separately obtained history, documenting clinical information in the electronic or other health record, independently interpreting results and communicating results to the patient/family/caregiver, or care coordinator.